# Patient Record
Sex: FEMALE | Race: WHITE
[De-identification: names, ages, dates, MRNs, and addresses within clinical notes are randomized per-mention and may not be internally consistent; named-entity substitution may affect disease eponyms.]

---

## 2019-06-18 ENCOUNTER — HOSPITAL ENCOUNTER (OUTPATIENT)
Dept: HOSPITAL 53 - M INFU | Age: 74
Discharge: HOME | End: 2019-06-18
Attending: PHYSICIAN ASSISTANT
Payer: MEDICARE

## 2019-06-18 VITALS — DIASTOLIC BLOOD PRESSURE: 59 MMHG | SYSTOLIC BLOOD PRESSURE: 123 MMHG

## 2019-06-18 VITALS — HEIGHT: 58 IN | WEIGHT: 105.82 LBS | BODY MASS INDEX: 22.21 KG/M2

## 2019-06-18 VITALS — SYSTOLIC BLOOD PRESSURE: 124 MMHG | DIASTOLIC BLOOD PRESSURE: 59 MMHG

## 2019-06-18 DIAGNOSIS — G35: Primary | ICD-10-CM

## 2019-06-18 PROCEDURE — 96372 THER/PROPH/DIAG INJ SC/IM: CPT

## 2019-06-25 ENCOUNTER — HOSPITAL ENCOUNTER (OUTPATIENT)
Dept: HOSPITAL 53 - M INFU | Age: 74
Discharge: HOME | End: 2019-06-25
Attending: PHYSICIAN ASSISTANT
Payer: MEDICARE

## 2019-06-25 VITALS — HEIGHT: 58 IN | WEIGHT: 105.82 LBS | BODY MASS INDEX: 22.21 KG/M2

## 2019-06-25 VITALS — SYSTOLIC BLOOD PRESSURE: 123 MMHG | DIASTOLIC BLOOD PRESSURE: 58 MMHG

## 2019-06-25 VITALS — DIASTOLIC BLOOD PRESSURE: 74 MMHG | SYSTOLIC BLOOD PRESSURE: 138 MMHG

## 2019-06-25 DIAGNOSIS — G35: Primary | ICD-10-CM

## 2019-06-25 PROCEDURE — 96372 THER/PROPH/DIAG INJ SC/IM: CPT

## 2019-07-02 ENCOUNTER — HOSPITAL ENCOUNTER (OUTPATIENT)
Dept: HOSPITAL 53 - M INFU | Age: 74
Discharge: HOME | End: 2019-07-02
Attending: PHYSICIAN ASSISTANT
Payer: MEDICARE

## 2019-07-02 VITALS — HEIGHT: 58 IN | BODY MASS INDEX: 22.21 KG/M2 | WEIGHT: 105.82 LBS

## 2019-07-02 VITALS — SYSTOLIC BLOOD PRESSURE: 133 MMHG | DIASTOLIC BLOOD PRESSURE: 60 MMHG

## 2019-07-02 VITALS — DIASTOLIC BLOOD PRESSURE: 59 MMHG | SYSTOLIC BLOOD PRESSURE: 132 MMHG

## 2019-07-02 DIAGNOSIS — G35: Primary | ICD-10-CM

## 2019-07-02 PROCEDURE — 96372 THER/PROPH/DIAG INJ SC/IM: CPT

## 2019-07-09 ENCOUNTER — HOSPITAL ENCOUNTER (OUTPATIENT)
Dept: HOSPITAL 53 - M INFU | Age: 74
Discharge: HOME | End: 2019-07-09
Attending: PHYSICIAN ASSISTANT
Payer: MEDICARE

## 2019-07-09 VITALS — BODY MASS INDEX: 16.04 KG/M2 | HEIGHT: 68 IN | WEIGHT: 105.82 LBS

## 2019-07-09 VITALS — DIASTOLIC BLOOD PRESSURE: 66 MMHG | SYSTOLIC BLOOD PRESSURE: 143 MMHG

## 2019-07-09 VITALS — DIASTOLIC BLOOD PRESSURE: 67 MMHG | SYSTOLIC BLOOD PRESSURE: 144 MMHG

## 2019-07-09 DIAGNOSIS — G35: Primary | ICD-10-CM

## 2019-07-09 PROCEDURE — 96372 THER/PROPH/DIAG INJ SC/IM: CPT

## 2019-07-16 ENCOUNTER — HOSPITAL ENCOUNTER (OUTPATIENT)
Dept: HOSPITAL 53 - M INFU | Age: 74
Discharge: HOME | End: 2019-07-16
Attending: PHYSICIAN ASSISTANT
Payer: MEDICARE

## 2019-07-16 VITALS — WEIGHT: 105.82 LBS | BODY MASS INDEX: 16.04 KG/M2 | HEIGHT: 68 IN

## 2019-07-16 VITALS — SYSTOLIC BLOOD PRESSURE: 113 MMHG | DIASTOLIC BLOOD PRESSURE: 55 MMHG

## 2019-07-16 VITALS — DIASTOLIC BLOOD PRESSURE: 59 MMHG | SYSTOLIC BLOOD PRESSURE: 125 MMHG

## 2019-07-16 DIAGNOSIS — G35: Primary | ICD-10-CM

## 2019-07-16 PROCEDURE — 96372 THER/PROPH/DIAG INJ SC/IM: CPT

## 2019-07-23 ENCOUNTER — HOSPITAL ENCOUNTER (OUTPATIENT)
Dept: HOSPITAL 53 - M INFU | Age: 74
Discharge: HOME | End: 2019-07-23
Attending: PHYSICIAN ASSISTANT
Payer: MEDICARE

## 2019-07-23 VITALS — SYSTOLIC BLOOD PRESSURE: 120 MMHG | DIASTOLIC BLOOD PRESSURE: 56 MMHG

## 2019-07-23 VITALS — DIASTOLIC BLOOD PRESSURE: 55 MMHG | SYSTOLIC BLOOD PRESSURE: 115 MMHG

## 2019-07-23 VITALS — HEIGHT: 58 IN | BODY MASS INDEX: 22.21 KG/M2 | WEIGHT: 105.82 LBS

## 2019-07-23 DIAGNOSIS — G35: Primary | ICD-10-CM

## 2019-07-23 PROCEDURE — 96372 THER/PROPH/DIAG INJ SC/IM: CPT

## 2019-07-30 ENCOUNTER — HOSPITAL ENCOUNTER (OUTPATIENT)
Dept: HOSPITAL 53 - M INFU | Age: 74
Discharge: HOME | End: 2019-07-30
Attending: PHYSICIAN ASSISTANT
Payer: MEDICARE

## 2019-07-30 VITALS — SYSTOLIC BLOOD PRESSURE: 129 MMHG | DIASTOLIC BLOOD PRESSURE: 56 MMHG

## 2019-07-30 VITALS — HEIGHT: 58 IN | BODY MASS INDEX: 22.21 KG/M2 | WEIGHT: 105.82 LBS

## 2019-07-30 VITALS — DIASTOLIC BLOOD PRESSURE: 67 MMHG | SYSTOLIC BLOOD PRESSURE: 150 MMHG

## 2019-07-30 DIAGNOSIS — G35: Primary | ICD-10-CM

## 2019-07-30 PROCEDURE — 96401 CHEMO ANTI-NEOPL SQ/IM: CPT

## 2019-08-06 ENCOUNTER — HOSPITAL ENCOUNTER (OUTPATIENT)
Dept: HOSPITAL 53 - M INFU | Age: 74
End: 2019-08-06
Attending: PHYSICIAN ASSISTANT
Payer: MEDICARE

## 2019-08-06 VITALS — SYSTOLIC BLOOD PRESSURE: 147 MMHG | DIASTOLIC BLOOD PRESSURE: 66 MMHG

## 2019-08-06 VITALS — BODY MASS INDEX: 22.21 KG/M2 | WEIGHT: 105.82 LBS | HEIGHT: 58 IN

## 2019-08-06 VITALS — SYSTOLIC BLOOD PRESSURE: 139 MMHG | DIASTOLIC BLOOD PRESSURE: 64 MMHG

## 2019-08-06 DIAGNOSIS — G35: Primary | ICD-10-CM

## 2019-08-06 PROCEDURE — 96372 THER/PROPH/DIAG INJ SC/IM: CPT

## 2019-08-13 ENCOUNTER — HOSPITAL ENCOUNTER (OUTPATIENT)
Dept: HOSPITAL 53 - M INFU | Age: 74
Discharge: HOME | End: 2019-08-13
Attending: PHYSICIAN ASSISTANT
Payer: MEDICARE

## 2019-08-13 VITALS — DIASTOLIC BLOOD PRESSURE: 60 MMHG | SYSTOLIC BLOOD PRESSURE: 128 MMHG

## 2019-08-13 VITALS — SYSTOLIC BLOOD PRESSURE: 136 MMHG | DIASTOLIC BLOOD PRESSURE: 60 MMHG

## 2019-08-13 VITALS — HEIGHT: 58 IN | WEIGHT: 105.82 LBS | BODY MASS INDEX: 22.21 KG/M2

## 2019-08-13 DIAGNOSIS — G35: Primary | ICD-10-CM

## 2019-08-13 PROCEDURE — 96372 THER/PROPH/DIAG INJ SC/IM: CPT

## 2019-08-20 ENCOUNTER — HOSPITAL ENCOUNTER (OUTPATIENT)
Dept: HOSPITAL 53 - M INFU | Age: 74
Discharge: HOME | End: 2019-08-20
Attending: PHYSICIAN ASSISTANT
Payer: MEDICARE

## 2019-08-20 VITALS — SYSTOLIC BLOOD PRESSURE: 113 MMHG | DIASTOLIC BLOOD PRESSURE: 59 MMHG

## 2019-08-20 VITALS — SYSTOLIC BLOOD PRESSURE: 117 MMHG | DIASTOLIC BLOOD PRESSURE: 57 MMHG

## 2019-08-20 VITALS — HEIGHT: 58 IN | BODY MASS INDEX: 22.21 KG/M2 | WEIGHT: 105.82 LBS

## 2019-08-20 DIAGNOSIS — G35: Primary | ICD-10-CM

## 2019-08-20 PROCEDURE — 96372 THER/PROPH/DIAG INJ SC/IM: CPT

## 2019-08-20 RX ADMIN — INTERFERON BETA-1A ONE MCG: 30 INJECTION, POWDER, LYOPHILIZED, FOR SOLUTION INTRAMUSCULAR at 14:31

## 2019-08-27 ENCOUNTER — HOSPITAL ENCOUNTER (OUTPATIENT)
Dept: HOSPITAL 53 - M INFU | Age: 74
Discharge: HOME | End: 2019-08-27
Attending: PHYSICIAN ASSISTANT
Payer: MEDICARE

## 2019-08-27 VITALS — SYSTOLIC BLOOD PRESSURE: 94 MMHG | DIASTOLIC BLOOD PRESSURE: 49 MMHG

## 2019-08-27 VITALS — WEIGHT: 105.82 LBS | BODY MASS INDEX: 22.21 KG/M2 | HEIGHT: 58 IN

## 2019-08-27 VITALS — SYSTOLIC BLOOD PRESSURE: 112 MMHG | DIASTOLIC BLOOD PRESSURE: 56 MMHG

## 2019-08-27 DIAGNOSIS — G35: Primary | ICD-10-CM

## 2019-08-27 PROCEDURE — 96372 THER/PROPH/DIAG INJ SC/IM: CPT

## 2019-09-03 ENCOUNTER — HOSPITAL ENCOUNTER (OUTPATIENT)
Dept: HOSPITAL 53 - M INFU | Age: 74
Discharge: HOME | End: 2019-09-03
Attending: PHYSICIAN ASSISTANT
Payer: MEDICARE

## 2019-09-03 VITALS — SYSTOLIC BLOOD PRESSURE: 137 MMHG | DIASTOLIC BLOOD PRESSURE: 63 MMHG

## 2019-09-03 VITALS — WEIGHT: 105.82 LBS | HEIGHT: 58 IN | BODY MASS INDEX: 22.21 KG/M2

## 2019-09-03 VITALS — SYSTOLIC BLOOD PRESSURE: 150 MMHG | DIASTOLIC BLOOD PRESSURE: 68 MMHG

## 2019-09-03 DIAGNOSIS — G35: Primary | ICD-10-CM

## 2019-09-03 PROCEDURE — 96372 THER/PROPH/DIAG INJ SC/IM: CPT

## 2019-09-10 ENCOUNTER — HOSPITAL ENCOUNTER (OUTPATIENT)
Dept: HOSPITAL 53 - M INFU | Age: 74
Discharge: HOME | End: 2019-09-10
Attending: PHYSICIAN ASSISTANT
Payer: MEDICARE

## 2019-09-10 VITALS — DIASTOLIC BLOOD PRESSURE: 56 MMHG | SYSTOLIC BLOOD PRESSURE: 112 MMHG

## 2019-09-10 VITALS — WEIGHT: 105.82 LBS | BODY MASS INDEX: 23.8 KG/M2 | HEIGHT: 56 IN

## 2019-09-10 VITALS — SYSTOLIC BLOOD PRESSURE: 108 MMHG | DIASTOLIC BLOOD PRESSURE: 51 MMHG

## 2019-09-10 DIAGNOSIS — G35: Primary | ICD-10-CM

## 2019-09-10 PROCEDURE — 96372 THER/PROPH/DIAG INJ SC/IM: CPT

## 2019-09-17 ENCOUNTER — HOSPITAL ENCOUNTER (OUTPATIENT)
Dept: HOSPITAL 53 - M INFU | Age: 74
Discharge: HOME | End: 2019-09-17
Attending: PHYSICIAN ASSISTANT
Payer: MEDICARE

## 2019-09-17 VITALS — HEIGHT: 66 IN | BODY MASS INDEX: 17.01 KG/M2 | WEIGHT: 105.82 LBS

## 2019-09-17 VITALS — SYSTOLIC BLOOD PRESSURE: 117 MMHG | DIASTOLIC BLOOD PRESSURE: 68 MMHG

## 2019-09-17 VITALS — DIASTOLIC BLOOD PRESSURE: 78 MMHG | SYSTOLIC BLOOD PRESSURE: 112 MMHG

## 2019-09-17 DIAGNOSIS — G35: Primary | ICD-10-CM

## 2019-09-17 PROCEDURE — 96372 THER/PROPH/DIAG INJ SC/IM: CPT

## 2019-09-24 ENCOUNTER — HOSPITAL ENCOUNTER (OUTPATIENT)
Dept: HOSPITAL 53 - M INFU | Age: 74
Discharge: HOME | End: 2019-09-24
Attending: PHYSICIAN ASSISTANT
Payer: MEDICARE

## 2019-09-24 VITALS — BODY MASS INDEX: 23.8 KG/M2 | HEIGHT: 56 IN | WEIGHT: 105.82 LBS

## 2019-09-24 VITALS — SYSTOLIC BLOOD PRESSURE: 107 MMHG | DIASTOLIC BLOOD PRESSURE: 50 MMHG

## 2019-09-24 VITALS — SYSTOLIC BLOOD PRESSURE: 123 MMHG | DIASTOLIC BLOOD PRESSURE: 56 MMHG

## 2019-09-24 DIAGNOSIS — G35: Primary | ICD-10-CM

## 2019-09-24 PROCEDURE — 96401 CHEMO ANTI-NEOPL SQ/IM: CPT

## 2019-10-01 ENCOUNTER — HOSPITAL ENCOUNTER (OUTPATIENT)
Dept: HOSPITAL 53 - M INFU | Age: 74
Discharge: HOME | End: 2019-10-01
Attending: PHYSICIAN ASSISTANT
Payer: MEDICARE

## 2019-10-01 VITALS — HEIGHT: 59 IN | BODY MASS INDEX: 21.33 KG/M2 | WEIGHT: 105.82 LBS

## 2019-10-01 VITALS — DIASTOLIC BLOOD PRESSURE: 57 MMHG | SYSTOLIC BLOOD PRESSURE: 113 MMHG

## 2019-10-01 DIAGNOSIS — G35: Primary | ICD-10-CM

## 2019-10-01 PROCEDURE — 96372 THER/PROPH/DIAG INJ SC/IM: CPT

## 2019-10-02 ENCOUNTER — HOSPITAL ENCOUNTER (OUTPATIENT)
Dept: HOSPITAL 53 - M LAB REF | Age: 74
End: 2019-10-02
Attending: INTERNAL MEDICINE
Payer: MEDICARE

## 2019-10-02 DIAGNOSIS — N39.0: Primary | ICD-10-CM

## 2019-10-08 ENCOUNTER — HOSPITAL ENCOUNTER (OUTPATIENT)
Dept: HOSPITAL 53 - M INFU | Age: 74
Discharge: HOME | End: 2019-10-08
Attending: PHYSICIAN ASSISTANT
Payer: MEDICARE

## 2019-10-08 VITALS — DIASTOLIC BLOOD PRESSURE: 56 MMHG | SYSTOLIC BLOOD PRESSURE: 119 MMHG

## 2019-10-08 VITALS — HEIGHT: 59 IN | WEIGHT: 105.82 LBS | BODY MASS INDEX: 21.33 KG/M2

## 2019-10-08 DIAGNOSIS — G35: Primary | ICD-10-CM

## 2019-10-08 PROCEDURE — 96401 CHEMO ANTI-NEOPL SQ/IM: CPT

## 2019-10-15 ENCOUNTER — HOSPITAL ENCOUNTER (OUTPATIENT)
Dept: HOSPITAL 53 - M INFU | Age: 74
Discharge: HOME | End: 2019-10-15
Attending: PHYSICIAN ASSISTANT
Payer: MEDICARE

## 2019-10-15 VITALS — BODY MASS INDEX: 21.33 KG/M2 | WEIGHT: 105.82 LBS | HEIGHT: 59 IN

## 2019-10-15 VITALS — SYSTOLIC BLOOD PRESSURE: 119 MMHG | DIASTOLIC BLOOD PRESSURE: 56 MMHG

## 2019-10-15 DIAGNOSIS — G35: Primary | ICD-10-CM

## 2019-10-15 PROCEDURE — 96372 THER/PROPH/DIAG INJ SC/IM: CPT

## 2019-10-22 ENCOUNTER — HOSPITAL ENCOUNTER (OUTPATIENT)
Dept: HOSPITAL 53 - M INFU | Age: 74
Discharge: HOME | End: 2019-10-22
Attending: PHYSICIAN ASSISTANT
Payer: MEDICARE

## 2019-10-22 VITALS — SYSTOLIC BLOOD PRESSURE: 144 MMHG | DIASTOLIC BLOOD PRESSURE: 66 MMHG

## 2019-10-22 VITALS — SYSTOLIC BLOOD PRESSURE: 124 MMHG | DIASTOLIC BLOOD PRESSURE: 59 MMHG

## 2019-10-22 VITALS — HEIGHT: 59 IN | WEIGHT: 105.82 LBS | BODY MASS INDEX: 21.33 KG/M2

## 2019-10-22 DIAGNOSIS — G35: Primary | ICD-10-CM

## 2019-10-22 PROCEDURE — 96372 THER/PROPH/DIAG INJ SC/IM: CPT

## 2019-10-29 ENCOUNTER — HOSPITAL ENCOUNTER (OUTPATIENT)
Dept: HOSPITAL 53 - M INFU | Age: 74
Discharge: HOME | End: 2019-10-29
Attending: PHYSICIAN ASSISTANT
Payer: MEDICARE

## 2019-10-29 VITALS — WEIGHT: 105.82 LBS | HEIGHT: 59 IN | BODY MASS INDEX: 21.33 KG/M2

## 2019-10-29 VITALS — DIASTOLIC BLOOD PRESSURE: 59 MMHG | SYSTOLIC BLOOD PRESSURE: 125 MMHG

## 2019-10-29 DIAGNOSIS — G35: Primary | ICD-10-CM

## 2019-10-29 PROCEDURE — 96372 THER/PROPH/DIAG INJ SC/IM: CPT

## 2019-11-05 ENCOUNTER — HOSPITAL ENCOUNTER (OUTPATIENT)
Dept: HOSPITAL 53 - M INFU | Age: 74
Discharge: HOME | End: 2019-11-05
Attending: PHYSICIAN ASSISTANT
Payer: MEDICARE

## 2019-11-05 VITALS — SYSTOLIC BLOOD PRESSURE: 131 MMHG | DIASTOLIC BLOOD PRESSURE: 61 MMHG

## 2019-11-05 VITALS — BODY MASS INDEX: 19.98 KG/M2 | WEIGHT: 105.82 LBS | HEIGHT: 61 IN

## 2019-11-05 DIAGNOSIS — G35: Primary | ICD-10-CM

## 2019-11-05 PROCEDURE — 96401 CHEMO ANTI-NEOPL SQ/IM: CPT

## 2019-11-19 ENCOUNTER — HOSPITAL ENCOUNTER (OUTPATIENT)
Dept: HOSPITAL 53 - M INFU | Age: 74
Discharge: HOME | End: 2019-11-19
Attending: PHYSICIAN ASSISTANT
Payer: MEDICARE

## 2019-11-19 VITALS — HEIGHT: 61 IN | WEIGHT: 105.82 LBS | BODY MASS INDEX: 19.98 KG/M2

## 2019-11-19 VITALS — DIASTOLIC BLOOD PRESSURE: 65 MMHG | SYSTOLIC BLOOD PRESSURE: 135 MMHG

## 2019-11-19 DIAGNOSIS — G35: Primary | ICD-10-CM

## 2019-11-19 PROCEDURE — 96401 CHEMO ANTI-NEOPL SQ/IM: CPT

## 2019-11-26 ENCOUNTER — HOSPITAL ENCOUNTER (OUTPATIENT)
Dept: HOSPITAL 53 - M INFU | Age: 74
Discharge: HOME | End: 2019-11-26
Attending: PHYSICIAN ASSISTANT
Payer: MEDICARE

## 2019-11-26 VITALS — WEIGHT: 105.82 LBS | BODY MASS INDEX: 19.98 KG/M2 | HEIGHT: 61 IN

## 2019-11-26 VITALS — SYSTOLIC BLOOD PRESSURE: 136 MMHG | DIASTOLIC BLOOD PRESSURE: 63 MMHG

## 2019-11-26 DIAGNOSIS — G35: Primary | ICD-10-CM

## 2019-11-26 PROCEDURE — 96372 THER/PROPH/DIAG INJ SC/IM: CPT

## 2019-12-03 ENCOUNTER — HOSPITAL ENCOUNTER (OUTPATIENT)
Dept: HOSPITAL 53 - M INFU | Age: 74
Discharge: HOME | End: 2019-12-03
Attending: PHYSICIAN ASSISTANT
Payer: MEDICARE

## 2019-12-03 VITALS — BODY MASS INDEX: 21.33 KG/M2 | HEIGHT: 59 IN | WEIGHT: 105.82 LBS

## 2019-12-03 VITALS — SYSTOLIC BLOOD PRESSURE: 120 MMHG | DIASTOLIC BLOOD PRESSURE: 55 MMHG

## 2019-12-03 DIAGNOSIS — G35: Primary | ICD-10-CM

## 2019-12-03 PROCEDURE — 96372 THER/PROPH/DIAG INJ SC/IM: CPT

## 2019-12-10 ENCOUNTER — HOSPITAL ENCOUNTER (OUTPATIENT)
Dept: HOSPITAL 53 - M INFU | Age: 74
Discharge: HOME | End: 2019-12-10
Attending: PHYSICIAN ASSISTANT
Payer: MEDICARE

## 2019-12-10 VITALS — WEIGHT: 105.82 LBS | HEIGHT: 59 IN | BODY MASS INDEX: 21.33 KG/M2

## 2019-12-10 VITALS — SYSTOLIC BLOOD PRESSURE: 133 MMHG | DIASTOLIC BLOOD PRESSURE: 61 MMHG

## 2019-12-10 DIAGNOSIS — G35: Primary | ICD-10-CM

## 2019-12-10 PROCEDURE — 96372 THER/PROPH/DIAG INJ SC/IM: CPT

## 2019-12-17 ENCOUNTER — HOSPITAL ENCOUNTER (OUTPATIENT)
Dept: HOSPITAL 53 - M INFU | Age: 74
Discharge: HOME | End: 2019-12-17
Attending: PHYSICIAN ASSISTANT
Payer: MEDICARE

## 2019-12-17 VITALS — HEIGHT: 59 IN | BODY MASS INDEX: 21.33 KG/M2 | WEIGHT: 105.82 LBS

## 2019-12-17 VITALS — SYSTOLIC BLOOD PRESSURE: 143 MMHG | DIASTOLIC BLOOD PRESSURE: 66 MMHG

## 2019-12-17 DIAGNOSIS — G35: Primary | ICD-10-CM

## 2019-12-17 PROCEDURE — 96372 THER/PROPH/DIAG INJ SC/IM: CPT

## 2019-12-26 ENCOUNTER — HOSPITAL ENCOUNTER (OUTPATIENT)
Dept: HOSPITAL 53 - M INFU | Age: 74
End: 2019-12-26
Attending: PHYSICIAN ASSISTANT
Payer: MEDICARE

## 2019-12-26 VITALS — HEIGHT: 69 IN | WEIGHT: 105.82 LBS | BODY MASS INDEX: 15.67 KG/M2

## 2019-12-26 VITALS — DIASTOLIC BLOOD PRESSURE: 64 MMHG | SYSTOLIC BLOOD PRESSURE: 142 MMHG

## 2019-12-26 DIAGNOSIS — G35: Primary | ICD-10-CM

## 2019-12-26 PROCEDURE — 96372 THER/PROPH/DIAG INJ SC/IM: CPT

## 2019-12-31 ENCOUNTER — HOSPITAL ENCOUNTER (OUTPATIENT)
Dept: HOSPITAL 53 - M INFU | Age: 74
Discharge: HOME | End: 2019-12-31
Attending: PHYSICIAN ASSISTANT
Payer: MEDICARE

## 2019-12-31 VITALS — WEIGHT: 105.82 LBS | HEIGHT: 58 IN | BODY MASS INDEX: 22.21 KG/M2

## 2019-12-31 VITALS — SYSTOLIC BLOOD PRESSURE: 129 MMHG | DIASTOLIC BLOOD PRESSURE: 62 MMHG

## 2019-12-31 DIAGNOSIS — G35: Primary | ICD-10-CM

## 2019-12-31 PROCEDURE — 96372 THER/PROPH/DIAG INJ SC/IM: CPT

## 2020-01-07 ENCOUNTER — HOSPITAL ENCOUNTER (OUTPATIENT)
Dept: HOSPITAL 53 - M INFU | Age: 75
Discharge: HOME | End: 2020-01-07
Attending: PHYSICIAN ASSISTANT
Payer: MEDICARE

## 2020-01-07 VITALS — HEIGHT: 59 IN | WEIGHT: 105.82 LBS | BODY MASS INDEX: 21.33 KG/M2

## 2020-01-07 VITALS — SYSTOLIC BLOOD PRESSURE: 121 MMHG | DIASTOLIC BLOOD PRESSURE: 67 MMHG

## 2020-01-07 DIAGNOSIS — G35: Primary | ICD-10-CM

## 2020-01-07 PROCEDURE — 96372 THER/PROPH/DIAG INJ SC/IM: CPT

## 2020-01-21 ENCOUNTER — HOSPITAL ENCOUNTER (OUTPATIENT)
Dept: HOSPITAL 53 - M INFU | Age: 75
Discharge: HOME | End: 2020-01-21
Attending: PHYSICIAN ASSISTANT
Payer: MEDICARE

## 2020-01-21 VITALS — WEIGHT: 105.16 LBS | BODY MASS INDEX: 21.49 KG/M2 | HEIGHT: 58.5 IN

## 2020-01-21 VITALS — DIASTOLIC BLOOD PRESSURE: 62 MMHG | SYSTOLIC BLOOD PRESSURE: 137 MMHG

## 2020-01-21 DIAGNOSIS — G35: Primary | ICD-10-CM

## 2020-01-21 PROCEDURE — 96372 THER/PROPH/DIAG INJ SC/IM: CPT

## 2020-01-28 ENCOUNTER — HOSPITAL ENCOUNTER (OUTPATIENT)
Dept: HOSPITAL 53 - M INFU | Age: 75
Discharge: HOME | End: 2020-01-28
Attending: PHYSICIAN ASSISTANT
Payer: MEDICARE

## 2020-01-28 VITALS — WEIGHT: 105.82 LBS | BODY MASS INDEX: 22.21 KG/M2 | HEIGHT: 58 IN

## 2020-01-28 VITALS — DIASTOLIC BLOOD PRESSURE: 60 MMHG | SYSTOLIC BLOOD PRESSURE: 119 MMHG

## 2020-01-28 DIAGNOSIS — G35: Primary | ICD-10-CM

## 2020-01-28 PROCEDURE — 96372 THER/PROPH/DIAG INJ SC/IM: CPT

## 2020-02-04 ENCOUNTER — HOSPITAL ENCOUNTER (OUTPATIENT)
Dept: HOSPITAL 53 - M INFU | Age: 75
Discharge: HOME | End: 2020-02-04
Attending: PHYSICIAN ASSISTANT
Payer: MEDICARE

## 2020-02-04 VITALS — HEIGHT: 58 IN | BODY MASS INDEX: 22.21 KG/M2 | WEIGHT: 105.82 LBS

## 2020-02-04 VITALS — DIASTOLIC BLOOD PRESSURE: 66 MMHG | SYSTOLIC BLOOD PRESSURE: 147 MMHG

## 2020-02-04 DIAGNOSIS — G35: Primary | ICD-10-CM

## 2020-02-04 PROCEDURE — 96372 THER/PROPH/DIAG INJ SC/IM: CPT

## 2020-02-11 ENCOUNTER — HOSPITAL ENCOUNTER (OUTPATIENT)
Dept: HOSPITAL 53 - M LABNEURO | Age: 75
End: 2020-02-11
Attending: PHYSICIAN ASSISTANT
Payer: MEDICARE

## 2020-02-11 ENCOUNTER — HOSPITAL ENCOUNTER (OUTPATIENT)
Dept: HOSPITAL 53 - M INFU | Age: 75
Discharge: HOME | End: 2020-02-11
Attending: PHYSICIAN ASSISTANT
Payer: MEDICARE

## 2020-02-11 VITALS — WEIGHT: 105.82 LBS | BODY MASS INDEX: 21.33 KG/M2 | HEIGHT: 59 IN

## 2020-02-11 VITALS — DIASTOLIC BLOOD PRESSURE: 65 MMHG | SYSTOLIC BLOOD PRESSURE: 161 MMHG

## 2020-02-11 DIAGNOSIS — G35: Primary | ICD-10-CM

## 2020-02-11 LAB
ALBUMIN SERPL BCG-MCNC: 3.6 GM/DL (ref 3.2–5.2)
ALT SERPL W P-5'-P-CCNC: 43 U/L (ref 12–78)
BASOPHILS # BLD AUTO: 0 10^3/UL (ref 0–0.2)
BASOPHILS NFR BLD AUTO: 0.3 % (ref 0–1)
BILIRUB SERPL-MCNC: 0.5 MG/DL (ref 0.2–1)
BUN SERPL-MCNC: 33 MG/DL (ref 7–18)
CALCIUM SERPL-MCNC: 8.9 MG/DL (ref 8.8–10.2)
CHLORIDE SERPL-SCNC: 109 MEQ/L (ref 98–107)
CO2 SERPL-SCNC: 29 MEQ/L (ref 21–32)
CREAT SERPL-MCNC: 1.52 MG/DL (ref 0.55–1.3)
EOSINOPHIL # BLD AUTO: 0.4 10^3/UL (ref 0–0.5)
EOSINOPHIL NFR BLD AUTO: 5.6 % (ref 0–3)
GFR SERPL CREATININE-BSD FRML MDRD: 35.7 ML/MIN/{1.73_M2} (ref 39–?)
GLUCOSE SERPL-MCNC: 100 MG/DL (ref 70–100)
HCT VFR BLD AUTO: 41.7 % (ref 36–47)
HGB BLD-MCNC: 13.1 G/DL (ref 12–15.5)
LYMPHOCYTES # BLD AUTO: 1.9 10^3/UL (ref 1.5–5)
LYMPHOCYTES NFR BLD AUTO: 29 % (ref 24–44)
MCH RBC QN AUTO: 31.3 PG (ref 27–33)
MCHC RBC AUTO-ENTMCNC: 31.4 G/DL (ref 32–36.5)
MCV RBC AUTO: 99.5 FL (ref 80–96)
MONOCYTES # BLD AUTO: 0.6 10^3/UL (ref 0–0.8)
MONOCYTES NFR BLD AUTO: 8.9 % (ref 0–5)
NEUTROPHILS # BLD AUTO: 3.6 10^3/UL (ref 1.5–8.5)
NEUTROPHILS NFR BLD AUTO: 55.7 % (ref 36–66)
PLATELET # BLD AUTO: 221 10^3/UL (ref 150–450)
POTASSIUM SERPL-SCNC: 4.3 MEQ/L (ref 3.5–5.1)
PROT SERPL-MCNC: 6.9 GM/DL (ref 6.4–8.2)
RBC # BLD AUTO: 4.19 10^6/UL (ref 4–5.4)
SODIUM SERPL-SCNC: 143 MEQ/L (ref 136–145)
WBC # BLD AUTO: 6.4 10^3/UL (ref 4–10)

## 2020-02-11 PROCEDURE — 80053 COMPREHEN METABOLIC PANEL: CPT

## 2020-02-11 PROCEDURE — 85025 COMPLETE CBC W/AUTO DIFF WBC: CPT

## 2020-02-11 PROCEDURE — 36415 COLL VENOUS BLD VENIPUNCTURE: CPT

## 2020-02-11 PROCEDURE — 96372 THER/PROPH/DIAG INJ SC/IM: CPT

## 2020-02-18 ENCOUNTER — HOSPITAL ENCOUNTER (OUTPATIENT)
Dept: HOSPITAL 53 - M INFU | Age: 75
Discharge: HOME | End: 2020-02-18
Attending: PHYSICIAN ASSISTANT
Payer: MEDICARE

## 2020-02-18 VITALS — WEIGHT: 105.82 LBS | HEIGHT: 59 IN | BODY MASS INDEX: 21.33 KG/M2

## 2020-02-18 VITALS — DIASTOLIC BLOOD PRESSURE: 62 MMHG | SYSTOLIC BLOOD PRESSURE: 108 MMHG

## 2020-02-18 DIAGNOSIS — G35: Primary | ICD-10-CM

## 2020-02-18 PROCEDURE — 96372 THER/PROPH/DIAG INJ SC/IM: CPT

## 2020-02-25 ENCOUNTER — HOSPITAL ENCOUNTER (OUTPATIENT)
Dept: HOSPITAL 53 - M INFU | Age: 75
Discharge: HOME | End: 2020-02-25
Attending: PHYSICIAN ASSISTANT
Payer: MEDICARE

## 2020-02-25 VITALS — SYSTOLIC BLOOD PRESSURE: 149 MMHG | DIASTOLIC BLOOD PRESSURE: 67 MMHG

## 2020-02-25 VITALS — WEIGHT: 105.82 LBS | BODY MASS INDEX: 21.33 KG/M2 | HEIGHT: 59 IN

## 2020-02-25 DIAGNOSIS — G35: Primary | ICD-10-CM

## 2020-02-25 PROCEDURE — 96372 THER/PROPH/DIAG INJ SC/IM: CPT

## 2020-03-03 ENCOUNTER — HOSPITAL ENCOUNTER (OUTPATIENT)
Dept: HOSPITAL 53 - M INFU | Age: 75
Discharge: HOME | End: 2020-03-03
Attending: PHYSICIAN ASSISTANT
Payer: MEDICARE

## 2020-03-03 VITALS — DIASTOLIC BLOOD PRESSURE: 53 MMHG | SYSTOLIC BLOOD PRESSURE: 122 MMHG

## 2020-03-03 VITALS — BODY MASS INDEX: 21.33 KG/M2 | HEIGHT: 59 IN | WEIGHT: 105.82 LBS

## 2020-03-03 DIAGNOSIS — G35: Primary | ICD-10-CM

## 2020-03-03 PROCEDURE — 96372 THER/PROPH/DIAG INJ SC/IM: CPT

## 2020-03-10 ENCOUNTER — HOSPITAL ENCOUNTER (OUTPATIENT)
Dept: HOSPITAL 53 - M INFU | Age: 75
Discharge: HOME | End: 2020-03-10
Attending: PHYSICIAN ASSISTANT
Payer: MEDICARE

## 2020-03-10 VITALS — WEIGHT: 105.82 LBS | HEIGHT: 59 IN | BODY MASS INDEX: 21.33 KG/M2

## 2020-03-10 VITALS — DIASTOLIC BLOOD PRESSURE: 61 MMHG | SYSTOLIC BLOOD PRESSURE: 137 MMHG

## 2020-03-10 DIAGNOSIS — G35: Primary | ICD-10-CM

## 2020-03-10 PROCEDURE — 96372 THER/PROPH/DIAG INJ SC/IM: CPT

## 2020-03-17 ENCOUNTER — HOSPITAL ENCOUNTER (OUTPATIENT)
Dept: HOSPITAL 53 - M INFU | Age: 75
Discharge: HOME | End: 2020-03-17
Attending: PHYSICIAN ASSISTANT
Payer: MEDICARE

## 2020-03-17 VITALS — HEIGHT: 59 IN | WEIGHT: 105.82 LBS | BODY MASS INDEX: 21.33 KG/M2

## 2020-03-17 VITALS — DIASTOLIC BLOOD PRESSURE: 60 MMHG | SYSTOLIC BLOOD PRESSURE: 131 MMHG

## 2020-03-17 DIAGNOSIS — G35: Primary | ICD-10-CM

## 2020-03-17 PROCEDURE — 96372 THER/PROPH/DIAG INJ SC/IM: CPT

## 2020-03-24 ENCOUNTER — HOSPITAL ENCOUNTER (OUTPATIENT)
Dept: HOSPITAL 53 - M INFU | Age: 75
LOS: 1 days | Discharge: HOME | End: 2020-03-25
Attending: PHYSICIAN ASSISTANT
Payer: MEDICARE

## 2020-03-24 VITALS — DIASTOLIC BLOOD PRESSURE: 63 MMHG | SYSTOLIC BLOOD PRESSURE: 145 MMHG

## 2020-03-24 VITALS — BODY MASS INDEX: 21.33 KG/M2 | HEIGHT: 59 IN | WEIGHT: 105.82 LBS

## 2020-03-24 DIAGNOSIS — G35: Primary | ICD-10-CM

## 2020-03-24 PROCEDURE — 96372 THER/PROPH/DIAG INJ SC/IM: CPT

## 2020-03-31 ENCOUNTER — HOSPITAL ENCOUNTER (OUTPATIENT)
Dept: HOSPITAL 53 - M INFU | Age: 75
Discharge: HOME | End: 2020-03-31
Attending: PHYSICIAN ASSISTANT
Payer: MEDICARE

## 2020-03-31 VITALS — SYSTOLIC BLOOD PRESSURE: 116 MMHG | DIASTOLIC BLOOD PRESSURE: 90 MMHG

## 2020-03-31 VITALS — BODY MASS INDEX: 21.33 KG/M2 | WEIGHT: 105.82 LBS | HEIGHT: 59 IN

## 2020-03-31 DIAGNOSIS — G35: Primary | ICD-10-CM

## 2020-03-31 PROCEDURE — 96372 THER/PROPH/DIAG INJ SC/IM: CPT

## 2020-04-07 ENCOUNTER — HOSPITAL ENCOUNTER (OUTPATIENT)
Dept: HOSPITAL 53 - M INFU | Age: 75
Discharge: HOME | End: 2020-04-07
Attending: PHYSICIAN ASSISTANT
Payer: MEDICARE

## 2020-04-07 VITALS — WEIGHT: 105.82 LBS | BODY MASS INDEX: 21.33 KG/M2 | HEIGHT: 59 IN

## 2020-04-07 VITALS — DIASTOLIC BLOOD PRESSURE: 65 MMHG | SYSTOLIC BLOOD PRESSURE: 139 MMHG

## 2020-04-07 DIAGNOSIS — G35: Primary | ICD-10-CM

## 2020-04-07 PROCEDURE — 96401 CHEMO ANTI-NEOPL SQ/IM: CPT

## 2020-04-14 ENCOUNTER — HOSPITAL ENCOUNTER (OUTPATIENT)
Dept: HOSPITAL 53 - M INFU | Age: 75
Discharge: HOME | End: 2020-04-14
Attending: PHYSICIAN ASSISTANT
Payer: MEDICARE

## 2020-04-14 VITALS — SYSTOLIC BLOOD PRESSURE: 125 MMHG | DIASTOLIC BLOOD PRESSURE: 58 MMHG

## 2020-04-14 VITALS — HEIGHT: 59 IN | WEIGHT: 105.82 LBS | BODY MASS INDEX: 21.33 KG/M2

## 2020-04-14 DIAGNOSIS — G35: Primary | ICD-10-CM

## 2020-04-14 PROCEDURE — 96372 THER/PROPH/DIAG INJ SC/IM: CPT

## 2020-04-21 ENCOUNTER — HOSPITAL ENCOUNTER (OUTPATIENT)
Dept: HOSPITAL 53 - M INFU | Age: 75
End: 2020-04-21
Attending: PHYSICIAN ASSISTANT
Payer: MEDICARE

## 2020-04-21 VITALS — BODY MASS INDEX: 21.33 KG/M2 | WEIGHT: 105.82 LBS | HEIGHT: 59 IN

## 2020-04-21 VITALS — DIASTOLIC BLOOD PRESSURE: 65 MMHG | SYSTOLIC BLOOD PRESSURE: 138 MMHG

## 2020-04-21 DIAGNOSIS — G35: Primary | ICD-10-CM

## 2020-04-21 PROCEDURE — 96372 THER/PROPH/DIAG INJ SC/IM: CPT

## 2020-04-28 ENCOUNTER — HOSPITAL ENCOUNTER (OUTPATIENT)
Dept: HOSPITAL 53 - M INFU | Age: 75
Discharge: HOME | End: 2020-04-28
Attending: PHYSICIAN ASSISTANT
Payer: MEDICARE

## 2020-04-28 VITALS — SYSTOLIC BLOOD PRESSURE: 145 MMHG | DIASTOLIC BLOOD PRESSURE: 55 MMHG

## 2020-04-28 VITALS — HEIGHT: 59 IN | WEIGHT: 105.82 LBS | BODY MASS INDEX: 21.33 KG/M2

## 2020-04-28 DIAGNOSIS — G35: Primary | ICD-10-CM

## 2020-04-28 PROCEDURE — 96372 THER/PROPH/DIAG INJ SC/IM: CPT

## 2020-05-05 ENCOUNTER — HOSPITAL ENCOUNTER (OUTPATIENT)
Dept: HOSPITAL 53 - M INFU | Age: 75
Discharge: HOME | End: 2020-05-05
Attending: PHYSICIAN ASSISTANT
Payer: MEDICARE

## 2020-05-05 VITALS — WEIGHT: 105.82 LBS | HEIGHT: 59 IN | BODY MASS INDEX: 21.33 KG/M2

## 2020-05-05 VITALS — DIASTOLIC BLOOD PRESSURE: 61 MMHG | SYSTOLIC BLOOD PRESSURE: 125 MMHG

## 2020-05-05 DIAGNOSIS — G35: Primary | ICD-10-CM

## 2020-05-05 PROCEDURE — 96372 THER/PROPH/DIAG INJ SC/IM: CPT

## 2020-05-12 ENCOUNTER — HOSPITAL ENCOUNTER (OUTPATIENT)
Dept: HOSPITAL 53 - M INFU | Age: 75
Discharge: HOME | End: 2020-05-12
Attending: PHYSICIAN ASSISTANT
Payer: MEDICARE

## 2020-05-12 VITALS — SYSTOLIC BLOOD PRESSURE: 140 MMHG | DIASTOLIC BLOOD PRESSURE: 65 MMHG

## 2020-05-12 VITALS — BODY MASS INDEX: 21.33 KG/M2 | WEIGHT: 105.82 LBS | HEIGHT: 59 IN

## 2020-05-12 DIAGNOSIS — G35: Primary | ICD-10-CM

## 2020-05-12 PROCEDURE — 96372 THER/PROPH/DIAG INJ SC/IM: CPT

## 2020-05-19 ENCOUNTER — HOSPITAL ENCOUNTER (OUTPATIENT)
Dept: HOSPITAL 53 - M INFU | Age: 75
Discharge: HOME | End: 2020-05-19
Attending: PHYSICIAN ASSISTANT
Payer: MEDICARE

## 2020-05-19 VITALS — WEIGHT: 105.82 LBS | HEIGHT: 59 IN | BODY MASS INDEX: 21.33 KG/M2

## 2020-05-19 VITALS — DIASTOLIC BLOOD PRESSURE: 58 MMHG | SYSTOLIC BLOOD PRESSURE: 122 MMHG

## 2020-05-19 DIAGNOSIS — G35: Primary | ICD-10-CM

## 2020-05-19 PROCEDURE — 96372 THER/PROPH/DIAG INJ SC/IM: CPT

## 2020-05-26 ENCOUNTER — HOSPITAL ENCOUNTER (OUTPATIENT)
Dept: HOSPITAL 53 - M INFU | Age: 75
Discharge: HOME | End: 2020-05-26
Attending: PHYSICIAN ASSISTANT
Payer: MEDICARE

## 2020-05-26 VITALS — BODY MASS INDEX: 21.33 KG/M2 | WEIGHT: 105.82 LBS | HEIGHT: 59 IN

## 2020-05-26 VITALS — DIASTOLIC BLOOD PRESSURE: 60 MMHG | SYSTOLIC BLOOD PRESSURE: 133 MMHG

## 2020-05-26 DIAGNOSIS — G35: Primary | ICD-10-CM

## 2020-05-26 PROCEDURE — 96372 THER/PROPH/DIAG INJ SC/IM: CPT

## 2020-06-02 ENCOUNTER — HOSPITAL ENCOUNTER (OUTPATIENT)
Dept: HOSPITAL 53 - M INFU | Age: 75
Discharge: HOME | End: 2020-06-02
Attending: PHYSICIAN ASSISTANT
Payer: MEDICARE

## 2020-06-02 VITALS — BODY MASS INDEX: 21.33 KG/M2 | HEIGHT: 59 IN | WEIGHT: 105.82 LBS

## 2020-06-02 VITALS — DIASTOLIC BLOOD PRESSURE: 65 MMHG | SYSTOLIC BLOOD PRESSURE: 144 MMHG

## 2020-06-02 DIAGNOSIS — G35: Primary | ICD-10-CM

## 2020-06-02 PROCEDURE — 96372 THER/PROPH/DIAG INJ SC/IM: CPT

## 2020-06-09 ENCOUNTER — HOSPITAL ENCOUNTER (OUTPATIENT)
Dept: HOSPITAL 53 - M INFU | Age: 75
Discharge: HOME | End: 2020-06-09
Attending: PHYSICIAN ASSISTANT
Payer: MEDICARE

## 2020-06-09 VITALS — HEIGHT: 59 IN | WEIGHT: 105.82 LBS | BODY MASS INDEX: 21.33 KG/M2

## 2020-06-09 VITALS — DIASTOLIC BLOOD PRESSURE: 60 MMHG | SYSTOLIC BLOOD PRESSURE: 132 MMHG

## 2020-06-09 DIAGNOSIS — G35: Primary | ICD-10-CM

## 2020-06-09 PROCEDURE — 96372 THER/PROPH/DIAG INJ SC/IM: CPT

## 2020-06-16 ENCOUNTER — HOSPITAL ENCOUNTER (OUTPATIENT)
Dept: HOSPITAL 53 - M LAB | Age: 75
End: 2020-06-16
Attending: PODIATRIST
Payer: MEDICARE

## 2020-06-16 ENCOUNTER — HOSPITAL ENCOUNTER (OUTPATIENT)
Dept: HOSPITAL 53 - M INFU | Age: 75
Discharge: HOME | End: 2020-06-16
Attending: PHYSICIAN ASSISTANT
Payer: MEDICARE

## 2020-06-16 VITALS — HEIGHT: 59 IN | WEIGHT: 105.82 LBS | BODY MASS INDEX: 21.33 KG/M2

## 2020-06-16 VITALS — DIASTOLIC BLOOD PRESSURE: 73 MMHG | SYSTOLIC BLOOD PRESSURE: 172 MMHG

## 2020-06-16 DIAGNOSIS — Z01.818: Primary | ICD-10-CM

## 2020-06-16 DIAGNOSIS — G35: Primary | ICD-10-CM

## 2020-06-16 DIAGNOSIS — R00.1: ICD-10-CM

## 2020-06-16 DIAGNOSIS — D49.2: ICD-10-CM

## 2020-06-16 LAB
BASOPHILS # BLD AUTO: 0 10^3/UL (ref 0–0.2)
BASOPHILS NFR BLD AUTO: 0.3 % (ref 0–1)
BUN SERPL-MCNC: 29 MG/DL (ref 7–18)
CALCIUM SERPL-MCNC: 9.1 MG/DL (ref 8.8–10.2)
CHLORIDE SERPL-SCNC: 109 MEQ/L (ref 98–107)
CO2 SERPL-SCNC: 27 MEQ/L (ref 21–32)
CREAT SERPL-MCNC: 1.38 MG/DL (ref 0.55–1.3)
EOSINOPHIL # BLD AUTO: 0.3 10^3/UL (ref 0–0.5)
EOSINOPHIL NFR BLD AUTO: 4.3 % (ref 0–3)
GFR SERPL CREATININE-BSD FRML MDRD: 39.9 ML/MIN/{1.73_M2} (ref 39–?)
GLUCOSE SERPL-MCNC: 111 MG/DL (ref 70–100)
HCT VFR BLD AUTO: 38.6 % (ref 36–47)
HGB BLD-MCNC: 12.7 G/DL (ref 12–15.5)
LYMPHOCYTES # BLD AUTO: 2 10^3/UL (ref 1.5–5)
LYMPHOCYTES NFR BLD AUTO: 28.1 % (ref 24–44)
MCH RBC QN AUTO: 32.1 PG (ref 27–33)
MCHC RBC AUTO-ENTMCNC: 32.9 G/DL (ref 32–36.5)
MCV RBC AUTO: 97.5 FL (ref 80–96)
MONOCYTES # BLD AUTO: 0.5 10^3/UL (ref 0–0.8)
MONOCYTES NFR BLD AUTO: 7.4 % (ref 0–5)
NEUTROPHILS # BLD AUTO: 4.2 10^3/UL (ref 1.5–8.5)
NEUTROPHILS NFR BLD AUTO: 59.6 % (ref 36–66)
PLATELET # BLD AUTO: 217 10^3/UL (ref 150–450)
POTASSIUM SERPL-SCNC: 4 MEQ/L (ref 3.5–5.1)
RBC # BLD AUTO: 3.96 10^6/UL (ref 4–5.4)
SODIUM SERPL-SCNC: 143 MEQ/L (ref 136–145)
WBC # BLD AUTO: 7 10^3/UL (ref 4–10)

## 2020-06-16 PROCEDURE — 96372 THER/PROPH/DIAG INJ SC/IM: CPT

## 2020-06-16 NOTE — ECGEPIP
[facility]

                                       

                                       Test Date:    2020

Pat Name:     PRASANTH SYED            Department:   

Patient ID:   [id number]                 Room:         -

Gender:       Female                   Technician:   RF

:          1945               Requested By: Feliciano Cuadra 

Order Number: [id number]     Reading MD:   Pan Miller

                                 Measurements

Intervals                              Axis          

Rate:         57                       P:            22

ID:           [id number]                      QRS:          51

QRSD:         79                       T:            81

QT:           430                                    

QTc:          419                                    

                           Interpretive Statements

Sinus bradycardia

Low QRS complex voltages in the precordial leads

Prior anteroseptal myocardial infarction suggested

Comparison tracing not available

Electronically Signed on 2020 17:37:08 EDT by Pan Miller

## 2020-06-16 NOTE — REP
REASON FOR EXAMINATION:  Preop evaluation.

 

The latest prior for comparison is a portable exam obtained 12/26/2013.

 

FINDINGS:  The superior mediastinal structures are midline.  The cardiac

silhouette is unremarkable in size, shape, and position.  The diaphragmatic

surfaces of the lungs are regular, and the costophrenic angles are clear.  The

pulmonary fields are clear.  The imaged osseous structures are intact.

 

IMPRESSION:

 

There is no acute cardiopulmonary disease.

 

 

Electronically Signed by

Ochoa Zapata DO 06/16/2020 05:04 P

## 2020-06-23 ENCOUNTER — HOSPITAL ENCOUNTER (OUTPATIENT)
Dept: HOSPITAL 53 - M INFU | Age: 75
Discharge: HOME | End: 2020-06-23
Attending: PHYSICIAN ASSISTANT
Payer: MEDICARE

## 2020-06-23 ENCOUNTER — HOSPITAL ENCOUNTER (OUTPATIENT)
Dept: HOSPITAL 53 - M LABSMTC | Age: 75
End: 2020-06-23
Attending: ANESTHESIOLOGY
Payer: MEDICARE

## 2020-06-23 VITALS — SYSTOLIC BLOOD PRESSURE: 132 MMHG | DIASTOLIC BLOOD PRESSURE: 75 MMHG

## 2020-06-23 VITALS — HEIGHT: 59 IN | WEIGHT: 105.82 LBS | BODY MASS INDEX: 21.33 KG/M2

## 2020-06-23 DIAGNOSIS — Z91.048: ICD-10-CM

## 2020-06-23 DIAGNOSIS — G35: Primary | ICD-10-CM

## 2020-06-23 DIAGNOSIS — Z03.818: Primary | ICD-10-CM

## 2020-06-23 PROCEDURE — 96372 THER/PROPH/DIAG INJ SC/IM: CPT

## 2020-06-26 ENCOUNTER — HOSPITAL ENCOUNTER (OUTPATIENT)
Dept: HOSPITAL 53 - M SDC | Age: 75
Discharge: HOME | End: 2020-06-26
Attending: PODIATRIST
Payer: MEDICARE

## 2020-06-26 VITALS — DIASTOLIC BLOOD PRESSURE: 77 MMHG | SYSTOLIC BLOOD PRESSURE: 139 MMHG

## 2020-06-26 VITALS — WEIGHT: 107 LBS | HEIGHT: 58 IN | BODY MASS INDEX: 22.46 KG/M2

## 2020-06-26 DIAGNOSIS — D04.71: Primary | ICD-10-CM

## 2020-06-26 DIAGNOSIS — I25.10: ICD-10-CM

## 2020-06-26 DIAGNOSIS — I10: ICD-10-CM

## 2020-06-26 DIAGNOSIS — E78.49: ICD-10-CM

## 2020-06-26 DIAGNOSIS — Z79.899: ICD-10-CM

## 2020-06-26 PROCEDURE — 88305 TISSUE EXAM BY PATHOLOGIST: CPT

## 2020-06-26 PROCEDURE — 88332 PATH CONSLTJ SURG EA ADD BLK: CPT

## 2020-06-26 PROCEDURE — 88331 PATH CONSLTJ SURG 1 BLK 1SPC: CPT

## 2020-06-26 PROCEDURE — 14020 TIS TRNFR S/A/L 10 SQ CM/<: CPT

## 2020-06-30 ENCOUNTER — HOSPITAL ENCOUNTER (OUTPATIENT)
Dept: HOSPITAL 53 - M INFU | Age: 75
Discharge: HOME | End: 2020-06-30
Attending: PHYSICIAN ASSISTANT
Payer: MEDICARE

## 2020-06-30 VITALS — DIASTOLIC BLOOD PRESSURE: 61 MMHG | SYSTOLIC BLOOD PRESSURE: 134 MMHG

## 2020-06-30 VITALS — SYSTOLIC BLOOD PRESSURE: 127 MMHG | DIASTOLIC BLOOD PRESSURE: 61 MMHG

## 2020-06-30 VITALS — HEIGHT: 59 IN | BODY MASS INDEX: 21.33 KG/M2 | WEIGHT: 105.82 LBS

## 2020-06-30 DIAGNOSIS — G35: Primary | ICD-10-CM

## 2020-06-30 PROCEDURE — 96372 THER/PROPH/DIAG INJ SC/IM: CPT

## 2020-07-01 NOTE — RO
DATE OF PROCEDURE:  06/26/2020

 

PREPROCEDURE DIAGNOSIS:  Squamous cell carcinoma.

 

POSTPROCEDURE DIAGNOSIS:  Squamous cell carcinoma.

 

PROCEDURE:  Biopsy with frozen section and triple Rhomboid flap closure.

 

SURGEON:  Dr. Feliciano Cuadra.

 

ASSISTANT:

 

ANESTHESIA:  Local monitored anesthesia care (MAC).

 

IRRIGATION:  Dilute bacitracin, neomycin and polymyxin B solution.

 

HEMOSTASIS:  None.

 

DESCRIPTION OF PROCEDURE:  On 06/26/2020, this 74-year-old female was taken from

her hospital room to the operating room and placed on the operating table in

supine position following the induction of IV sedation and local and regional

anesthesia.   Attention was directed to the dorsal surface of the patient's right

foot where was noted to be an area of squamous cell carcinoma previously biopsied

and a planned excision with frozen section to secure free borders followed by a

triple Rhomboid flap closure was planned.

 

Attention was direct to the dorsal surface of the foot and the area to be excised

was 2.6 cm x 2.0 cm.  A triple Rhomboid flap was constructed from this and the

area was excised with a suture marker at the 6-o'clock position.  This was sent

for frozen section, reviewed with the pathologist and the diagnosis of squamous

carcinoma in situ with free borders.

 

Three Rhomboid arms were then constructed to cover the surgical defect. These

were full thickness and the flaps were rotated, closed with #4-0 nylon suture in

a simple interrupted fashion.  A TLS drain was placed under the wound bed to

prevent any hematoma formation and a compressive dressing was applied consisting

of Adaptic, 4 x 4s, 4 x 4 splints and Kerlix and Coban.

 

Patient having apparently tolerated the surgical procedure well was taken from

the operating room to the recovery room for further monitoring by the anesthesia

department.  All surgical specimens were removed during the operative procedure

were sent to pathology for gross and microscopic examination.

## 2020-07-07 ENCOUNTER — HOSPITAL ENCOUNTER (OUTPATIENT)
Dept: HOSPITAL 53 - M INFU | Age: 75
Discharge: HOME | End: 2020-07-07
Attending: PHYSICIAN ASSISTANT
Payer: MEDICARE

## 2020-07-07 VITALS — BODY MASS INDEX: 21.33 KG/M2 | WEIGHT: 105.82 LBS | HEIGHT: 59 IN

## 2020-07-07 VITALS — DIASTOLIC BLOOD PRESSURE: 59 MMHG | SYSTOLIC BLOOD PRESSURE: 130 MMHG

## 2020-07-07 DIAGNOSIS — G35: Primary | ICD-10-CM

## 2020-07-07 PROCEDURE — 96372 THER/PROPH/DIAG INJ SC/IM: CPT

## 2020-07-14 ENCOUNTER — HOSPITAL ENCOUNTER (OUTPATIENT)
Dept: HOSPITAL 53 - M INFU | Age: 75
Discharge: HOME | End: 2020-07-14
Attending: PHYSICIAN ASSISTANT
Payer: MEDICARE

## 2020-07-14 VITALS — HEIGHT: 59 IN | WEIGHT: 105.82 LBS | BODY MASS INDEX: 21.33 KG/M2

## 2020-07-14 VITALS — DIASTOLIC BLOOD PRESSURE: 58 MMHG | SYSTOLIC BLOOD PRESSURE: 129 MMHG

## 2020-07-14 DIAGNOSIS — G35: Primary | ICD-10-CM

## 2020-07-14 PROCEDURE — 96401 CHEMO ANTI-NEOPL SQ/IM: CPT

## 2020-07-21 ENCOUNTER — HOSPITAL ENCOUNTER (OUTPATIENT)
Dept: HOSPITAL 53 - M INFU | Age: 75
Discharge: HOME | End: 2020-07-21
Attending: PHYSICIAN ASSISTANT
Payer: MEDICARE

## 2020-07-21 VITALS — WEIGHT: 105.82 LBS | BODY MASS INDEX: 21.33 KG/M2 | HEIGHT: 59 IN

## 2020-07-21 VITALS — DIASTOLIC BLOOD PRESSURE: 63 MMHG | SYSTOLIC BLOOD PRESSURE: 143 MMHG

## 2020-07-21 DIAGNOSIS — G35: Primary | ICD-10-CM

## 2020-07-21 PROCEDURE — 96401 CHEMO ANTI-NEOPL SQ/IM: CPT

## 2020-07-28 ENCOUNTER — HOSPITAL ENCOUNTER (OUTPATIENT)
Dept: HOSPITAL 53 - M INFU | Age: 75
Discharge: HOME | End: 2020-07-28
Attending: PHYSICIAN ASSISTANT
Payer: MEDICARE

## 2020-07-28 DIAGNOSIS — G35: Primary | ICD-10-CM

## 2020-07-28 PROCEDURE — 96401 CHEMO ANTI-NEOPL SQ/IM: CPT

## 2020-08-04 ENCOUNTER — HOSPITAL ENCOUNTER (OUTPATIENT)
Dept: HOSPITAL 53 - M INFU | Age: 75
Discharge: HOME | End: 2020-08-04
Attending: PHYSICIAN ASSISTANT
Payer: MEDICARE

## 2020-08-04 DIAGNOSIS — G35: Primary | ICD-10-CM

## 2020-08-04 PROCEDURE — 96401 CHEMO ANTI-NEOPL SQ/IM: CPT

## 2020-08-11 ENCOUNTER — HOSPITAL ENCOUNTER (OUTPATIENT)
Dept: HOSPITAL 53 - M INFU | Age: 75
Discharge: HOME | End: 2020-08-11
Attending: PHYSICIAN ASSISTANT
Payer: MEDICARE

## 2020-08-11 DIAGNOSIS — G35: Primary | ICD-10-CM

## 2020-08-11 PROCEDURE — 96401 CHEMO ANTI-NEOPL SQ/IM: CPT

## 2020-08-18 ENCOUNTER — HOSPITAL ENCOUNTER (OUTPATIENT)
Dept: HOSPITAL 53 - M INFU | Age: 75
Discharge: HOME | End: 2020-08-18
Attending: PHYSICIAN ASSISTANT
Payer: MEDICARE

## 2020-08-18 ENCOUNTER — HOSPITAL ENCOUNTER (OUTPATIENT)
Dept: HOSPITAL 53 - M INFU | Age: 75
End: 2020-08-18
Attending: PHYSICIAN ASSISTANT
Payer: MEDICARE

## 2020-08-18 DIAGNOSIS — G35: Primary | ICD-10-CM

## 2020-08-18 DIAGNOSIS — Z79.899: ICD-10-CM

## 2020-08-18 PROCEDURE — 36415 COLL VENOUS BLD VENIPUNCTURE: CPT

## 2020-08-18 PROCEDURE — 96401 CHEMO ANTI-NEOPL SQ/IM: CPT

## 2020-08-18 PROCEDURE — 85025 COMPLETE CBC W/AUTO DIFF WBC: CPT

## 2020-08-18 PROCEDURE — 80053 COMPREHEN METABOLIC PANEL: CPT

## 2020-08-25 ENCOUNTER — HOSPITAL ENCOUNTER (OUTPATIENT)
Dept: HOSPITAL 53 - M INFU | Age: 75
Discharge: HOME | End: 2020-08-25
Attending: PHYSICIAN ASSISTANT
Payer: MEDICARE

## 2020-08-25 VITALS — SYSTOLIC BLOOD PRESSURE: 140 MMHG | DIASTOLIC BLOOD PRESSURE: 85 MMHG

## 2020-08-25 VITALS — HEIGHT: 59 IN | WEIGHT: 105.82 LBS | BODY MASS INDEX: 21.33 KG/M2

## 2020-08-25 DIAGNOSIS — G35: Primary | ICD-10-CM

## 2020-08-25 PROCEDURE — 96401 CHEMO ANTI-NEOPL SQ/IM: CPT

## 2020-09-01 ENCOUNTER — HOSPITAL ENCOUNTER (INPATIENT)
Dept: HOSPITAL 53 - M ED | Age: 75
LOS: 36 days | Discharge: NURSING, REHAB OR LONG-TERM CARE | DRG: 59 | End: 2020-10-07
Attending: INTERNAL MEDICINE | Admitting: INTERNAL MEDICINE
Payer: MEDICARE

## 2020-09-01 VITALS — HEIGHT: 58 IN | WEIGHT: 104.28 LBS | BODY MASS INDEX: 21.89 KG/M2
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
## 2020-09-01 DIAGNOSIS — Z91.048: ICD-10-CM

## 2020-09-01 DIAGNOSIS — Z87.891: ICD-10-CM

## 2020-09-01 DIAGNOSIS — Z79.899: ICD-10-CM

## 2020-09-01 DIAGNOSIS — I95.2: ICD-10-CM

## 2020-09-01 DIAGNOSIS — K59.00: ICD-10-CM

## 2020-09-01 DIAGNOSIS — Z79.82: ICD-10-CM

## 2020-09-01 DIAGNOSIS — N39.0: ICD-10-CM
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
## 2020-09-01 DIAGNOSIS — I13.0: ICD-10-CM

## 2020-09-01 DIAGNOSIS — G35: Primary | ICD-10-CM

## 2020-09-01 DIAGNOSIS — I50.9: ICD-10-CM

## 2020-09-01 DIAGNOSIS — N17.9: ICD-10-CM

## 2020-09-01 DIAGNOSIS — I25.2: ICD-10-CM

## 2020-09-01 DIAGNOSIS — I25.10: ICD-10-CM

## 2020-09-01 DIAGNOSIS — B96.20: ICD-10-CM

## 2020-09-01 DIAGNOSIS — I73.00: ICD-10-CM

## 2020-09-01 DIAGNOSIS — R33.9: ICD-10-CM

## 2020-09-01 DIAGNOSIS — N18.30: ICD-10-CM

## 2020-09-01 DIAGNOSIS — T44.5X5A: ICD-10-CM

## 2020-09-01 DIAGNOSIS — S32.10XA: ICD-10-CM

## 2020-09-01 LAB
ALBUMIN SERPL BCG-MCNC: 3.1 GM/DL (ref 3.2–5.2)
ALT SERPL W P-5'-P-CCNC: 40 U/L (ref 12–78)
APTT BLD: 35.1 SECONDS (ref 25–38.4)
BASOPHILS # BLD AUTO: 0 10^3/UL (ref 0–0.2)
BASOPHILS NFR BLD AUTO: 0.1 % (ref 0–1)
BILIRUB CONJ SERPL-MCNC: 0.3 MG/DL (ref 0–0.2)
BILIRUB SERPL-MCNC: 1 MG/DL (ref 0.2–1)
BUN SERPL-MCNC: 35 MG/DL (ref 7–18)
CALCIUM SERPL-MCNC: 8.8 MG/DL (ref 8.8–10.2)
CHLORIDE SERPL-SCNC: 105 MEQ/L (ref 98–107)
CK MB CFR.DF SERPL CALC: 1.23
CK MB SERPL-MCNC: < 1 NG/ML (ref ?–3.6)
CK SERPL-CCNC: 81 U/L (ref 26–192)
CO2 SERPL-SCNC: 22 MEQ/L (ref 21–32)
CREAT SERPL-MCNC: 1.85 MG/DL (ref 0.55–1.3)
EOSINOPHIL # BLD AUTO: 0 10^3/UL (ref 0–0.5)
EOSINOPHIL NFR BLD AUTO: 0 % (ref 0–3)
GFR SERPL CREATININE-BSD FRML MDRD: 28.4 ML/MIN/{1.73_M2} (ref 39–?)
GLUCOSE SERPL-MCNC: 108 MG/DL (ref 70–100)
HBV CORE IGM SER QL: NEGATIVE
HBV SURFACE AB SER QL: NEGATIVE
HBV SURFACE AB SER-ACNC: NEGATIVE M[IU]/ML
HCT VFR BLD AUTO: 36 % (ref 36–47)
HCV AB SER QL: 0.3 INDEX (ref ?–0.8)
HGB BLD-MCNC: 12 G/DL (ref 12–15.5)
INR PPP: 1.1
LYMPHOCYTES # BLD AUTO: 1.3 10^3/UL (ref 1.5–5)
LYMPHOCYTES NFR BLD AUTO: 7.2 % (ref 24–44)
MCH RBC QN AUTO: 32.4 PG (ref 27–33)
MCHC RBC AUTO-ENTMCNC: 33.3 G/DL (ref 32–36.5)
MCV RBC AUTO: 97.3 FL (ref 80–96)
MONOCYTES # BLD AUTO: 1.2 10^3/UL (ref 0–0.8)
MONOCYTES NFR BLD AUTO: 7 % (ref 0–5)
NEUTROPHILS # BLD AUTO: 15.2 10^3/UL (ref 1.5–8.5)
NEUTROPHILS NFR BLD AUTO: 84.9 % (ref 36–66)
PLATELET # BLD AUTO: 173 10^3/UL (ref 150–450)
POTASSIUM SERPL-SCNC: 4.1 MEQ/L (ref 3.5–5.1)
PROT SERPL-MCNC: 6.4 GM/DL (ref 6.4–8.2)
PROTHROMBIN TIME: 14.5 SECONDS (ref 11.8–14)
RBC # BLD AUTO: 3.7 10^6/UL (ref 4–5.4)
SODIUM SERPL-SCNC: 138 MEQ/L (ref 136–145)
TROPONIN I SERPL-MCNC: < 0.02 NG/ML (ref ?–0.1)
WBC # BLD AUTO: 17.8 10^3/UL (ref 4–10)

## 2020-09-01 RX ADMIN — SODIUM CHLORIDE SCH MLS/HR: 9 INJECTION, SOLUTION INTRAVENOUS at 19:45

## 2020-09-01 RX ADMIN — MULTIPLE VITAMINS W/ MINERALS TAB SCH TAB: TAB at 02:30

## 2020-09-01 NOTE — REPVR
PROCEDURE INFORMATION: 

Exam: XR Pelvis 

Exam date and time: 9/1/2020 4:55 PM 

Age: 75 years old 

Clinical indication: Injury or trauma; Fall; Initial encounter; Sprain or 

strain; Does not apply; Pelvic region; Additional info: Fall, head and coccyx 

injury 



TECHNIQUE: 

Imaging protocol: XR pelvis. 

Views: 1 or 2 view. 



COMPARISON: 

No relevant prior studies available. 



FINDINGS: 

Bones/joints: Bone mineralization is decreased, suggestive of osteopenia. There 

is no acute fracture or dislocation. Mild degenerative changes of the hips are 

noted. The sacrum is obscured by overlying bowel gas.

Soft tissues: Soft tissue calcification is noted overlying the iliac wings, 

more pronounced on the right. 



IMPRESSION: 

No acute abnormality. 



Electronically signed by: Tom Esposito On 09/01/2020  17:01:12 PM

## 2020-09-01 NOTE — REPVR
PROCEDURE INFORMATION: 

Exam: CT Cervical Spine Without Contrast 

Exam date and time: 9/1/2020 3:42 PM 

Age: 75 years old 

Clinical indication: Injury or trauma; Fall; Initial encounter; Blunt trauma; 

Additional info: Fall, head injury 



TECHNIQUE: 

Imaging protocol: Computed tomography images of the cervical spine without 

contrast. 

Radiation optimization: All CT scans at this facility use at least one of these 

dose optimization techniques: automated exposure control; mA and/or kV 

adjustment per patient size (includes targeted exams where dose is matched to 

clinical indication); or iterative reconstruction. 



COMPARISON: 

No relevant prior studies available. 



FINDINGS: 

Vertebrae: No acute fracture. Normal alignment. 

Discs/Spinal canal/Neural foramina: Moderate degenerative changes of the 

cervical spine are present. There is no significant spinal canal stenosis. 



Soft tissues: Unremarkable. 

Lungs: Lung apices are normal. 



IMPRESSION: 

No acute abnormality. 



Electronically signed by: Tom Esposito On 09/01/2020  16:11:07 PM

## 2020-09-01 NOTE — REPVR
PROCEDURE INFORMATION: 

Exam: XR Chest, 1 View 

Exam date and time: 9/1/2020 4:55 PM 

Age: 75 years old 

Clinical indication: Injury or trauma; Fall; Initial encounter; Sprain or 

strain; Additional info: Fall, head and coccyx injury 



TECHNIQUE: 

Imaging protocol: XR of the chest 

Views: 1 view. 



COMPARISON: 

CR Chest, 2 view PA, Lat 6/16/2020 2:39 PM 



FINDINGS: 

Lungs: Unremarkable. No consolidation. 

Pleural space: Unremarkable. No pleural effusion. No pneumothorax. 

Heart/Mediastinum: Unremarkable. No cardiomegaly. 

Vasculature: Atherosclerotic calcifications are noted within the aortic arch. 

Bones/joints: Unremarkable. 



IMPRESSION: 

No acute abnormality. 



Electronically signed by: Tom Esposito On 09/01/2020  16:59:02 PM

## 2020-09-01 NOTE — REPVR
PROCEDURE INFORMATION: 

Exam: XR Lumbosacral Spine, 4 or 5 Views 

Exam date and time: 9/1/2020 4:55 PM 

Age: 75 years old 

Clinical indication: Injury or trauma; Fall; Initial encounter; Sprain or 

strain, lumbar ligaments; Additional info: Fall, head and coccyx injury 



TECHNIQUE: 

Imaging protocol: XR of the lumbosacral spine, 4 or 5 views. 



COMPARISON: 

No relevant prior studies available. 



FINDINGS: 

Vertebrae: Bone mineralization is decreased, suggestive of osteoporosis. No 

acute compression fracture is identified within the lumbar spine. There are 

possible age-indeterminate insufficiency fractures involving the sacrum, best 

visualized on the lateral radiograph. 

Soft tissues: Unremarkable. 

Vasculature: Atherosclerotic calcifications are noted within the aorta and its 

branches. 



IMPRESSION: 

1. No acute lumbar spine fracture 

2. Possible age-indeterminate sacral insufficiency fractures. A CT or MRI could 

be obtained for additional information. 



Electronically signed by: Tom Esposito On 09/01/2020  17:03:16 PM

## 2020-09-01 NOTE — HPEPDOC
[facility] Medical History & Physical


Date of Admission


Sep 1, 2020


Date of Service:  Sep 1, 2020


Primary Care Physician:  Tramaine Soliz MD


Attending Physician:  CYNDI EM MD





History and Physical


TIME OF SERVICE: 835pm





CHIEF COMPLAINT: fall





HISTORY OF PRESENT ILLNESS: 


This 75 yr old F w a hx of chronic right sided weakness 2/2 MS presented to the 

hospital for evaluation after suddenly having a fall backwards onto her back and

developed back pain; she denies hitting her head or losing consciousness. She 

denies frequently falling, feeling dizzy, having chest pain, having palpit

ations, having blurry vision, having shortness of breath, or having abdominal 

pain prior to the fall. She was scheduled to have her Avonex infusion today but 

missed the appointment bc she came to the ER.


CT of the head showed periventricular white matter disease likely due to MS; the

case was discussed with  who recommended 1G of solumedrol daily for 3 

days. 





REVIEW OF SYSTEMS: 12 point review of systems negative except as listed in HPI





PAST MEDICAL/ SURGICAL HISTORY:


MS w right sided weakness uses walker


Raynauds 


CKD 3


Chronic HTN


CHF unspecified type


Chronic CAD / hx of MI


Tonsillectomy


Tubal Libation


Right sided CEA





SOCIAL HISTORY:


Former smoker/ doesnt drink alcohol / lives w spouse /retired





FAMILY HISTORY:


CAD/MI  mother





ALLERGIES: Please see below.





HOME MEDICATIONS: Please see below. 





PHYSICAL EXAM


Vital Signs








  Date Time  Temp Pulse Resp B/P (MAP) Pulse Ox O2 Delivery O2 Flow Rate FiO2


 


9/1/20 14:46 98.5 67 18 115/58 (77) 96 Room Air  





GENERAL APPEARANCE: well-nourished/ well developed /NAD


HEENT: no scleral icterus / EOMI


CARDIOVASCULAR: RRR/NMRG / radial pulses intact


LUNGS: CTAB on RA


ABDOMEN: soft & not tender w palpitation


MUSCULOSKELETAL: TONY in upper extremities but limited by pain in lower 

extremities


INTEGUMENT: no generalized pallor


NEUROLOGICAL: CN -12 intact / speech not dysarthric / strength 5/5 in LUE, 4/5 

RUE and LLE, -3/5 RLE


PSYCHIATRIC: A&Ox 3 /able to understand and follow all commands





LABORATORY DATA: 


9/1/20 15:25





9/1/20 15:25: 


Immature Granulocyte % (Auto) 0.8, Neutrophils (%) (Auto) 84.9H, Lymphocytes (%)

(Auto) 7.2L, Monocytes (%) (Auto) 7.0H, Eosinophils (%) (Auto) 0.0, Basophils 

(%) (Auto) 0.1, Neutrophils # (Auto) 15.2H, Lymphocytes # (Auto) 1.3L, Monocytes

# (Auto) 1.2H, Eosinophils # (Auto) 0.0, Basophils # (Auto) 0.0, Nucleated Red 

Blood Cells % (auto) 0.0, Prothrombin Time 14.5H, Prothromb Time International 

Ratio 1.10, Activated Partial Thromboplast Time 35.1, Anion Gap 11, Glomerular 

Filtration Rate 28.4L, Calcium Level 8.8, Total Bilirubin 1.0, Direct Bilirubin 

0.3H, Aspartate Amino Transf (AST/SGOT) 44H, Alanine Aminotransferase (ALT/SGPT)

40, Alkaline Phosphatase 99, Total Creatine Kinase 81, Creatine Kinase MB < 1.0,

Creatine Kinase MB Relative Index 1.23, Troponin I < 0.02, Total Protein 6.4, 

Albumin 3.1L, Albumin/Globulin Ratio 0.9L


9/1/20 17:02: 


Urine Color DK YELLOW, Urine Appearance CLOUDYH, Urine pH 5.0, Urine Specific 

Gravity 1.017, Urine Protein 1+H, Urine Glucose (UA) NEGATIVE, Urine Ketones 

1+H, Urine Blood NEGATIVE, Urine Nitrite NEGATIVE, Urine Bilirubin NEGATIVE, 

Urine Urobilinogen 0.2, Urine Leukocyte Esterase 1+H, Urine WBC (Auto) 46H, 

Urine RBC (Auto) 2, Urine Hyaline Casts (Auto) 1, Urine Bacteria (Auto) 1+H, 

Urine Squamous Epithelial Cells 0, Urine Mucus (Auto) SMALL, Urine Sperm (Auto) 





IMAGING: 


Xray chest No acute abnormality.


Xray pelvis: No acute abnormality.


Xray lumbar spine: 1. No acute lumbar spine fracture 2. Possible age-

indeterminate sacral insufficiency fractures. A CT or MRI could be obtained for 

additional


information.


CT head 1. No acute intracranial abnormality. 2. Chronic findings as discussed 

above. 


CT cervical spine No acute abnormality.





MICROBIOLOGY:


9/1/20 Urine Culture, Received


         Pending


9/1/20 Blood Culture, Received


         Pending


9/1/20 Blood Culture, Received


         Pending





ASSESSMENT: 


 is a 75 yr old w a hx of MS w right sided weakness that requires her to 

ambulate w a walker who presented to the ER after suddenly having a fall; this 

may be due to acute MS vs Pseudoexacerbation of MS due to UTI. 





PLAN:


1 Acute MS vs Pseudoexacerbation of MS due to UTI


Plan: admit to medical floor / c/w solumedrol 1 g for 3 days / PT consult / day 

time team can call  or aMme to determine if they would like an MRI of the

brain this admission as well





2 Possible UTI ?


The UA is positive, but didnt have abd pain or urinary symptoms prior to the 

fall.


The only SIRS criteria she has is leukocytosis


Plan: c/w levofloxacin pending UCx and blood cx 





3 ANTONIETA on CKD 3


Cr increased from 1.38 to 1.85


Plan: IVF/ f/u renal US & Ulytes for FENa / f/u w  as scheduled





4 Back Pain 2/2 Sacral Fractures / Osteoporosis


By definition she has osteoporosis bc of the vertebral fx;


Plan: Flector patch + K pad & Norco PRN pain /  f/u CT of lumbar spine in AM, 

pending results the day time team consult  Ortho consult /   PT consult / check 

TSH, and Vitamin D/ f/u w PCP for DEXA scan based on her GFR she will likely be 

a candidate for Denosumab which can be started by her PCP / in the meanwhile nurys

l start Ca w Vitamin D supplements





5 Transaminitis


Since she was born in [birth year] she is a candidate for Hep C screening


Plan: f/u Hep panel and Liver US / trend LFTs





6 Chronic HTN / CHF unspecified type


Plan: hydralazine, losartan, nebivolol





7. Chronic CAD / hx of MI


Plan: ASA, nebivolol





DVT Px Lovenox (Padau score 5 = pharmacological px indicated)





Dispo: home after more than 2 midnights stay





Home Medications


Scheduled


Aspirin (Aspirin EC) 81 Mg Tablet., 81 MG PO QHS


Baclofen (Baclofen) 20 Mg Tablet, 20 MG PO QHS


Cranberry (Cranberry) 400 Mg Capsule, 400 MG PO QHS


Hydralazine HCl (Hydralazine HCl) 50 Mg Tab, 50 MG PO BID


Interferon Beta-1A (Avonex) 30 Mcg/0.5 Ml Syringekit, 30 MCG IM QWEEK


   TUESDAYS 


Lactobacillus Combo No.10 (Probiotic) 1 Each Capsule, 1 TAB PO DAILY


Losartan Potassium (Losartan Potassium) 100 Mg Tablet, 100 MG PO DAILY


Multivit-Min/FA/Lycopen/Lutein (Centrum Silver Tablet) 1 Each Tablet, 1 TAB PO 

QHS


Nebivolol HCl (Bystolic) 5 Mg Tab, 5 MG PO DAILY


Ubidecarenone/Vit E Acet (Co Q-10 100 mg Softgel) 1 Each Capsule, 100 MG PO QHS





Allergies


Coded Allergies:  


     METALS (Verified  Allergy, Intermediate, rash, 9/1/20)





A-FIB/CHADSVASC


A-FIB History


Current/History of A-Fib/PAF?:  No


Current PO Anticoag Therapy:  No











CYNDI EM MD                 Sep 1, 2020 22:27

## 2020-09-01 NOTE — REPVR
PROCEDURE INFORMATION: 

Exam: CT Head Without Contrast 

Exam date and time: 9/1/2020 3:42 PM 

Age: 75 years old 

Clinical indication: Injury or trauma; Fall; Initial encounter; Blunt trauma 

(contusions or hematomas); Additional info: Fall, head injury 



TECHNIQUE: 

Imaging protocol: Computed tomography of the head without contrast. 

Radiation optimization: All CT scans at this facility use at least one of these 

dose optimization techniques: automated exposure control; mA and/or kV 

adjustment per patient size (includes targeted exams where dose is matched to 

clinical indication); or iterative reconstruction. 



COMPARISON: 

No relevant prior studies available. 



FINDINGS: 

Brain: There is no acute intracranial hemorrhage, cerebral edema, or midline 

shift. Patchy decreased attenuation is noted in the periventricular white 

matter. Chronic ischemic changes or chronic demyelinating disease is possible. 

Age-related cerebral and cerebellar volume loss is present. 

Ventricles: Mild ex vacuo dilation of the lateral and third ventricles is 

noted. 

Bones/joints: No acute fracture. 

Sinuses: Left sphenoid sinusitis is present. 

Mastoid air cells: The mastoid air cells are clear. 

Orbits: The included orbital structures are unremarkable. 

Vasculature: Atherosclerotic calcifications are seen involving the cavernous 

carotid arteries. 

Soft tissues: Unremarkable. 



IMPRESSION: 

1. No acute intracranial abnormality. 

2. Chronic findings as discussed above. 



Electronically signed by: Tom Esposito On 09/01/2020  16:04:16 PM

## 2020-09-02 VITALS — SYSTOLIC BLOOD PRESSURE: 123 MMHG | DIASTOLIC BLOOD PRESSURE: 54 MMHG

## 2020-09-02 VITALS — DIASTOLIC BLOOD PRESSURE: 65 MMHG | SYSTOLIC BLOOD PRESSURE: 145 MMHG

## 2020-09-02 LAB
25(OH)D3 SERPL-MCNC: 41.1 NG/ML (ref 30–100)
ALBUMIN SERPL BCG-MCNC: 2.7 GM/DL (ref 3.2–5.2)
ALT SERPL W P-5'-P-CCNC: 33 U/L (ref 12–78)
BILIRUB SERPL-MCNC: 0.6 MG/DL (ref 0.2–1)
BUN SERPL-MCNC: 41 MG/DL (ref 7–18)
CALCIUM SERPL-MCNC: 8.1 MG/DL (ref 8.8–10.2)
CHLORIDE SERPL-SCNC: 107 MEQ/L (ref 98–107)
CO2 SERPL-SCNC: 23 MEQ/L (ref 21–32)
CREAT SERPL-MCNC: 1.64 MG/DL (ref 0.55–1.3)
CREAT UR-MCNC: 100 MG/DL
GFR SERPL CREATININE-BSD FRML MDRD: 32.6 ML/MIN/{1.73_M2} (ref 39–?)
GLUCOSE SERPL-MCNC: 173 MG/DL (ref 70–100)
HCT VFR BLD AUTO: 34.3 % (ref 36–47)
HGB BLD-MCNC: 11.6 G/DL (ref 12–15.5)
MAGNESIUM SERPL-MCNC: 2.2 MG/DL (ref 1.8–2.4)
MCH RBC QN AUTO: 32.6 PG (ref 27–33)
MCHC RBC AUTO-ENTMCNC: 33.8 G/DL (ref 32–36.5)
MCV RBC AUTO: 96.3 FL (ref 80–96)
PLATELET # BLD AUTO: 172 10^3/UL (ref 150–450)
POTASSIUM SERPL-SCNC: 4.3 MEQ/L (ref 3.5–5.1)
PROT SERPL-MCNC: 6.2 GM/DL (ref 6.4–8.2)
PROT UR-MCNC: 72.7 MG/DL (ref 0–12)
RBC # BLD AUTO: 3.56 10^6/UL (ref 4–5.4)
SODIUM SERPL-SCNC: 139 MEQ/L (ref 136–145)
SODIUM UR-SCNC: 23 MEQ/L
TSH SERPL DL<=0.005 MIU/L-ACNC: 0.41 UIU/ML (ref 0.36–3.74)
UUN UR-MCNC: 716 MG/DL
WBC # BLD AUTO: 13.7 10^3/UL (ref 4–10)

## 2020-09-02 RX ADMIN — ENOXAPARIN SODIUM SCH MG: 40 INJECTION SUBCUTANEOUS at 14:02

## 2020-09-02 RX ADMIN — Medication SCH MG: at 23:09

## 2020-09-02 RX ADMIN — HYDRALAZINE HYDROCHLORIDE SCH MG: 50 TABLET, FILM COATED ORAL at 02:30

## 2020-09-02 RX ADMIN — LOSARTAN POTASSIUM SCH MG: 50 TABLET, FILM COATED ORAL at 14:02

## 2020-09-02 RX ADMIN — HYDROCODONE BITARTRATE AND ACETAMINOPHEN PRN TAB: 5; 325 TABLET ORAL at 14:03

## 2020-09-02 RX ADMIN — NEBIVOLOL HYDROCHLORIDE SCH MG: 5 TABLET ORAL at 14:04

## 2020-09-02 RX ADMIN — BACLOFEN SCH MG: 10 TABLET ORAL at 23:10

## 2020-09-02 RX ADMIN — LEVOFLOXACIN SCH MLS/HR: 5 INJECTION, SOLUTION INTRAVENOUS at 02:30

## 2020-09-02 RX ADMIN — PROBIOTIC PRODUCT - TAB SCH EA: TAB at 14:03

## 2020-09-02 RX ADMIN — SODIUM CHLORIDE SCH MLS/HR: 9 INJECTION, SOLUTION INTRAVENOUS at 16:44

## 2020-09-02 RX ADMIN — HYDRALAZINE HYDROCHLORIDE SCH MG: 50 TABLET, FILM COATED ORAL at 23:12

## 2020-09-02 RX ADMIN — Medication SCH MG: at 14:03

## 2020-09-02 RX ADMIN — MULTIPLE VITAMINS W/ MINERALS TAB SCH TAB: TAB at 23:10

## 2020-09-02 RX ADMIN — ASPIRIN SCH MG: 81 TABLET ORAL at 02:30

## 2020-09-02 RX ADMIN — DICLOFENAC EPOLAMINE SCH PATCH: 0.01 SYSTEM TOPICAL at 21:00

## 2020-09-02 RX ADMIN — ASPIRIN SCH MG: 81 TABLET ORAL at 23:11

## 2020-09-02 RX ADMIN — SODIUM CHLORIDE SCH MLS/HR: 9 INJECTION, SOLUTION INTRAVENOUS at 15:10

## 2020-09-02 RX ADMIN — SODIUM CHLORIDE SCH MLS/HR: 9 INJECTION, SOLUTION INTRAVENOUS at 12:00

## 2020-09-02 RX ADMIN — DICLOFENAC EPOLAMINE SCH PATCH: 0.01 SYSTEM TOPICAL at 14:03

## 2020-09-02 RX ADMIN — BACLOFEN SCH MG: 10 TABLET ORAL at 02:30

## 2020-09-02 RX ADMIN — HYDRALAZINE HYDROCHLORIDE SCH MG: 50 TABLET, FILM COATED ORAL at 14:04

## 2020-09-02 NOTE — REPVR
PROCEDURE INFORMATION: 

Exam: MR Head Without Contrast 

Exam date and time: 9/2/2020 10:15 PM 

Age: 75 years old 

Clinical indication: Condition or disease; Multiple sclerosis; Patient HX: Ms 

flair up 



TECHNIQUE: 

Imaging protocol: MR of the head without contrast. 

3D rendering (Not supervised by radiologist): MIP and/or 3D reconstructed 

images were created by the technologist. 



COMPARISON: 

CT Head without contrast 9/1/2020 3:35 PM 



FINDINGS: 



Brain: Stable size and appearance of prominent T2/FLAIR hyperintense white 

matter lesions along the periventricular white matter and corpus callosum. No 

new white matter lesions. No no evidence of acute intracranial hemorrhage or 

extra-axial fluid collection. No evidence of mass effect or midline shift. No 

restricted diffusion to suggest acute infarct. 

Ventricles: Prominence of the ventricles and sulci, likely attributed to 

parenchymal volume loss. 

Bones/joints: Unremarkable. 

Sinuses: Unremarkable. 

Mastoid air cells: No mastoid effusion. 

Orbits: Unremarkable. 

Soft tissues: Unremarkable. 



IMPRESSION: 

Stable size and appearance of prominent T2/FLAIR hyperintense white matter 

lesions along the periventricular white matter and corpus callosum, compatible 

with provided history of multiple sclerosis. No acute findings. 



Electronically signed by: Ernie Levine On 09/02/2020  22:48:48 PM

## 2020-09-02 NOTE — REPVR
PROCEDURE INFORMATION: 

Exam: CT Lumbar Spine Without Contrast 

Exam date and time: 9/2/2020 9:00 AM 

Age: 75 years old 

Clinical indication: Injury or trauma; Fall; Initial encounter; Blunt trauma 

(contusions or hematomas); Additional info: Fall w sacral fractures of unclear 

age on xray 



TECHNIQUE: 

Imaging protocol: Computed tomography images of the lumbar spine without 

contrast. 

Radiation optimization: All CT scans at this facility use at least one of these 

dose optimization techniques: automated exposure control; mA and/or kV 

adjustment per patient size (includes targeted exams where dose is matched to 

clinical indication); or iterative reconstruction. 



COMPARISON: 

CR Spine. Lumbosacral, complete 9/1/2020 4:33 PM 



FINDINGS: 

Vertebrae: Vertebral body height and AP alignment is preserved. Mild to 

moderate prevertebral osteophytosis. No acute lumbar spine fracture. 

Discs/Spinal canal/Neural foramina: No definite significant central canal 

stenosis within limitations of technique. 

Sacrum/coccyx: Nondisplaced fracture involving the right sacrum best 

demonstrated on reconstructed images. 



Vasculature: Vascular calcification. 

Soft tissues: See "Vertebrae" finding. 



IMPRESSION: 

1. Nondisplaced acute fracture involving the right sacrum best demonstrated on 

reconstructed images. 

2. No acute lumbar spine fracture. 



Electronically signed by: Blair Wright On 09/02/2020  02:18:21 AM

## 2020-09-02 NOTE — IPNPDOC
Subjective


Date Seen


The patient was seen on 9/2/20.





Subjective


Chief Complaint/HPI


pt is comfortable, no new complaints


General:  Denies: ROS Unobtainable, Chills, Night Sweats, Fatigue, Malaise, 

Normal Appetite, Other Symptoms


Constitutional:  Denies: Chills, Fever, Malaise, Night Sweats, Weakness, 

Fatigue, Weight Loss, Lethargy, Other


Pulmonary:  Denies: Dyspnea, Cough, Pleuritic Chest Pain, Other Symptoms


Cardiovascular:  Denies: Chest Pain, Palpitations, Orthopnea, Paroxysmal Noc. 

Dyspnea, Edema, Lt Headedness, Other Symptoms


Gastrointestinal:  Denies: Nausea, Vomiting, Abdominal Pain, Diarrhea, 

Constipation, Melena, Hematochezia, Other Symptoms


Musculoskeletal:  Denies: Neck Pain, Back Pain, Shoulder Pain, Arm Pain, Hand 

Pain, Leg Pain, Foot Pain, Joint Pain, Muscle Pain, Spasms, Other Symptoms


Neurological:  Denies: Weakness, Numbness, Incoordination, Change in speech, 

Confusion, Seizures, Other Symptoms





Objective


Physical Examination


General Exam:  Positive: Alert, Cooperative


Eye Exam:  Positive: PERRLA, Conjunctiva & lids normal


ENT Exam:  Positive: Atraumatic


Neck Exam:  Positive: Supple


Chest Exam:  Positive: Clear to auscultation, Normal air movement


Heart Exam:  Positive: Normal S1, Normal S2


Abdomen Exam:  Positive: Normal bowel sounds


Extremity Exam:  Positive: Normal pulses


Skin Exam:  Positive: Nl turgor and temperature


Psych Exam:  Positive: Mood NL, Oriented x 3





Assessment /Plan


Problems





(1) Multiple sclerosis exacerbation


Status:  Acute


Problem Text:  Pt admitted to med floor last night


Solumedrol 1 gm daily for 3 days as per Dr Guerra's recommendations


MRI Brain ordered


PT eval


Continue home meds 





2





(2) ANTONIETA (acute kidney injury)


Status:  Acute


Problem Text:  ANTONIETA on CKD-III


Cr increased from 1.38 to 1.85


IVF/ f/u renal US & Ulytes for FENa / f/u w  as scheduled








(3) UTI (urinary tract infection)


Status:  Acute


Problem Text:  The UA is positive, but didnt have abd pain or urinary symptoms 

prior to the fall.


The only SIRS criteria she has is leukocytosis


Continue levofloxacin pending UCx and blood cx 





(4) Fall


Status:  Acute


Problem Text:  Hx of back pain 2/2 Sacral Fractures / Osteoporosis


By definition she has osteoporosis bc of the vertebral fx;


Flector patch + K pad & Norco PRN pain /  f/u CT of lumbar spine in AM, pending 

results the day time team consult  Ortho consult /   PT consult / check TSH, and

Vitamin D/ f/u w PCP for DEXA scan based on her GFR she will likely be a 

candidate for Denosumab which can be started by her PCP / in the meanwhile will 

start Ca w Vitamin D supplements


 








Plan/VTE


VTE Prophylaxis Ordered?:  Yes





VS, I&O, 24H, Fishbone


Vital Signs/I&O





Vital Signs








  Date Time  Temp Pulse Resp B/P (MAP) Pulse Ox O2 Delivery O2 Flow Rate FiO2


 


9/2/20 09:49  68 20 144/65 (91) 99   


 


9/2/20 07:55 97.8       


 


9/2/20 05:55      Room Air  














I&O- Last 24 Hours up to 6 AM 


 


 9/2/20





 06:00


 


Output Total 50 ml


 


Balance -50 ml











Laboratory Data


24H LABS


Laboratory Tests 2


9/1/20 15:25: 


Immature Granulocyte % (Auto) 0.8, Neutrophils (%) (Auto) 84.9H, Lymphocytes (%)

(Auto) 7.2L, Monocytes (%) (Auto) 7.0H, Eosinophils (%) (Auto) 0.0, Basophils 

(%) (Auto) 0.1, Neutrophils # (Auto) 15.2H, Lymphocytes # (Auto) 1.3L, Monocytes

# (Auto) 1.2H, Eosinophils # (Auto) 0.0, Basophils # (Auto) 0.0, Nucleated Red 

Blood Cells % (auto) 0.0, Prothrombin Time 14.5H, Prothromb Time International 

Ratio 1.10, Activated Partial Thromboplast Time 35.1, Anion Gap 11, Glomerular 

Filtration Rate 28.4L, Calcium Level 8.8, Total Bilirubin 1.0, Direct Bilirubin 

0.3H, Aspartate Amino Transf (AST/SGOT) 44H, Alanine Aminotransferase (ALT/SGPT)

40, Alkaline Phosphatase 99, Total Creatine Kinase 81, Creatine Kinase MB < 1.0,

Creatine Kinase MB Relative Index 1.23, Troponin I < 0.02, Total Protein 6.4, A

lbumin 3.1L, Albumin/Globulin Ratio 0.9L


9/1/20 17:02: 


Urine Color DK YELLOW, Urine Appearance CLOUDYH, Urine pH 5.0, Urine Specific 

Gravity 1.017, Urine Protein 1+H, Urine Glucose (UA) NEGATIVE, Urine Ketones 

1+H, Urine Blood NEGATIVE, Urine Nitrite NEGATIVE, Urine Bilirubin NEGATIVE, 

Urine Urobilinogen 0.2, Urine Leukocyte Esterase 1+H, Urine WBC (Auto) 46H, 

Urine RBC (Auto) 2, Urine Hyaline Casts (Auto) 1, Urine Bacteria (Auto) 1+H, 

Urine Squamous Epithelial Cells 0, Urine Mucus (Auto) SMALL, Urine Sperm (Auto) 


9/1/20 20:30: 


Lactic Acid Level 1.5, Hepatitis B Surface Antigen NEGATIVE, Hepatitis B Surface

Antibody NEGATIVE, Hepatitis B Core IgM Antibody NEGATIVE, Hepatitis C Antibody 

Index 0.3


9/2/20 06:21: 


Nucleated Red Blood Cells % (auto) 0.0, Anion Gap 9, Glomerular Filtration Rate 

32.6L, Calcium Level 8.1L, Total Bilirubin 0.6, Aspartate Amino Transf 

(AST/SGOT) 36, Alanine Aminotransferase (ALT/SGPT) 33, Alkaline Phosphatase 89, 

Total Protein 6.2L, Albumin 2.7L, Albumin/Globulin Ratio 0.8L, Magnesium Level 

2.2, 25-Hydroxy Vitamin D Total 41.1, Thyroid Stimulating Hormone (TSH) 0.409


9/2/20 10:35: 


Urine Random Creatinine 100.0, Urine Random Total Protein 72.7H, Urine Random 

Sodium 23, Urine Random Urea Nitrogen 716


CBC/BMP


Laboratory Tests


9/1/20 15:25








9/2/20 06:21








Microbiology





Microbiology


9/1/20 Urine Culture, Received


         Pending


9/1/20 Blood Culture, Received


         Pending


9/1/20 Blood Culture - Preliminary, Resulted











QUETA HORN MD               Sep 2, 2020 11:40

## 2020-09-02 NOTE — REPVR
PROCEDURE INFORMATION: 

Exam: US Abdomen, Limited; Right Upper Quadrant 

Exam date and time: 9/2/2020 8:21 AM 

Age: 75 years old 

Clinical indication: Abnormal findings; Abnormal lab test; Other: Corbin and 

transminitis 



TECHNIQUE: 

Imaging protocol: US abdomen. Real time ultrasound with image documentation. 

Limited exam focused on the right upper quadrant. 



COMPARISON: 

RENAL US 10/24/2014 1:38 PM 



FINDINGS: 

Liver: Normal. No masses. 

Gallbladder: Normal. No gallstones. There is no gallbladder wall thickening. 

Common bile duct: Normal. No stones. No dilation. 

Pancreas: Visualized pancreas is unremarkable. 

Right kidney: Normal. No mass. No hydronephrosis. 



IMPRESSION: 

No acute findings. 



Electronically signed by: Justin De Leon On 09/02/2020  08:56:01 AM

## 2020-09-03 VITALS — SYSTOLIC BLOOD PRESSURE: 120 MMHG | DIASTOLIC BLOOD PRESSURE: 60 MMHG

## 2020-09-03 VITALS — DIASTOLIC BLOOD PRESSURE: 66 MMHG | SYSTOLIC BLOOD PRESSURE: 128 MMHG

## 2020-09-03 VITALS — DIASTOLIC BLOOD PRESSURE: 72 MMHG | SYSTOLIC BLOOD PRESSURE: 113 MMHG

## 2020-09-03 LAB
ALBUMIN SERPL BCG-MCNC: 2.7 GM/DL (ref 3.2–5.2)
ALT SERPL W P-5'-P-CCNC: 30 U/L (ref 12–78)
BASOPHILS # BLD AUTO: 0 10^3/UL (ref 0–0.2)
BASOPHILS NFR BLD AUTO: 0.1 % (ref 0–1)
BILIRUB SERPL-MCNC: 0.5 MG/DL (ref 0.2–1)
BUN SERPL-MCNC: 37 MG/DL (ref 7–18)
CALCIUM SERPL-MCNC: 8.9 MG/DL (ref 8.8–10.2)
CHLORIDE SERPL-SCNC: 110 MEQ/L (ref 98–107)
CO2 SERPL-SCNC: 23 MEQ/L (ref 21–32)
CREAT SERPL-MCNC: 1.63 MG/DL (ref 0.55–1.3)
EOSINOPHIL # BLD AUTO: 0 10^3/UL (ref 0–0.5)
EOSINOPHIL NFR BLD AUTO: 0 % (ref 0–3)
GFR SERPL CREATININE-BSD FRML MDRD: 32.8 ML/MIN/{1.73_M2} (ref 39–?)
GLUCOSE SERPL-MCNC: 132 MG/DL (ref 70–100)
HCT VFR BLD AUTO: 34.6 % (ref 36–47)
HGB BLD-MCNC: 11.4 G/DL (ref 12–15.5)
LYMPHOCYTES # BLD AUTO: 1.1 10^3/UL (ref 1.5–5)
LYMPHOCYTES NFR BLD AUTO: 7.3 % (ref 24–44)
MAGNESIUM SERPL-MCNC: 2.3 MG/DL (ref 1.8–2.4)
MCH RBC QN AUTO: 32.3 PG (ref 27–33)
MCHC RBC AUTO-ENTMCNC: 32.9 G/DL (ref 32–36.5)
MCV RBC AUTO: 98 FL (ref 80–96)
MONOCYTES # BLD AUTO: 0.3 10^3/UL (ref 0–0.8)
MONOCYTES NFR BLD AUTO: 1.7 % (ref 0–5)
NEUTROPHILS # BLD AUTO: 13.1 10^3/UL (ref 1.5–8.5)
NEUTROPHILS NFR BLD AUTO: 90.2 % (ref 36–66)
PLATELET # BLD AUTO: 188 10^3/UL (ref 150–450)
POTASSIUM SERPL-SCNC: 3.9 MEQ/L (ref 3.5–5.1)
PROT SERPL-MCNC: 6.1 GM/DL (ref 6.4–8.2)
RBC # BLD AUTO: 3.53 10^6/UL (ref 4–5.4)
SODIUM SERPL-SCNC: 142 MEQ/L (ref 136–145)
WBC # BLD AUTO: 14.5 10^3/UL (ref 4–10)

## 2020-09-03 RX ADMIN — DICLOFENAC EPOLAMINE SCH PATCH: 0.01 SYSTEM TOPICAL at 10:12

## 2020-09-03 RX ADMIN — SODIUM CHLORIDE SCH MLS/HR: 9 INJECTION, SOLUTION INTRAVENOUS at 12:16

## 2020-09-03 RX ADMIN — ENOXAPARIN SODIUM SCH MG: 40 INJECTION SUBCUTANEOUS at 10:12

## 2020-09-03 RX ADMIN — ACETAMINOPHEN PRN MG: 325 TABLET ORAL at 21:07

## 2020-09-03 RX ADMIN — MULTIPLE VITAMINS W/ MINERALS TAB SCH TAB: TAB at 21:06

## 2020-09-03 RX ADMIN — ASPIRIN SCH MG: 81 TABLET ORAL at 21:06

## 2020-09-03 RX ADMIN — SODIUM CHLORIDE SCH MLS/HR: 9 INJECTION, SOLUTION INTRAVENOUS at 01:46

## 2020-09-03 RX ADMIN — SODIUM CHLORIDE SCH MLS/HR: 9 INJECTION, SOLUTION INTRAVENOUS at 10:08

## 2020-09-03 RX ADMIN — HYDRALAZINE HYDROCHLORIDE SCH MG: 50 TABLET, FILM COATED ORAL at 21:06

## 2020-09-03 RX ADMIN — Medication SCH MG: at 21:06

## 2020-09-03 RX ADMIN — LOSARTAN POTASSIUM SCH MG: 50 TABLET, FILM COATED ORAL at 10:11

## 2020-09-03 RX ADMIN — PROBIOTIC PRODUCT - TAB SCH EA: TAB at 10:10

## 2020-09-03 RX ADMIN — HYDRALAZINE HYDROCHLORIDE SCH MG: 50 TABLET, FILM COATED ORAL at 10:55

## 2020-09-03 RX ADMIN — DICLOFENAC EPOLAMINE SCH PATCH: 0.01 SYSTEM TOPICAL at 21:07

## 2020-09-03 RX ADMIN — SODIUM CHLORIDE SCH MLS/HR: 9 INJECTION, SOLUTION INTRAVENOUS at 11:45

## 2020-09-03 RX ADMIN — BACLOFEN SCH MG: 10 TABLET ORAL at 21:06

## 2020-09-03 RX ADMIN — SODIUM CHLORIDE SCH MLS/HR: 9 INJECTION, SOLUTION INTRAVENOUS at 21:05

## 2020-09-03 RX ADMIN — LEVOFLOXACIN SCH MLS/HR: 5 INJECTION, SOLUTION INTRAVENOUS at 21:06

## 2020-09-03 RX ADMIN — ACETAMINOPHEN PRN MG: 325 TABLET ORAL at 10:39

## 2020-09-03 RX ADMIN — Medication SCH MG: at 10:08

## 2020-09-03 RX ADMIN — NEBIVOLOL HYDROCHLORIDE SCH MG: 5 TABLET ORAL at 10:10

## 2020-09-03 NOTE — REPVR
PROCEDURE INFORMATION: 

Exam: CT Pelvis Without Contrast; Skeletal 

Exam date and time: 9/3/2020 3:55 PM 

Age: 75 years old 

Clinical indication: Abnormal findings; Abnormal imaging test; Additional info: 

FX 



TECHNIQUE: 

Imaging protocol: Computed tomography images of the pelvis without contrast. 

Exam focused on the skeletal structures. 

Radiation optimization: All CT scans at this facility use at least one of these 

dose optimization techniques: automated exposure control; mA and/or kV 

adjustment per patient size (includes targeted exams where dose is matched to 

clinical indication); or iterative reconstruction. 



COMPARISON: 

CR Pelvis Ap ONLY 9/1/2020 4:33 PM 



FINDINGS: 



Vasculature: Atherosclerotic calcifications of the aorta and major branches. 

Bones/joints: Acute nondisplaced fracture of the right sacral ala. Acute 

nondisplaced fracture through the S2 vertebral body. Very slight cortical 

deformity through the mid left iliac wing, questionable nondisplaced fracture. 

Soft tissues: Calcified injection granulomata in the bilateral gluteal 

subcutaneous soft tissues. Small degree of presacral soft tissue density, 

suspected presacral hemorrhage in the setting of acute fracture. 



IMPRESSION: 

1. Acute nondisplaced fracture of the right sacral ala. 

2. Acute nondisplaced fracture through the S2 vertebral body 

3. Very slight cortical deformity through the mid left iliac wing, questionable 

nondisplaced fracture. 

4. Small degree of traumatic presacral hemorrhage.

5. Other chronic findings, as above. 



Electronically signed by: Ernie Levine On 09/03/2020  16:13:12 PM

## 2020-09-03 NOTE — IPNPDOC
Subjective


Date Seen


The patient was seen on 9/3/20.





Subjective


Chief Complaint/HPI


pt complaining that she is "on the wrong side of the bed", offers no other 

complaints


General:  Denies: ROS Unobtainable, Chills, Night Sweats, Fatigue, Malaise, 

Normal Appetite, Other Symptoms


Constitutional:  Denies: Chills, Fever, Malaise, Night Sweats, Weakness, 

Fatigue, Weight Loss, Lethargy, Other


Skin:  Denies: Rash, Lesions, Jaundice, Bruising, Itching, Dry, Breakdown, Nail 

Changes, Other


Pulmonary:  Denies: Dyspnea, Cough, Pleuritic Chest Pain, Other Symptoms


Cardiovascular:  Denies: Chest Pain, Palpitations, Orthopnea, Paroxysmal Noc. 

Dyspnea, Edema, Lt Headedness, Other Symptoms


Gastrointestinal:  Denies: Nausea, Vomiting, Abdominal Pain, Diarrhea, 

Constipation, Melena, Hematochezia, Other Symptoms


Musculoskeletal:  Denies: Neck Pain, Back Pain, Shoulder Pain, Arm Pain, Hand Pa

in, Leg Pain, Foot Pain, Joint Pain, Muscle Pain, Spasms, Other Symptoms


Neurological:  Denies: Weakness, Numbness, Incoordination, Change in speech, 

Confusion, Seizures, Other Symptoms


Psych:  Denies: Mood Normal, Anxiety, Depression, Memory Issues, Thoughts of 

Self Harm, Anger, Thoughts of Harming Other, Other Psych





Objective


Physical Examination


Chest Exam:  Positive: Clear to auscultation, Normal air movement


Heart Exam:  Positive: Normal S1, Normal S2


Abdomen Exam:  Positive: Normal bowel sounds


Extremity Exam:  Positive: Normal pulses


Skin Exam:  Positive: Nl turgor and temperature





Assessment /Plan


Problems





(1) Multiple sclerosis exacerbation


Status:  Acute


Problem Text:  Pt admitted to med floor last night


Solumedrol 1 gm daily for 3 days as per Dr Guerra's recommendations (day#2 today)


MRI Brain:


Stable size and appearance of prominent T2/FLAIR hyperintense white matter 


lesions along the periventricular white matter and corpus callosum, compatible 


with provided history of multiple sclerosis. No acute findings. 


PT eval requested today


DC plan pending PT recs:


 





(2) ANTONIETA (acute kidney injury)


Status:  Acute


Problem Text:  ANTONIETA on CKD-III


Cr today is 1.67, which lower then admission


Continue IVF, repeat labs in am


 








(3) UTI (urinary tract infection)


Status:  Acute


Problem Text:  The UA is positive, but didnt have abd pain or urinary symptoms 

prior to the fall.


The only SIRS criteria she has is leukocytosis


Continue levofloxacin pending UCx and blood cx 





(4) Fall


Status:  Acute


Problem Text:  Hx of back pain 2/2 Sacral Fractures / Osteoporosis


By definition she has osteoporosis bc of the vertebral fx;


Flector patch + K pad & Norco PRN pain /  f/u CT of lumbar spine in AM, pending 

results the day time team consult  Ortho consult /   PT consult / check TSH, and

Vitamin D/ f/u w PCP for DEXA scan based on her GFR she will likely be a 

candidate for Denosumab which can be started by her PCP / in the meanwhile will 

start Ca w Vitamin D supplements


 





(5) Confusion


Status:  Acute


Problem Text:  mild confusion


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


pt neurologically , she is stable, she alert ,awake and orientedX3


monitor clinically








Plan/VTE


VTE Prophylaxis Ordered?:  Yes





VS, I&O, 24H, Fishbone


Vital Signs/I&O





Vital Signs








  Date Time  Temp Pulse Resp B/P (MAP) Pulse Ox O2 Delivery O2 Flow Rate FiO2


 


9/3/20 10:10  66  117/74    


 


9/3/20 06:00 98.1  16  97 Room Air  














I&O- Last 24 Hours up to 6 AM 


 


 9/3/20





 06:00


 


Intake Total 3655 ml


 


Output Total 800 ml


 


Balance 2855 ml











Laboratory Data


24H LABS


Laboratory Tests 2


9/3/20 05:51: 


Immature Granulocyte % (Auto) 0.7, Neutrophils (%) (Auto) 90.2H, Lymphocytes (%)

(Auto) 7.3L, Monocytes (%) (Auto) 1.7, Eosinophils (%) (Auto) 0.0, Basophils (%)

(Auto) 0.1, Neutrophils # (Auto) 13.1H, Lymphocytes # (Auto) 1.1L, Monocytes # 

(Auto) 0.3, Eosinophils # (Auto) 0.0, Basophils # (Auto) 0.0, Nucleated Red 

Blood Cells % (auto) 0.0, Anion Gap 9, Glomerular Filtration Rate 32.8L, Calcium

Level 8.9, Magnesium Level 2.3, Total Bilirubin 0.5, Aspartate Amino Transf 

(AST/SGOT) 39H, Alanine Aminotransferase (ALT/SGPT) 30, Alkaline Phosphatase 88,

Total Protein 6.1L, Albumin 2.7L, Albumin/Globulin Ratio 0.8L


CBC/BMP


Laboratory Tests


9/3/20 05:51








Microbiology





Microbiology


9/1/20 Urine Culture - Final, Complete


         Escherichia Coli


9/1/20 Blood Culture - Preliminary, Resulted


         No growth after 24 hours . All specim...


9/1/20 Blood Culture - Preliminary, Resulted











QUETA HORN MD               Sep 3, 2020 11:24

## 2020-09-04 VITALS — DIASTOLIC BLOOD PRESSURE: 94 MMHG | SYSTOLIC BLOOD PRESSURE: 127 MMHG

## 2020-09-04 VITALS — DIASTOLIC BLOOD PRESSURE: 90 MMHG | SYSTOLIC BLOOD PRESSURE: 150 MMHG

## 2020-09-04 VITALS — DIASTOLIC BLOOD PRESSURE: 85 MMHG | SYSTOLIC BLOOD PRESSURE: 140 MMHG

## 2020-09-04 VITALS — SYSTOLIC BLOOD PRESSURE: 123 MMHG | DIASTOLIC BLOOD PRESSURE: 58 MMHG

## 2020-09-04 LAB
ALBUMIN SERPL BCG-MCNC: 2.3 GM/DL (ref 3.2–5.2)
ALT SERPL W P-5'-P-CCNC: 31 U/L (ref 12–78)
BASOPHILS # BLD AUTO: 0 10^3/UL (ref 0–0.2)
BASOPHILS NFR BLD AUTO: 0.1 % (ref 0–1)
BILIRUB SERPL-MCNC: 0.4 MG/DL (ref 0.2–1)
BUN SERPL-MCNC: 39 MG/DL (ref 7–18)
CALCIUM SERPL-MCNC: 9 MG/DL (ref 8.8–10.2)
CHLORIDE SERPL-SCNC: 114 MEQ/L (ref 98–107)
CK MB CFR.DF SERPL CALC: 7.61
CK MB CFR.DF SERPL CALC: 8.21
CK MB SERPL-MCNC: 5.1 NG/ML (ref ?–3.6)
CK MB SERPL-MCNC: 5.5 NG/ML (ref ?–3.6)
CK SERPL-CCNC: 67 U/L (ref 26–192)
CK SERPL-CCNC: 67 U/L (ref 26–192)
CO2 SERPL-SCNC: 22 MEQ/L (ref 21–32)
CREAT SERPL-MCNC: 1.59 MG/DL (ref 0.55–1.3)
EOSINOPHIL # BLD AUTO: 0 10^3/UL (ref 0–0.5)
EOSINOPHIL NFR BLD AUTO: 0 % (ref 0–3)
GFR SERPL CREATININE-BSD FRML MDRD: 33.8 ML/MIN/{1.73_M2} (ref 39–?)
GLUCOSE SERPL-MCNC: 121 MG/DL (ref 70–100)
HCT VFR BLD AUTO: 31.2 % (ref 36–47)
HGB BLD-MCNC: 10.4 G/DL (ref 12–15.5)
LYMPHOCYTES # BLD AUTO: 1 10^3/UL (ref 1.5–5)
LYMPHOCYTES NFR BLD AUTO: 9 % (ref 24–44)
MAGNESIUM SERPL-MCNC: 2.2 MG/DL (ref 1.8–2.4)
MCH RBC QN AUTO: 32.2 PG (ref 27–33)
MCHC RBC AUTO-ENTMCNC: 33.3 G/DL (ref 32–36.5)
MCV RBC AUTO: 96.6 FL (ref 80–96)
MONOCYTES # BLD AUTO: 0.3 10^3/UL (ref 0–0.8)
MONOCYTES NFR BLD AUTO: 2.5 % (ref 0–5)
NEUTROPHILS # BLD AUTO: 10.1 10^3/UL (ref 1.5–8.5)
NEUTROPHILS NFR BLD AUTO: 87.5 % (ref 36–66)
PLATELET # BLD AUTO: 182 10^3/UL (ref 150–450)
POTASSIUM SERPL-SCNC: 4.4 MEQ/L (ref 3.5–5.1)
PROT SERPL-MCNC: 6 GM/DL (ref 6.4–8.2)
RBC # BLD AUTO: 3.23 10^6/UL (ref 4–5.4)
SODIUM SERPL-SCNC: 144 MEQ/L (ref 136–145)
TROPONIN I SERPL-MCNC: 0.02 NG/ML (ref ?–0.1)
TROPONIN I SERPL-MCNC: < 0.02 NG/ML (ref ?–0.1)
WBC # BLD AUTO: 11.6 10^3/UL (ref 4–10)

## 2020-09-04 RX ADMIN — DICLOFENAC EPOLAMINE SCH PATCH: 0.01 SYSTEM TOPICAL at 08:49

## 2020-09-04 RX ADMIN — HYDRALAZINE HYDROCHLORIDE SCH MG: 50 TABLET, FILM COATED ORAL at 20:16

## 2020-09-04 RX ADMIN — NEBIVOLOL HYDROCHLORIDE SCH MG: 5 TABLET ORAL at 08:48

## 2020-09-04 RX ADMIN — HYDROCODONE BITARTRATE AND ACETAMINOPHEN PRN TAB: 5; 325 TABLET ORAL at 00:56

## 2020-09-04 RX ADMIN — ENOXAPARIN SODIUM SCH MG: 40 INJECTION SUBCUTANEOUS at 08:48

## 2020-09-04 RX ADMIN — SODIUM CHLORIDE SCH MLS/HR: 9 INJECTION, SOLUTION INTRAVENOUS at 12:16

## 2020-09-04 RX ADMIN — SODIUM CHLORIDE SCH MLS/HR: 9 INJECTION, SOLUTION INTRAVENOUS at 12:22

## 2020-09-04 RX ADMIN — HYDRALAZINE HYDROCHLORIDE SCH MG: 50 TABLET, FILM COATED ORAL at 08:48

## 2020-09-04 RX ADMIN — LOSARTAN POTASSIUM SCH MG: 50 TABLET, FILM COATED ORAL at 08:49

## 2020-09-04 RX ADMIN — Medication SCH MG: at 20:15

## 2020-09-04 RX ADMIN — HYDROCODONE BITARTRATE AND ACETAMINOPHEN PRN TAB: 5; 325 TABLET ORAL at 09:32

## 2020-09-04 RX ADMIN — Medication SCH MG: at 08:48

## 2020-09-04 RX ADMIN — DICLOFENAC EPOLAMINE SCH PATCH: 0.01 SYSTEM TOPICAL at 20:14

## 2020-09-04 RX ADMIN — PROBIOTIC PRODUCT - TAB SCH EA: TAB at 08:48

## 2020-09-04 RX ADMIN — SODIUM CHLORIDE SCH MLS/HR: 9 INJECTION, SOLUTION INTRAVENOUS at 06:39

## 2020-09-04 RX ADMIN — BACLOFEN SCH MG: 10 TABLET ORAL at 20:15

## 2020-09-04 RX ADMIN — ASPIRIN SCH MG: 81 TABLET ORAL at 20:15

## 2020-09-04 RX ADMIN — MULTIPLE VITAMINS W/ MINERALS TAB SCH TAB: TAB at 20:15

## 2020-09-04 RX ADMIN — SODIUM CHLORIDE SCH MLS/HR: 9 INJECTION, SOLUTION INTRAVENOUS at 20:16

## 2020-09-04 NOTE — CR
DATE OF CONSULTATION:  09/03/2020



CHIEF COMPLAINT:  Lower back pain.



HISTORY OF PRESENT ILLNESS:  This is a pleasant 75-year-old female who has 
chronic right-sided weakness secondary to multiple sclerosis (MS), who fell 
backward as she was walking up a step. She is able to walk with a walker at 
baseline. She had a fair amount of lower back and tailbone pain and presented to
the emergency room (ER).



Patient's review of systems is reviewed and are negative except for history of 
present illness (HPI).



MEDICAL HISTORY:  

1. Multiple sclerosis with right-sided weakness.

2. Raynaud's disease.

3. Chronic kidney disease, stage III.

4. Hypertension. 

5. Congestive heart failure (CHF).

6. Coronary artery disease with a history of myocardial infarction (MI).



SURGICAL HISTORY:  

1. Tonsillectomy.

2. Tubal ligation.

3. Right-sided carotid endarterectomy (CEA). 



SOCIAL HISTORY: Patient lives with her spouse. She is retired. She does not 
smoke or drink.



HOME MEDICATIONS:  Aspirin, baclofen, cranberry, hydralazine, Avonex, 
Lactobacillus, losartan, multivitamin, Bystolic, vitamin CoQ10.



ALLERGIES:  Metals.



PHYSICAL EXAMINATION: 

GENERAL: Well appearing, alert and oriented in no acute distress.

CARDIOVASCULAR: Regular rate and rhythm. 

LUNGS: Clear to auscultation.

ABDOMEN: Soft and nontender.

MUSCULOSKELETAL: Patient has tenderness along the lumbar spine going into the 
sacrum. She has 5/5 strength in the left upper extremity and iliopsoas, 
quadriceps, tibialis anterior, and extensor hallucis longus (EHL). On the right 
side she is slightly weaker but is able to fire all muscles. This is baseline 
for her. I would rate her strength as 4/5 in the right lower extremity. She 
states she has some chronic numbness, but overall sensation is intact to light 
touch in the L3-S1 distribution.



IMAGING:

CT scan is reviewed. This is of the pelvis. There is an acute, nondisplaced 
fracture of the right sacral ala and an acute nondisplaced fracture through the 
S2 vertebral body. There is possible deformity through the left iliac wing.



IMPRESSION: 

Right sacral fracture.



PLAN: Fractures do appear to be nondisplaced, and she is at her baseline 
neurologic status. I would suggest pain control and protected weightbearing with
continued use of her walker. If she has any worsening of her neurologic status 
on the right side or increasing pain, repeat imaging should be performed right 
away with an MRI scan. Otherwise, if her symptoms are stable and improving, she 
could be seen in the orthopedic clinic in 1 week for pelvic x-rays. Overall, 
limit activity but ok for protected weightbearing with a walker for short 
distances.  Please notify orthopedics if worsening symptoms.

LEN

## 2020-09-04 NOTE — IPNPDOC
Text Note


Date of Service


The patient was seen on 9/4/20.





NOTE


Subjective: Pt c/o back pain, radiated to the left leg, pain is not increased in

intensity compare to yesterday. No urinary or fecal incontinence. No fever, no 

chills





Physical Examination


Chest Exam:  S1S2, clear to auscultation, Normal air movement


Heart Exam:  Positive: Normal S1, Normal S2


Abdomen Exam:  Normal bowel sounds, Soft and nontender


Extremity Exam:  Normal pulses


Skin Exam: Nl turgor and temperature


MS: tenderness along the lumbar spine going into the sacrum. She has 5/5 

strength in the left upper extremity, strength as 4/5 in the right lower 

extremity. 


Extr: No swelling, no cyanosis


Neuro: no nuchal rigidity, follows commands








A/P


75-year-old female who has chronic right-sided weakness secondary to multiple 

sclerosis (MS), who fell backward as she was walking up a step. CT scan showed  

acute, nondisplaced fracture of the right sacral ala and an acute nondisplaced 

fracture through the S2 vertebral body. There is possible deformity through the 

left iliac wing.





Multiple sclerosis exacerbation


Solumedrol 1 gm daily for 3 days as per Dr Guerra's recommendations (day#3 today)


MRI Brain: Stable size and appearance of prominent T2/FLAIR hyperintense white 

matter lesions along the periventricular white matter and corpus callosum, 

compatible  with provided history of multiple sclerosis. No acute findings.  


PT/OT


f/u with neuro in the outpt settings








ANTONIETA (acute kidney injury)


ANTONIETA on CKD-III


Improved


Continue IVF


 





UTI (urinary tract infection)


BCx positive for E.coli


Continue levofloxacin 


Await urine Cx





Fall


Hx of back pain 2/2 Sacral Fractures / Osteoporosis


By definition she has osteoporosis bc of the vertebral fx


Ortho team rec pain control and weightbearing as tolerated with continued use of

her walker. 


she will likely be a candidate for Denosumab which can be started by her PCP / 

in the meanwhile will start Ca w Vitamin D supplements


PT/OT 





Confusion


Resolved


she alert ,awake and orientedX3


monitor clinically





VS,Fishbone, I+O


VS, Fishbone, I+O


Laboratory Tests


9/4/20 05:26











Vital Signs








  Date Time  Temp Pulse Resp B/P (MAP) Pulse Ox O2 Delivery O2 Flow Rate FiO2


 


9/4/20 14:00 97.8 66 17 123/58 (79) 96 Room Air  














I&O- Last 24 Hours up to 6 AM 


 


 9/4/20





 06:00


 


Intake Total 3658 ml


 


Output Total 100 ml


 


Balance 3558 ml

















ISATU HAMLIN DO             Sep 4, 2020 15:16

## 2020-09-05 VITALS — SYSTOLIC BLOOD PRESSURE: 141 MMHG | DIASTOLIC BLOOD PRESSURE: 75 MMHG

## 2020-09-05 VITALS — DIASTOLIC BLOOD PRESSURE: 80 MMHG | SYSTOLIC BLOOD PRESSURE: 162 MMHG

## 2020-09-05 VITALS — DIASTOLIC BLOOD PRESSURE: 85 MMHG | SYSTOLIC BLOOD PRESSURE: 136 MMHG

## 2020-09-05 LAB
BUN SERPL-MCNC: 43 MG/DL (ref 7–18)
CALCIUM SERPL-MCNC: 8.7 MG/DL (ref 8.8–10.2)
CHLORIDE SERPL-SCNC: 116 MEQ/L (ref 98–107)
CO2 SERPL-SCNC: 22 MEQ/L (ref 21–32)
CREAT SERPL-MCNC: 1.43 MG/DL (ref 0.55–1.3)
GFR SERPL CREATININE-BSD FRML MDRD: 38.2 ML/MIN/{1.73_M2} (ref 39–?)
GLUCOSE SERPL-MCNC: 97 MG/DL (ref 70–100)
HCT VFR BLD AUTO: 31.7 % (ref 36–47)
HGB BLD-MCNC: 10.4 G/DL (ref 12–15.5)
MAGNESIUM SERPL-MCNC: 2.3 MG/DL (ref 1.8–2.4)
MCH RBC QN AUTO: 32.3 PG (ref 27–33)
MCHC RBC AUTO-ENTMCNC: 32.8 G/DL (ref 32–36.5)
MCV RBC AUTO: 98.4 FL (ref 80–96)
PLATELET # BLD AUTO: 180 10^3/UL (ref 150–450)
POTASSIUM SERPL-SCNC: 4.3 MEQ/L (ref 3.5–5.1)
RBC # BLD AUTO: 3.22 10^6/UL (ref 4–5.4)
SODIUM SERPL-SCNC: 143 MEQ/L (ref 136–145)
WBC # BLD AUTO: 8.9 10^3/UL (ref 4–10)

## 2020-09-05 RX ADMIN — HYDROCODONE BITARTRATE AND ACETAMINOPHEN PRN TAB: 5; 325 TABLET ORAL at 11:18

## 2020-09-05 RX ADMIN — DICLOFENAC EPOLAMINE SCH PATCH: 0.01 SYSTEM TOPICAL at 20:35

## 2020-09-05 RX ADMIN — NEBIVOLOL HYDROCHLORIDE SCH MG: 5 TABLET ORAL at 09:39

## 2020-09-05 RX ADMIN — SODIUM CHLORIDE SCH MLS/HR: 9 INJECTION, SOLUTION INTRAVENOUS at 11:22

## 2020-09-05 RX ADMIN — ENOXAPARIN SODIUM SCH MG: 40 INJECTION SUBCUTANEOUS at 09:39

## 2020-09-05 RX ADMIN — LEVOFLOXACIN SCH MLS/HR: 5 INJECTION, SOLUTION INTRAVENOUS at 20:22

## 2020-09-05 RX ADMIN — PROBIOTIC PRODUCT - TAB SCH EA: TAB at 09:40

## 2020-09-05 RX ADMIN — MULTIPLE VITAMINS W/ MINERALS TAB SCH TAB: TAB at 20:22

## 2020-09-05 RX ADMIN — HYDRALAZINE HYDROCHLORIDE SCH MG: 50 TABLET, FILM COATED ORAL at 20:25

## 2020-09-05 RX ADMIN — HYDRALAZINE HYDROCHLORIDE SCH MG: 50 TABLET, FILM COATED ORAL at 09:39

## 2020-09-05 RX ADMIN — Medication SCH MG: at 09:40

## 2020-09-05 RX ADMIN — ASPIRIN SCH MG: 81 TABLET ORAL at 20:22

## 2020-09-05 RX ADMIN — DICLOFENAC EPOLAMINE SCH PATCH: 0.01 SYSTEM TOPICAL at 09:40

## 2020-09-05 RX ADMIN — BACLOFEN SCH MG: 10 TABLET ORAL at 20:22

## 2020-09-05 RX ADMIN — Medication SCH MG: at 20:22

## 2020-09-05 RX ADMIN — SODIUM CHLORIDE SCH MLS/HR: 9 INJECTION, SOLUTION INTRAVENOUS at 14:14

## 2020-09-05 RX ADMIN — LOSARTAN POTASSIUM SCH MG: 50 TABLET, FILM COATED ORAL at 09:40

## 2020-09-05 RX ADMIN — SODIUM CHLORIDE SCH MLS/HR: 9 INJECTION, SOLUTION INTRAVENOUS at 05:53

## 2020-09-05 RX ADMIN — SODIUM CHLORIDE SCH MLS/HR: 9 INJECTION, SOLUTION INTRAVENOUS at 23:34

## 2020-09-05 NOTE — IPNPDOC
Text Note


Date of Service


The patient was seen on 9/5/20.





NOTE


Subjective: No any acute events overnight. No urinary or fecal incontinence. No 

fever, no chills





Physical Examination


Chest Exam:  S1S2, clear to auscultation, Normal air movement


Heart Exam:  Positive: Normal S1, Normal S2


Abdomen Exam:  Normal bowel sounds, Soft and nontender


Extremity Exam:  Normal pulses


Skin Exam: Nl turgor and temperature


MS: tenderness along the lumbar spine going into the sacrum. She has 5/5 

strength in the left upper extremity, strength as 4/5 in the right lower 

extremity. 


Extr: No swelling, no cyanosis


Neuro: no nuchal rigidity, follows commands








A/P


75-year-old female who has chronic right-sided weakness secondary to multiple 

sclerosis (MS), who fell backward as she was walking up a step. CT scan showed  

acute, nondisplaced fracture of the right sacral ala and an acute nondisplaced 

fracture through the S2 vertebral body. There is possible deformity through the 

left iliac wing.





Multiple sclerosis exacerbation


completed course of Solu Medrol


MRI Brain: Stable size and appearance of prominent T2/FLAIR hyperintense white 

matter lesions along the periventricular white matter and corpus callosum, 

compatible  with provided history of multiple sclerosis. No acute findings.  


PT/OT


f/u with neuro in the outpt settings








ANTONIETA (acute kidney injury)


ANTONIETA on CKD-III


Improved


Continue IVF


 





UTI (urinary tract infection)


BCx positive for E.coli


Continue levofloxacin 








Fall


Hx of back pain 2/2 Sacral Fractures / Osteoporosis


By definition she has osteoporosis bc of the vertebral fx


Ortho team rec pain control and weightbearing as tolerated with continued use of

her walker. 


she will likely be a candidate for Denosumab which can be started by her PCP / 

in the meanwhile will start Ca w Vitamin D supplements


PT/OT 





Confusion


Resolved


she alert ,awake and orientedX3


monitor clinically








Constipation


Miralax





VS,Fishbone, I+O


VS, Fishbone, I+O


Laboratory Tests


9/5/20 06:25











Vital Signs








  Date Time  Temp Pulse Resp B/P (MAP) Pulse Ox O2 Delivery O2 Flow Rate FiO2


 


9/5/20 11:48   19     


 


9/5/20 09:40    159/85    


 


9/5/20 09:39  67      


 


9/5/20 04:00 98.6    92 Room Air  














I&O- Last 24 Hours up to 6 AM 


 


 9/5/20





 05:59


 


Intake Total 2536 ml


 


Output Total 0 ml


 


Balance 2536 ml

















ISATU HAMLIN DO             Sep 5, 2020 13:43

## 2020-09-06 VITALS — SYSTOLIC BLOOD PRESSURE: 175 MMHG | DIASTOLIC BLOOD PRESSURE: 88 MMHG

## 2020-09-06 VITALS — SYSTOLIC BLOOD PRESSURE: 174 MMHG | DIASTOLIC BLOOD PRESSURE: 82 MMHG

## 2020-09-06 VITALS — DIASTOLIC BLOOD PRESSURE: 75 MMHG | SYSTOLIC BLOOD PRESSURE: 135 MMHG

## 2020-09-06 VITALS — SYSTOLIC BLOOD PRESSURE: 138 MMHG | DIASTOLIC BLOOD PRESSURE: 65 MMHG

## 2020-09-06 LAB
BUN SERPL-MCNC: 44 MG/DL (ref 7–18)
CALCIUM SERPL-MCNC: 8.8 MG/DL (ref 8.8–10.2)
CHLORIDE SERPL-SCNC: 118 MEQ/L (ref 98–107)
CO2 SERPL-SCNC: 23 MEQ/L (ref 21–32)
CREAT SERPL-MCNC: 1.31 MG/DL (ref 0.55–1.3)
GFR SERPL CREATININE-BSD FRML MDRD: 42.3 ML/MIN/{1.73_M2} (ref 39–?)
GLUCOSE SERPL-MCNC: 106 MG/DL (ref 70–100)
HCT VFR BLD AUTO: 30.2 % (ref 36–47)
HGB BLD-MCNC: 10.1 G/DL (ref 12–15.5)
MAGNESIUM SERPL-MCNC: 2.5 MG/DL (ref 1.8–2.4)
MCH RBC QN AUTO: 32.4 PG (ref 27–33)
MCHC RBC AUTO-ENTMCNC: 33.4 G/DL (ref 32–36.5)
MCV RBC AUTO: 96.8 FL (ref 80–96)
PLATELET # BLD AUTO: 173 10^3/UL (ref 150–450)
POTASSIUM SERPL-SCNC: 4.2 MEQ/L (ref 3.5–5.1)
RBC # BLD AUTO: 3.12 10^6/UL (ref 4–5.4)
SODIUM SERPL-SCNC: 145 MEQ/L (ref 136–145)
WBC # BLD AUTO: 7.8 10^3/UL (ref 4–10)

## 2020-09-06 RX ADMIN — PROBIOTIC PRODUCT - TAB SCH EA: TAB at 09:40

## 2020-09-06 RX ADMIN — DICLOFENAC EPOLAMINE SCH PATCH: 0.01 SYSTEM TOPICAL at 09:40

## 2020-09-06 RX ADMIN — LOSARTAN POTASSIUM SCH MG: 50 TABLET, FILM COATED ORAL at 09:41

## 2020-09-06 RX ADMIN — NEBIVOLOL HYDROCHLORIDE SCH MG: 5 TABLET ORAL at 09:41

## 2020-09-06 RX ADMIN — HYDRALAZINE HYDROCHLORIDE SCH MG: 50 TABLET, FILM COATED ORAL at 15:25

## 2020-09-06 RX ADMIN — SODIUM CHLORIDE SCH MLS/HR: 9 INJECTION, SOLUTION INTRAVENOUS at 05:56

## 2020-09-06 RX ADMIN — HYDRALAZINE HYDROCHLORIDE SCH MG: 50 TABLET, FILM COATED ORAL at 09:41

## 2020-09-06 RX ADMIN — Medication SCH MG: at 09:40

## 2020-09-06 RX ADMIN — DICLOFENAC EPOLAMINE SCH PATCH: 0.01 SYSTEM TOPICAL at 20:05

## 2020-09-06 RX ADMIN — ENOXAPARIN SODIUM SCH MG: 40 INJECTION SUBCUTANEOUS at 09:40

## 2020-09-06 RX ADMIN — ASPIRIN SCH MG: 81 TABLET ORAL at 20:04

## 2020-09-06 RX ADMIN — MAGNESIUM HYDROXIDE PRN ML: 400 SUSPENSION ORAL at 09:48

## 2020-09-06 RX ADMIN — Medication SCH MG: at 20:04

## 2020-09-06 RX ADMIN — MULTIPLE VITAMINS W/ MINERALS TAB SCH TAB: TAB at 20:04

## 2020-09-06 RX ADMIN — HYDRALAZINE HYDROCHLORIDE SCH MG: 50 TABLET, FILM COATED ORAL at 20:04

## 2020-09-06 RX ADMIN — BACLOFEN SCH MG: 10 TABLET ORAL at 20:04

## 2020-09-06 NOTE — IPNPDOC
Text Note


Date of Service


The patient was seen on 9/6/20.





NOTE


Subjective: No any acute events overnight. No fever, no chills





Physical Examination


Chest Exam:  S1S2, clear to auscultation, Normal air movement


Heart Exam:  Positive: Normal S1, Normal S2


Abdomen Exam:  Normal bowel sounds, Soft and nontender


Extremity Exam:  Normal pulses


Skin Exam: Nl turgor and temperature


MS: tenderness along the lumbar spine going into the sacrum. She has 5/5 

strength in the left upper extremity, strength as 4/5 in the right lower 

extremity. 


Extr: No swelling, no cyanosis


Neuro: no nuchal rigidity, follows commands








A/P


75-year-old female who has chronic right-sided weakness secondary to multiple 

sclerosis (MS), who fell backward as she was walking up a step. CT scan showed  

acute, nondisplaced fracture of the right sacral ala and an acute nondisplaced 

fracture through the S2 vertebral body. There is possible deformity through the 

left iliac wing.





Multiple sclerosis exacerbation


completed course of Solu Medrol


MRI Brain: Stable size and appearance of prominent T2/FLAIR hyperintense white 

matter lesions along the periventricular white matter and corpus callosum, 

compatible  with provided history of multiple sclerosis. No acute findings.  


PT/OT


f/u with neuro in the outpt settings








ANTONIETA (acute kidney injury)


ANTONIETA on CKD-III


Improved


encourage oral intake


 





UTI (urinary tract infection)


UA positive for E.coli


completed course of levofloxacin.








Fall


Hx of back pain 2/2 Sacral Fractures / Osteoporosis


By definition she has osteoporosis bc of the vertebral fx


Ortho team rec pain control and weightbearing as tolerated with continued use of

her walker. 


she will likely be a candidate for Denosumab which can be started by her PCP / 

in the meanwhile will start Ca w Vitamin D supplements


PT/OT 





Confusion


Resolved


she alert,awake and orientedX3


monitor clinically








Constipation


Miralax





HTN


added Norvasc


increased hydralazine to TID





VS,Fishbone, I+O


VS, Fishbone, I+O


Laboratory Tests


9/6/20 07:09











Vital Signs








  Date Time  Temp Pulse Resp B/P (MAP) Pulse Ox O2 Delivery O2 Flow Rate FiO2


 


9/6/20 15:06    174/82 (112)    


 


9/6/20 14:00 97.8 68 18  93 Room Air  














I&O- Last 24 Hours up to 6 AM 


 


 9/6/20





 06:00


 


Intake Total 1885 ml


 


Output Total 0 ml


 


Balance 1885 ml

















ISATU HAMLIN DO             Sep 6, 2020 15:14

## 2020-09-07 VITALS — DIASTOLIC BLOOD PRESSURE: 70 MMHG | SYSTOLIC BLOOD PRESSURE: 157 MMHG

## 2020-09-07 VITALS — SYSTOLIC BLOOD PRESSURE: 156 MMHG | DIASTOLIC BLOOD PRESSURE: 73 MMHG

## 2020-09-07 VITALS — SYSTOLIC BLOOD PRESSURE: 158 MMHG | DIASTOLIC BLOOD PRESSURE: 71 MMHG

## 2020-09-07 VITALS — SYSTOLIC BLOOD PRESSURE: 148 MMHG | DIASTOLIC BLOOD PRESSURE: 70 MMHG

## 2020-09-07 LAB
BUN SERPL-MCNC: 41 MG/DL (ref 7–18)
CALCIUM SERPL-MCNC: 8.6 MG/DL (ref 8.8–10.2)
CHLORIDE SERPL-SCNC: 115 MEQ/L (ref 98–107)
CO2 SERPL-SCNC: 24 MEQ/L (ref 21–32)
CREAT SERPL-MCNC: 1.44 MG/DL (ref 0.55–1.3)
GFR SERPL CREATININE-BSD FRML MDRD: 37.9 ML/MIN/{1.73_M2} (ref 39–?)
GLUCOSE SERPL-MCNC: 94 MG/DL (ref 70–100)
HCT VFR BLD AUTO: 35.9 % (ref 36–47)
HGB BLD-MCNC: 12.2 G/DL (ref 12–15.5)
MAGNESIUM SERPL-MCNC: 3 MG/DL (ref 1.8–2.4)
MCH RBC QN AUTO: 32.3 PG (ref 27–33)
MCHC RBC AUTO-ENTMCNC: 34 G/DL (ref 32–36.5)
MCV RBC AUTO: 95 FL (ref 80–96)
PLATELET # BLD AUTO: 241 10^3/UL (ref 150–450)
POTASSIUM SERPL-SCNC: 3.8 MEQ/L (ref 3.5–5.1)
RBC # BLD AUTO: 3.78 10^6/UL (ref 4–5.4)
SODIUM SERPL-SCNC: 145 MEQ/L (ref 136–145)
WBC # BLD AUTO: 18.4 10^3/UL (ref 4–10)

## 2020-09-07 RX ADMIN — ENOXAPARIN SODIUM SCH MG: 40 INJECTION SUBCUTANEOUS at 09:52

## 2020-09-07 RX ADMIN — BACLOFEN SCH MG: 10 TABLET ORAL at 21:21

## 2020-09-07 RX ADMIN — HYDRALAZINE HYDROCHLORIDE SCH MG: 50 TABLET, FILM COATED ORAL at 16:52

## 2020-09-07 RX ADMIN — PROBIOTIC PRODUCT - TAB SCH EA: TAB at 09:49

## 2020-09-07 RX ADMIN — ACETAMINOPHEN PRN MG: 325 TABLET ORAL at 09:46

## 2020-09-07 RX ADMIN — DICLOFENAC EPOLAMINE SCH PATCH: 0.01 SYSTEM TOPICAL at 21:22

## 2020-09-07 RX ADMIN — HYDRALAZINE HYDROCHLORIDE SCH MG: 50 TABLET, FILM COATED ORAL at 21:21

## 2020-09-07 RX ADMIN — DICLOFENAC EPOLAMINE SCH PATCH: 0.01 SYSTEM TOPICAL at 09:51

## 2020-09-07 RX ADMIN — Medication SCH MG: at 09:48

## 2020-09-07 RX ADMIN — MULTIPLE VITAMINS W/ MINERALS TAB SCH TAB: TAB at 21:20

## 2020-09-07 RX ADMIN — LOSARTAN POTASSIUM SCH MG: 50 TABLET, FILM COATED ORAL at 09:48

## 2020-09-07 RX ADMIN — ASPIRIN SCH MG: 81 TABLET ORAL at 21:22

## 2020-09-07 RX ADMIN — HYDRALAZINE HYDROCHLORIDE SCH MG: 50 TABLET, FILM COATED ORAL at 09:49

## 2020-09-07 RX ADMIN — NEBIVOLOL HYDROCHLORIDE SCH MG: 5 TABLET ORAL at 09:49

## 2020-09-07 RX ADMIN — Medication SCH MG: at 21:22

## 2020-09-07 NOTE — IPNPDOC
Text Note


Date of Service


The patient was seen on 9/7/20.





NOTE


Subjective: No any acute events overnight. No fever, no chills. Constipation r

esolved





Physical Examination


Chest Exam:  S1S2, clear to auscultation, Normal air movement


Heart Exam:  Positive: Normal S1, Normal S2


Abdomen Exam:  Normal bowel sounds, Soft and nontender


Extremity Exam:  Normal pulses


Skin Exam: Nl turgor and temperature


MS: tenderness along the lumbar spine going into the sacrum. She has 5/5 

strength in the left upper extremity, strength as 4/5 in the right lower 

extremity. 


Extr: No swelling, no cyanosis


Neuro: no nuchal rigidity, follows commands








A/P


75-year-old female who has chronic right-sided weakness secondary to multiple 

sclerosis (MS), who fell backward as she was walking up a step. CT scan showed  

acute, nondisplaced fracture of the right sacral ala and an acute nondisplaced 

fracture through the S2 vertebral body. There is possible deformity through the 

left iliac wing.





Multiple sclerosis exacerbation


completed course of Solu Medrol. 


MRI Brain: Stable size and appearance of prominent T2/FLAIR hyperintense white 

matter lesions along the periventricular white matter and corpus callosum, 

compatible  with provided history of multiple sclerosis. No acute findings.  


PT/OT


f/u with neuro in the outpt settings








ANTONIETA (acute kidney injury)


ANTONIETA on CKD-III


Improved


encourage oral intake


 





UTI (urinary tract infection)


UA positive for E.coli


completed course of levofloxacin.








Fall


Hx of back pain 2/2 Sacral Fractures / Osteoporosis


By definition she has osteoporosis bc of the vertebral fx


Ortho team rec pain control and weightbearing as tolerated with continued use of

her walker. 


she will likely be a candidate for Denosumab which can be started by her PCP / 

in the meanwhile will start Ca w Vitamin D supplements


PT/OT 





Confusion


Resolved


she alert,awake and orientedX3


monitor clinically








Constipation


Resolved








HTN


BP under control


added Norvasc


increased hydralazine to TID





Leucocytosis


Most likely due to IV steroids for MS flare


Pt afebrile, denies chills. BP stable





VS,Fishbone, I+O


VS, Fishbone, I+O


Laboratory Tests


9/7/20 06:32











Vital Signs








  Date Time  Temp Pulse Resp B/P (MAP) Pulse Ox O2 Delivery O2 Flow Rate FiO2


 


9/7/20 09:48    151/69    


 


9/7/20 08:15 96.8 67 16  96 Room Air  














I&O- Last 24 Hours up to 6 AM 


 


 9/7/20





 05:59


 


Intake Total 1310 ml


 


Output Total 0 ml


 


Balance 1310 ml

















ISATU HAMLIN DO             Sep 7, 2020 11:22

## 2020-09-08 VITALS — DIASTOLIC BLOOD PRESSURE: 72 MMHG | SYSTOLIC BLOOD PRESSURE: 147 MMHG

## 2020-09-08 VITALS — SYSTOLIC BLOOD PRESSURE: 157 MMHG | DIASTOLIC BLOOD PRESSURE: 72 MMHG

## 2020-09-08 LAB
BUN SERPL-MCNC: 34 MG/DL (ref 7–18)
CALCIUM SERPL-MCNC: 7.9 MG/DL (ref 8.8–10.2)
CHLORIDE SERPL-SCNC: 112 MEQ/L (ref 98–107)
CO2 SERPL-SCNC: 28 MEQ/L (ref 21–32)
CREAT SERPL-MCNC: 1.28 MG/DL (ref 0.55–1.3)
GFR SERPL CREATININE-BSD FRML MDRD: 43.4 ML/MIN/{1.73_M2} (ref 39–?)
GLUCOSE SERPL-MCNC: 89 MG/DL (ref 70–100)
HCT VFR BLD AUTO: 34.6 % (ref 36–47)
HGB BLD-MCNC: 11.7 G/DL (ref 12–15.5)
MAGNESIUM SERPL-MCNC: 2.6 MG/DL (ref 1.8–2.4)
MCH RBC QN AUTO: 32.1 PG (ref 27–33)
MCHC RBC AUTO-ENTMCNC: 33.8 G/DL (ref 32–36.5)
MCV RBC AUTO: 95.1 FL (ref 80–96)
PLATELET # BLD AUTO: 245 10^3/UL (ref 150–450)
POTASSIUM SERPL-SCNC: 3.6 MEQ/L (ref 3.5–5.1)
RBC # BLD AUTO: 3.64 10^6/UL (ref 4–5.4)
SODIUM SERPL-SCNC: 142 MEQ/L (ref 136–145)
WBC # BLD AUTO: 10.7 10^3/UL (ref 4–10)

## 2020-09-08 RX ADMIN — Medication SCH MG: at 10:17

## 2020-09-08 RX ADMIN — MULTIPLE VITAMINS W/ MINERALS TAB SCH TAB: TAB at 20:15

## 2020-09-08 RX ADMIN — Medication SCH MG: at 20:15

## 2020-09-08 RX ADMIN — ASPIRIN SCH MG: 81 TABLET ORAL at 20:15

## 2020-09-08 RX ADMIN — HYDRALAZINE HYDROCHLORIDE SCH MG: 50 TABLET, FILM COATED ORAL at 20:16

## 2020-09-08 RX ADMIN — PROBIOTIC PRODUCT - TAB SCH EA: TAB at 10:18

## 2020-09-08 RX ADMIN — NEBIVOLOL HYDROCHLORIDE SCH MG: 5 TABLET ORAL at 10:20

## 2020-09-08 RX ADMIN — LOSARTAN POTASSIUM SCH MG: 50 TABLET, FILM COATED ORAL at 10:18

## 2020-09-08 RX ADMIN — HYDRALAZINE HYDROCHLORIDE SCH MG: 50 TABLET, FILM COATED ORAL at 17:02

## 2020-09-08 RX ADMIN — DICLOFENAC EPOLAMINE SCH PATCH: 0.01 SYSTEM TOPICAL at 10:17

## 2020-09-08 RX ADMIN — BACLOFEN SCH MG: 10 TABLET ORAL at 20:15

## 2020-09-08 RX ADMIN — ENOXAPARIN SODIUM SCH MG: 40 INJECTION SUBCUTANEOUS at 10:16

## 2020-09-08 RX ADMIN — DICLOFENAC EPOLAMINE SCH PATCH: 0.01 SYSTEM TOPICAL at 20:16

## 2020-09-08 RX ADMIN — HYDRALAZINE HYDROCHLORIDE SCH MG: 50 TABLET, FILM COATED ORAL at 10:19

## 2020-09-08 NOTE — IPNPDOC
Text Note


Date of Service


The patient was seen on 9/8/20.





NOTE


Subjective: No any acute events overnight. Continues to have hip and low back 

pain. Work with PT is limited due to pain. Has chronic right sided weakness from

MS.





Physical Examination


Vitals: As below


General: Laying in bed in no acute distress. 


Chest Exam: S1S2, clear to auscultation, Normal air movement


Heart Exam: Positive: Normal S1, Normal S2, no Rub or murmur or gallop


Abdomen Exam: Normal bowel sounds, Soft and nontender


Extremity Exam: Normal pulses, No edema. 


Skin Exam: Nl turgor and temperature


MS: tenderness along the lumbar spine going into the sacrum. She has 5/5 

strength in the left upper extremity, strength as 4/5 in the right lower 

extremity, 4/5 in right upper extremity.


Extr: No swelling, no cyanosis


Neuro: right sided weakness





Labs and radiology: reviewed





A/P


75-year-old female who has chronic right-sided weakness secondary to multiple 

sclerosis (MS), who fell backward as she was walking up a step. CT scan showed  

acute, nondisplaced fracture of the right sacral ala and an acute nondisplaced 

fracture through the S2 vertebral body. There is possible deformity through the 

left iliac wing.





Multiple sclerosis exacerbation


with Chronic right sided weakness.


completed course of Solu Medrol. 


MRI Brain: Stable size and appearance of prominent T2/FLAIR hyperintense white 

matter lesions along the periventricular white matter and corpus callosum, 

compatible  with provided history of multiple sclerosis. No acute findings.  


PT/OT


f/u with neuro in the outpt settings





Fall


Hx of back pain 2/2 Sacral Fractures / Osteoporosis


By definition she has osteoporosis bc of the vertebral fx


Ortho team rec pain control and weightbearing as tolerated with continued use of

her walker. 


she will likely be a candidate for Denosumab which can be started by her PCP / 

in the meanwhile will start Ca w Vitamin D supplements


PT/OT 





ANTONIETA on CKD-III


Improved


encourage oral intake


 


UTI (urinary tract infection)


UA positive for E.coli


completed course of levofloxacin.





HTN


BP under control


added Norvasc


increased hydralazine to TID





Leucocytosis


Most likely due to IV steroids for MS flare


Pt afebrile, denies chills. BP stable





Other medical Problems.


Raynauds 


CHF unspecified type


Chronic CAD / hx of MI


Right sided CEA





VS,Fishbone, I+O


VS, Fishbone, I+O


Laboratory Tests


9/8/20 05:32











Vital Signs








  Date Time  Temp Pulse Resp B/P (MAP) Pulse Ox O2 Delivery O2 Flow Rate FiO2


 


9/8/20 10:20    152/72    


 


9/8/20 06:00 98.1 64 18  96 Room Air  














I&O- Last 24 Hours up to 6 AM 


 


 9/8/20





 06:00


 


Intake Total 180 ml


 


Balance 180 ml

















TESS ROBLES MD                    Sep 8, 2020 13:00

## 2020-09-09 VITALS — DIASTOLIC BLOOD PRESSURE: 66 MMHG | SYSTOLIC BLOOD PRESSURE: 150 MMHG

## 2020-09-09 VITALS — SYSTOLIC BLOOD PRESSURE: 138 MMHG | DIASTOLIC BLOOD PRESSURE: 74 MMHG

## 2020-09-09 VITALS — SYSTOLIC BLOOD PRESSURE: 147 MMHG | DIASTOLIC BLOOD PRESSURE: 73 MMHG

## 2020-09-09 LAB
BUN SERPL-MCNC: 31 MG/DL (ref 7–18)
CALCIUM SERPL-MCNC: 8.4 MG/DL (ref 8.8–10.2)
CHLORIDE SERPL-SCNC: 110 MEQ/L (ref 98–107)
CO2 SERPL-SCNC: 29 MEQ/L (ref 21–32)
CREAT SERPL-MCNC: 1.17 MG/DL (ref 0.55–1.3)
GFR SERPL CREATININE-BSD FRML MDRD: 48.1 ML/MIN/{1.73_M2} (ref 39–?)
GLUCOSE SERPL-MCNC: 110 MG/DL (ref 70–100)
HCT VFR BLD AUTO: 36.9 % (ref 36–47)
HGB BLD-MCNC: 12.4 G/DL (ref 12–15.5)
MAGNESIUM SERPL-MCNC: 2.1 MG/DL (ref 1.8–2.4)
MCH RBC QN AUTO: 32 PG (ref 27–33)
MCHC RBC AUTO-ENTMCNC: 33.6 G/DL (ref 32–36.5)
MCV RBC AUTO: 95.3 FL (ref 80–96)
PLATELET # BLD AUTO: 279 10^3/UL (ref 150–450)
POTASSIUM SERPL-SCNC: 3.5 MEQ/L (ref 3.5–5.1)
RBC # BLD AUTO: 3.87 10^6/UL (ref 4–5.4)
SODIUM SERPL-SCNC: 144 MEQ/L (ref 136–145)
WBC # BLD AUTO: 12.5 10^3/UL (ref 4–10)

## 2020-09-09 RX ADMIN — HYDROCODONE BITARTRATE AND ACETAMINOPHEN PRN TAB: 5; 325 TABLET ORAL at 08:18

## 2020-09-09 RX ADMIN — HYDRALAZINE HYDROCHLORIDE SCH MG: 50 TABLET, FILM COATED ORAL at 08:19

## 2020-09-09 RX ADMIN — DICLOFENAC EPOLAMINE SCH PATCH: 0.01 SYSTEM TOPICAL at 08:21

## 2020-09-09 RX ADMIN — ASPIRIN SCH MG: 81 TABLET ORAL at 20:00

## 2020-09-09 RX ADMIN — NEBIVOLOL HYDROCHLORIDE SCH MG: 5 TABLET ORAL at 08:18

## 2020-09-09 RX ADMIN — PROBIOTIC PRODUCT - TAB SCH EA: TAB at 08:19

## 2020-09-09 RX ADMIN — ENOXAPARIN SODIUM SCH MG: 40 INJECTION SUBCUTANEOUS at 08:20

## 2020-09-09 RX ADMIN — HYDRALAZINE HYDROCHLORIDE SCH MG: 50 TABLET, FILM COATED ORAL at 16:20

## 2020-09-09 RX ADMIN — BACLOFEN SCH MG: 10 TABLET ORAL at 20:00

## 2020-09-09 RX ADMIN — MULTIPLE VITAMINS W/ MINERALS TAB SCH TAB: TAB at 20:01

## 2020-09-09 RX ADMIN — LOSARTAN POTASSIUM SCH MG: 50 TABLET, FILM COATED ORAL at 08:20

## 2020-09-09 RX ADMIN — HYDRALAZINE HYDROCHLORIDE SCH MG: 50 TABLET, FILM COATED ORAL at 20:00

## 2020-09-09 RX ADMIN — Medication SCH MG: at 08:20

## 2020-09-09 RX ADMIN — Medication SCH MG: at 20:00

## 2020-09-09 RX ADMIN — DICLOFENAC EPOLAMINE SCH PATCH: 0.01 SYSTEM TOPICAL at 20:00

## 2020-09-09 RX ADMIN — HYDROCODONE BITARTRATE AND ACETAMINOPHEN PRN TAB: 5; 325 TABLET ORAL at 16:20

## 2020-09-10 VITALS — DIASTOLIC BLOOD PRESSURE: 68 MMHG | SYSTOLIC BLOOD PRESSURE: 149 MMHG

## 2020-09-10 VITALS — SYSTOLIC BLOOD PRESSURE: 150 MMHG | DIASTOLIC BLOOD PRESSURE: 65 MMHG

## 2020-09-10 VITALS — SYSTOLIC BLOOD PRESSURE: 157 MMHG | DIASTOLIC BLOOD PRESSURE: 67 MMHG

## 2020-09-10 RX ADMIN — HYDROCODONE BITARTRATE AND ACETAMINOPHEN PRN TAB: 5; 325 TABLET ORAL at 08:40

## 2020-09-10 RX ADMIN — Medication SCH MG: at 09:13

## 2020-09-10 RX ADMIN — PROBIOTIC PRODUCT - TAB SCH EA: TAB at 09:13

## 2020-09-10 RX ADMIN — HYDRALAZINE HYDROCHLORIDE SCH MG: 50 TABLET, FILM COATED ORAL at 20:52

## 2020-09-10 RX ADMIN — ASPIRIN SCH MG: 81 TABLET ORAL at 20:51

## 2020-09-10 RX ADMIN — ENOXAPARIN SODIUM SCH MG: 40 INJECTION SUBCUTANEOUS at 09:12

## 2020-09-10 RX ADMIN — HYDRALAZINE HYDROCHLORIDE SCH MG: 50 TABLET, FILM COATED ORAL at 09:12

## 2020-09-10 RX ADMIN — DICLOFENAC EPOLAMINE SCH PATCH: 0.01 SYSTEM TOPICAL at 20:52

## 2020-09-10 RX ADMIN — LOSARTAN POTASSIUM SCH MG: 50 TABLET, FILM COATED ORAL at 09:13

## 2020-09-10 RX ADMIN — MULTIPLE VITAMINS W/ MINERALS TAB SCH TAB: TAB at 20:51

## 2020-09-10 RX ADMIN — Medication SCH MG: at 20:51

## 2020-09-10 RX ADMIN — HYDRALAZINE HYDROCHLORIDE SCH MG: 50 TABLET, FILM COATED ORAL at 16:35

## 2020-09-10 RX ADMIN — HYDROCODONE BITARTRATE AND ACETAMINOPHEN PRN TAB: 5; 325 TABLET ORAL at 12:40

## 2020-09-10 RX ADMIN — NEBIVOLOL HYDROCHLORIDE SCH MG: 5 TABLET ORAL at 09:13

## 2020-09-10 RX ADMIN — DICLOFENAC EPOLAMINE SCH PATCH: 0.01 SYSTEM TOPICAL at 09:11

## 2020-09-10 RX ADMIN — BACLOFEN SCH MG: 10 TABLET ORAL at 20:53

## 2020-09-11 VITALS — DIASTOLIC BLOOD PRESSURE: 57 MMHG | SYSTOLIC BLOOD PRESSURE: 139 MMHG

## 2020-09-11 VITALS — DIASTOLIC BLOOD PRESSURE: 74 MMHG | SYSTOLIC BLOOD PRESSURE: 159 MMHG

## 2020-09-11 RX ADMIN — HYDRALAZINE HYDROCHLORIDE SCH MG: 50 TABLET, FILM COATED ORAL at 17:33

## 2020-09-11 RX ADMIN — PROBIOTIC PRODUCT - TAB SCH EA: TAB at 08:15

## 2020-09-11 RX ADMIN — ENOXAPARIN SODIUM SCH MG: 40 INJECTION SUBCUTANEOUS at 08:04

## 2020-09-11 RX ADMIN — ASPIRIN SCH MG: 81 TABLET ORAL at 20:12

## 2020-09-11 RX ADMIN — HYDRALAZINE HYDROCHLORIDE SCH MG: 50 TABLET, FILM COATED ORAL at 20:13

## 2020-09-11 RX ADMIN — LOSARTAN POTASSIUM SCH MG: 50 TABLET, FILM COATED ORAL at 08:15

## 2020-09-11 RX ADMIN — Medication SCH MG: at 20:12

## 2020-09-11 RX ADMIN — BACLOFEN SCH MG: 10 TABLET ORAL at 20:12

## 2020-09-11 RX ADMIN — HYDRALAZINE HYDROCHLORIDE SCH MG: 50 TABLET, FILM COATED ORAL at 08:15

## 2020-09-11 RX ADMIN — NEBIVOLOL HYDROCHLORIDE SCH MG: 5 TABLET ORAL at 08:05

## 2020-09-11 RX ADMIN — DICLOFENAC EPOLAMINE SCH PATCH: 0.01 SYSTEM TOPICAL at 20:14

## 2020-09-11 RX ADMIN — MULTIPLE VITAMINS W/ MINERALS TAB SCH TAB: TAB at 20:12

## 2020-09-11 RX ADMIN — Medication SCH MG: at 08:15

## 2020-09-11 RX ADMIN — DICLOFENAC EPOLAMINE SCH PATCH: 0.01 SYSTEM TOPICAL at 08:05

## 2020-09-11 RX ADMIN — HYDROCODONE BITARTRATE AND ACETAMINOPHEN PRN TAB: 5; 325 TABLET ORAL at 14:27

## 2020-09-11 RX ADMIN — HYDROCODONE BITARTRATE AND ACETAMINOPHEN PRN TAB: 5; 325 TABLET ORAL at 08:15

## 2020-09-12 VITALS — SYSTOLIC BLOOD PRESSURE: 121 MMHG | DIASTOLIC BLOOD PRESSURE: 58 MMHG

## 2020-09-12 RX ADMIN — ASPIRIN SCH MG: 81 TABLET ORAL at 20:53

## 2020-09-12 RX ADMIN — DICLOFENAC EPOLAMINE SCH PATCH: 0.01 SYSTEM TOPICAL at 20:54

## 2020-09-12 RX ADMIN — ENOXAPARIN SODIUM SCH MG: 40 INJECTION SUBCUTANEOUS at 08:15

## 2020-09-12 RX ADMIN — HYDRALAZINE HYDROCHLORIDE SCH MG: 50 TABLET, FILM COATED ORAL at 08:13

## 2020-09-12 RX ADMIN — LOSARTAN POTASSIUM SCH MG: 50 TABLET, FILM COATED ORAL at 08:14

## 2020-09-12 RX ADMIN — Medication SCH MG: at 08:14

## 2020-09-12 RX ADMIN — HYDRALAZINE HYDROCHLORIDE SCH MG: 50 TABLET, FILM COATED ORAL at 20:54

## 2020-09-12 RX ADMIN — MULTIPLE VITAMINS W/ MINERALS TAB SCH TAB: TAB at 20:53

## 2020-09-12 RX ADMIN — BACLOFEN SCH MG: 10 TABLET ORAL at 20:53

## 2020-09-12 RX ADMIN — POLYETHYLENE GLYCOL 3350 PRN PKT: 17 POWDER, FOR SOLUTION ORAL at 08:16

## 2020-09-12 RX ADMIN — NEBIVOLOL HYDROCHLORIDE SCH MG: 5 TABLET ORAL at 08:13

## 2020-09-12 RX ADMIN — Medication SCH MG: at 20:53

## 2020-09-12 RX ADMIN — DICLOFENAC EPOLAMINE SCH PATCH: 0.01 SYSTEM TOPICAL at 08:15

## 2020-09-12 RX ADMIN — HYDRALAZINE HYDROCHLORIDE SCH MG: 50 TABLET, FILM COATED ORAL at 15:06

## 2020-09-12 RX ADMIN — PROBIOTIC PRODUCT - TAB SCH EA: TAB at 08:13

## 2020-09-12 RX ADMIN — HYDROCODONE BITARTRATE AND ACETAMINOPHEN PRN TAB: 5; 325 TABLET ORAL at 16:20

## 2020-09-13 VITALS — SYSTOLIC BLOOD PRESSURE: 131 MMHG | DIASTOLIC BLOOD PRESSURE: 76 MMHG

## 2020-09-13 RX ADMIN — LOSARTAN POTASSIUM SCH MG: 50 TABLET, FILM COATED ORAL at 08:28

## 2020-09-13 RX ADMIN — NEBIVOLOL HYDROCHLORIDE SCH MG: 5 TABLET ORAL at 08:29

## 2020-09-13 RX ADMIN — Medication SCH MG: at 08:28

## 2020-09-13 RX ADMIN — BACLOFEN SCH MG: 10 TABLET ORAL at 22:02

## 2020-09-13 RX ADMIN — HYDRALAZINE HYDROCHLORIDE SCH MG: 50 TABLET, FILM COATED ORAL at 22:03

## 2020-09-13 RX ADMIN — Medication SCH MG: at 22:02

## 2020-09-13 RX ADMIN — MAGNESIUM HYDROXIDE PRN ML: 400 SUSPENSION ORAL at 08:24

## 2020-09-13 RX ADMIN — DICLOFENAC EPOLAMINE SCH PATCH: 0.01 SYSTEM TOPICAL at 08:25

## 2020-09-13 RX ADMIN — PROBIOTIC PRODUCT - TAB SCH EA: TAB at 08:28

## 2020-09-13 RX ADMIN — MULTIPLE VITAMINS W/ MINERALS TAB SCH TAB: TAB at 22:03

## 2020-09-13 RX ADMIN — ENOXAPARIN SODIUM SCH MG: 40 INJECTION SUBCUTANEOUS at 08:25

## 2020-09-13 RX ADMIN — POLYETHYLENE GLYCOL 3350 PRN PKT: 17 POWDER, FOR SOLUTION ORAL at 08:25

## 2020-09-13 RX ADMIN — HYDRALAZINE HYDROCHLORIDE SCH MG: 50 TABLET, FILM COATED ORAL at 16:10

## 2020-09-13 RX ADMIN — DICLOFENAC EPOLAMINE SCH PATCH: 0.01 SYSTEM TOPICAL at 22:02

## 2020-09-13 RX ADMIN — ASPIRIN SCH MG: 81 TABLET ORAL at 22:02

## 2020-09-13 RX ADMIN — HYDRALAZINE HYDROCHLORIDE SCH MG: 50 TABLET, FILM COATED ORAL at 08:28

## 2020-09-14 VITALS — DIASTOLIC BLOOD PRESSURE: 67 MMHG | SYSTOLIC BLOOD PRESSURE: 155 MMHG

## 2020-09-14 LAB
BUN SERPL-MCNC: 26 MG/DL (ref 7–18)
CALCIUM SERPL-MCNC: 9.4 MG/DL (ref 8.8–10.2)
CHLORIDE SERPL-SCNC: 108 MEQ/L (ref 98–107)
CO2 SERPL-SCNC: 29 MEQ/L (ref 21–32)
CREAT SERPL-MCNC: 1.45 MG/DL (ref 0.55–1.3)
GFR SERPL CREATININE-BSD FRML MDRD: 37.6 ML/MIN/{1.73_M2} (ref 39–?)
GLUCOSE SERPL-MCNC: 86 MG/DL (ref 70–100)
HCT VFR BLD AUTO: 35 % (ref 36–47)
HGB BLD-MCNC: 11.4 G/DL (ref 12–15.5)
MCH RBC QN AUTO: 32.9 PG (ref 27–33)
MCHC RBC AUTO-ENTMCNC: 32.6 G/DL (ref 32–36.5)
MCV RBC AUTO: 100.9 FL (ref 80–96)
PLATELET # BLD AUTO: 305 10^3/UL (ref 150–450)
POTASSIUM SERPL-SCNC: 4.7 MEQ/L (ref 3.5–5.1)
RBC # BLD AUTO: 3.47 10^6/UL (ref 4–5.4)
SODIUM SERPL-SCNC: 143 MEQ/L (ref 136–145)
WBC # BLD AUTO: 9.9 10^3/UL (ref 4–10)

## 2020-09-14 RX ADMIN — HYDRALAZINE HYDROCHLORIDE SCH MG: 50 TABLET, FILM COATED ORAL at 08:52

## 2020-09-14 RX ADMIN — DICLOFENAC EPOLAMINE SCH PATCH: 0.01 SYSTEM TOPICAL at 08:49

## 2020-09-14 RX ADMIN — ACETAMINOPHEN PRN MG: 325 TABLET ORAL at 09:01

## 2020-09-14 RX ADMIN — MAGNESIUM HYDROXIDE PRN ML: 400 SUSPENSION ORAL at 15:36

## 2020-09-14 RX ADMIN — Medication SCH MG: at 20:12

## 2020-09-14 RX ADMIN — BACLOFEN SCH MG: 10 TABLET ORAL at 20:12

## 2020-09-14 RX ADMIN — DICLOFENAC EPOLAMINE SCH PATCH: 0.01 SYSTEM TOPICAL at 20:12

## 2020-09-14 RX ADMIN — POLYETHYLENE GLYCOL 3350 PRN PKT: 17 POWDER, FOR SOLUTION ORAL at 15:36

## 2020-09-14 RX ADMIN — HYDRALAZINE HYDROCHLORIDE SCH MG: 50 TABLET, FILM COATED ORAL at 15:38

## 2020-09-14 RX ADMIN — MULTIPLE VITAMINS W/ MINERALS TAB SCH TAB: TAB at 20:12

## 2020-09-14 RX ADMIN — LOSARTAN POTASSIUM SCH MG: 50 TABLET, FILM COATED ORAL at 08:51

## 2020-09-14 RX ADMIN — HYDRALAZINE HYDROCHLORIDE SCH MG: 50 TABLET, FILM COATED ORAL at 20:12

## 2020-09-14 RX ADMIN — ASPIRIN SCH MG: 81 TABLET ORAL at 20:11

## 2020-09-14 RX ADMIN — NEBIVOLOL HYDROCHLORIDE SCH MG: 5 TABLET ORAL at 08:51

## 2020-09-14 RX ADMIN — ENOXAPARIN SODIUM SCH MG: 40 INJECTION SUBCUTANEOUS at 08:49

## 2020-09-14 RX ADMIN — PROBIOTIC PRODUCT - TAB SCH EA: TAB at 08:49

## 2020-09-14 RX ADMIN — Medication SCH MG: at 08:49

## 2020-09-14 NOTE — ECGEPIP
[facility] - ED

                                       

                                       Test Date:    2020

Pat Name:     PRASANTH SYED            Department:   

Patient ID:   [id number]                 Room:         -

Gender:       Female                   Technician:   JAIRO

:          1945               Requested By: Maja Lundborg-Gray 

Order Number: [id number]     Reading MD:   Sofi Vaca

                                 Measurements

Intervals                              Axis          

Rate:         66                       P:            10

CO:           139                      QRS:          30

QRSD:         83                       T:            71

QT:           430                                    

QTc:          453                                    

                           Interpretive Statements

SINUS RHYTHM

SEPTAL MYOCARDIAL INFARCTION, OF INDETERMINATE AGE

MODERATE T-WAVE ABNORMALITY, CONSIDER ANTERIOR ISCHEMIA

ABNORMAL ECG

SEE SCANNED DOWNTIME REPORT

## 2020-09-14 NOTE — IPNPDOC
Text Note


Date of Service


The patient was seen on 9/14/20.





NOTE


Subjective: No any acute events overnight. Continues to have hip and low back 

pain but better controlled now wit scheduled pain meds. No pain when she is 

laying down flat.  She was able to sit up in the chair this morning and have 

breakfast.  Has chronic right sided weakness from MS.





Physical Examination


Vitals: As below


General: Laying in bed in no acute distress. 


Chest Exam: S1S2, clear to auscultation, Normal air movement


Heart Exam: Positive: Normal S1, Normal S2, no Rub or murmur or gallop


Abdomen Exam: Normal bowel sounds, Soft and nontender


Extremity Exam: Normal pulses, No edema. 


Skin Exam: Nl turgor and temperature


MS: tenderness along the lumbar spine going into the sacrum. She has 5/5 

strength in the left upper extremity, strength as 4/5 in the right lower 

extremity, 4/5 in right upper extremity.


Extr: No swelling, no cyanosis


Neuro: right sided weakness





Labs and radiology: reviewed





A/P


75-year-old female who has chronic right-sided weakness secondary to multiple 

sclerosis (MS), who fell backward as she was walking up a step. CT scan showed  

acute, nondisplaced fracture of the right sacral ala and an acute nondisplaced 

fracture through the S2 vertebral body. There is possible deformity through the 

left iliac wing.





Multiple sclerosis exacerbation


with Chronic right sided weakness.


completed course of Solu Medrol 09/01 to 09/05


MRI Brain: Stable size and appearance of prominent T2/FLAIR hyperintense white 

matter lesions along the periventricular white matter and corpus callosum, 

compatible  with MS


Continue PT/OT


Patient is on Avonex as outpatient. It is OK to hold Avonex for 2 months . She 

being in her 70s her immune system is already suppressed so minimal chance of 

flare. If falre occurs while in rehab she can get solumedrol. Discussed with Dr Mari english/peggy with neuro in the outpt settings





Fall


Hx of back pain 2/2 Sacral Fractures / Osteoporosis


By definition she has osteoporosis bc of the vertebral fx


Ortho team rec pain control and weightbearing as tolerated with continued use of

her walker. 


she will likely be a candidate for Denosumab which can be started by her PCP / 

in the meanwhile will start Ca w Vitamin D supplements


PT/OT 





ANTONIETA on CKD-III


Improved


encourage oral intake


 


UTI (urinary tract infection)


UA positive for E.coli


completed course of levofloxacin.





HTN


BP under control


added Norvasc


increased hydralazine to TID





Leucocytosis


Most likely due to IV steroids for MS flare


Pt afebrile, denies chills. BP stable





Other medical Problems.


Raynauds 


CHF unspecified type


Chronic CAD / hx of MI


PAD with Right sided CEA





VS,Fishbone, I+O


VS, Fishbone, I+O


Laboratory Tests


9/14/20 06:39











Vital Signs








  Date Time  Temp Pulse Resp B/P (MAP) Pulse Ox O2 Delivery O2 Flow Rate FiO2


 


9/14/20 15:38    132/57    


 


9/14/20 13:00   17     


 


9/14/20 08:51  63      


 


9/14/20 06:00 98.0    97 Room Air  














I&O- Last 24 Hours up to 6 AM 


 


 9/14/20





 06:00


 


Intake Total 520 ml


 


Output Total 0 ml


 


Balance 520 ml

















TESS ROBLES MD                   Sep 14, 2020 17:06

## 2020-09-15 VITALS — SYSTOLIC BLOOD PRESSURE: 128 MMHG | DIASTOLIC BLOOD PRESSURE: 66 MMHG

## 2020-09-15 RX ADMIN — PROBIOTIC PRODUCT - TAB SCH EA: TAB at 08:46

## 2020-09-15 RX ADMIN — ENOXAPARIN SODIUM SCH MG: 40 INJECTION SUBCUTANEOUS at 08:46

## 2020-09-15 RX ADMIN — BACLOFEN SCH MG: 10 TABLET ORAL at 20:30

## 2020-09-15 RX ADMIN — DICLOFENAC EPOLAMINE SCH PATCH: 0.01 SYSTEM TOPICAL at 20:30

## 2020-09-15 RX ADMIN — NEBIVOLOL HYDROCHLORIDE SCH MG: 5 TABLET ORAL at 09:09

## 2020-09-15 RX ADMIN — MULTIPLE VITAMINS W/ MINERALS TAB SCH TAB: TAB at 20:30

## 2020-09-15 RX ADMIN — HYDRALAZINE HYDROCHLORIDE SCH MG: 50 TABLET, FILM COATED ORAL at 20:32

## 2020-09-15 RX ADMIN — Medication SCH MG: at 20:30

## 2020-09-15 RX ADMIN — DICLOFENAC EPOLAMINE SCH PATCH: 0.01 SYSTEM TOPICAL at 08:48

## 2020-09-15 RX ADMIN — ASPIRIN SCH MG: 81 TABLET ORAL at 20:30

## 2020-09-15 RX ADMIN — Medication SCH MG: at 08:48

## 2020-09-15 RX ADMIN — LOSARTAN POTASSIUM SCH MG: 50 TABLET, FILM COATED ORAL at 09:01

## 2020-09-15 RX ADMIN — HYDRALAZINE HYDROCHLORIDE SCH MG: 50 TABLET, FILM COATED ORAL at 09:01

## 2020-09-15 RX ADMIN — HYDRALAZINE HYDROCHLORIDE SCH MG: 50 TABLET, FILM COATED ORAL at 15:55

## 2020-09-15 RX ADMIN — ACETAMINOPHEN PRN MG: 325 TABLET ORAL at 08:47

## 2020-09-16 VITALS — SYSTOLIC BLOOD PRESSURE: 134 MMHG | DIASTOLIC BLOOD PRESSURE: 59 MMHG

## 2020-09-16 VITALS — SYSTOLIC BLOOD PRESSURE: 108 MMHG | DIASTOLIC BLOOD PRESSURE: 58 MMHG

## 2020-09-16 RX ADMIN — DICLOFENAC EPOLAMINE SCH PATCH: 0.01 SYSTEM TOPICAL at 21:28

## 2020-09-16 RX ADMIN — HYDRALAZINE HYDROCHLORIDE SCH MG: 50 TABLET, FILM COATED ORAL at 21:00

## 2020-09-16 RX ADMIN — LOSARTAN POTASSIUM SCH MG: 50 TABLET, FILM COATED ORAL at 09:32

## 2020-09-16 RX ADMIN — BACLOFEN SCH MG: 10 TABLET ORAL at 21:28

## 2020-09-16 RX ADMIN — NEBIVOLOL HYDROCHLORIDE SCH MG: 5 TABLET ORAL at 09:32

## 2020-09-16 RX ADMIN — HYDRALAZINE HYDROCHLORIDE SCH MG: 50 TABLET, FILM COATED ORAL at 09:31

## 2020-09-16 RX ADMIN — Medication SCH MG: at 09:32

## 2020-09-16 RX ADMIN — MULTIPLE VITAMINS W/ MINERALS TAB SCH TAB: TAB at 21:28

## 2020-09-16 RX ADMIN — HYDRALAZINE HYDROCHLORIDE SCH MG: 50 TABLET, FILM COATED ORAL at 17:44

## 2020-09-16 RX ADMIN — PROBIOTIC PRODUCT - TAB SCH EA: TAB at 09:29

## 2020-09-16 RX ADMIN — ENOXAPARIN SODIUM SCH MG: 40 INJECTION SUBCUTANEOUS at 09:34

## 2020-09-16 RX ADMIN — DICLOFENAC EPOLAMINE SCH PATCH: 0.01 SYSTEM TOPICAL at 09:34

## 2020-09-16 RX ADMIN — ASPIRIN SCH MG: 81 TABLET ORAL at 21:28

## 2020-09-16 RX ADMIN — Medication SCH MG: at 21:28

## 2020-09-16 NOTE — IPNPDOC
Date Seen


The patient was seen on 9/16/20.





Progress Note


Subjective: No any acute events overnight. Having increased pain on L pelvis, 

also concern per PT. Patient was unable to participate in PT due to pain when 

standing. Minimal pain at rest in bed or chair.





Physical Examination





Vitals: As below


General: Laying in bed in no acute distress. 


Chest Exam: CTAB


Heart Exam: normal S1, S2, RRR


Abdomen Exam: Normal bowel sounds, Soft and nontender


Extremity Exam: Normal pulses, No edema. 


Skin Exam: Nl turgor and temperature


MS: tenderness along the lumbar spine going into the sacrum. She has 5/5 

strength in the left upper extremity, strength as 4/5 in the right lower 

extremity, 4/5 in right upper extremity.


Extr: No swelling, no cyanosis


Neuro: right sided weakness





Labs and radiology: reviewed





A/P


75-year-old female who has chronic right-sided weakness secondary to multiple 

sclerosis (MS), who fell backward as she was walking up a step. CT scan showed  

acute, nondisplaced fracture of the right sacral ala and an acute nondisplaced 

fracture through the S2 vertebral body. There is possible deformity through the 

left iliac wing.





Multiple sclerosis exacerbation


with Chronic right sided weakness.


completed course of Solu Medrol 09/01 to 09/05


MRI Brain: Stable size and appearance of prominent T2/FLAIR hyperintense white 

matter lesions along the periventricular white matter and corpus callosum, 

compatible  with MS


Continue PT/OT


Patient is on Avonex as outpatient. It is OK to hold Avonex for 2 months . She 

being in her 70s her immune system is already suppressed so minimal chance of 

flare. If flare occurs while in rehab she can get solumedrol. Discussed with Dr Guerra 


f/u with neuro in the outpt settings





Fall


Hx of back pain 2/2 Sacral Fractures / Osteoporosis


By definition she has osteoporosis bc of the vertebral fx


Ortho team rec pain control and weightbearing as tolerated with continued use of

her walker. 


she will likely be a candidate for Denosumab which can be started by her PCP / 

in the meanwhile will start Ca w Vitamin D supplements


PT/OT 


will discussed with ortho increased pain, perhaps added imaging.





ANTONIETA on CKD-III


Improved


encourage oral intake


 


UTI (urinary tract infection)


UA positive for E.coli


completed course of levofloxacin.





HTN


BP under control


added Norvasc


increased hydralazine to TID





Leucocytosis


Most likely due to IV steroids for MS flare


Pt afebrile, denies chills. BP stable





Other medical Problems.


Raynauds 


CHF unspecified type


Chronic CAD / hx of MI


PAD with Right sided CEA





VS, I&O, 24H, Fishbone


Vital Signs/I&O





Vital Signs








  Date Time  Temp Pulse Resp B/P (MAP) Pulse Ox O2 Delivery O2 Flow Rate FiO2


 


9/16/20 18:30   16     


 


9/16/20 17:46      Room Air  


 


9/16/20 17:44    135/63    


 


9/16/20 09:32  68      


 


9/16/20 06:00 97.6    96   














I&O- Last 24 Hours up to 6 AM 


 


 9/16/20





 06:00


 


Intake Total 1230 ml


 


Output Total 0 ml


 


Balance 1230 ml











Laboratory Data


Microbiology





Microbiology


9/7/20 Blood Culture - Final, Complete


         NO GROWTH AFTER 5 DAYS











HERB FRANK MD       Sep 16, 2020 20:27

## 2020-09-17 VITALS — SYSTOLIC BLOOD PRESSURE: 102 MMHG | DIASTOLIC BLOOD PRESSURE: 50 MMHG

## 2020-09-17 VITALS — SYSTOLIC BLOOD PRESSURE: 137 MMHG | DIASTOLIC BLOOD PRESSURE: 73 MMHG

## 2020-09-17 VITALS — SYSTOLIC BLOOD PRESSURE: 146 MMHG | DIASTOLIC BLOOD PRESSURE: 65 MMHG

## 2020-09-17 LAB
BUN SERPL-MCNC: 34 MG/DL (ref 7–18)
CALCIUM SERPL-MCNC: 9.8 MG/DL (ref 8.8–10.2)
CHLORIDE SERPL-SCNC: 108 MEQ/L (ref 98–107)
CO2 SERPL-SCNC: 28 MEQ/L (ref 21–32)
CREAT SERPL-MCNC: 1.59 MG/DL (ref 0.55–1.3)
GFR SERPL CREATININE-BSD FRML MDRD: 33.8 ML/MIN/{1.73_M2} (ref 39–?)
GLUCOSE SERPL-MCNC: 93 MG/DL (ref 70–100)
HCT VFR BLD AUTO: 35.5 % (ref 36–47)
HGB BLD-MCNC: 11.4 G/DL (ref 12–15.5)
MCH RBC QN AUTO: 32.7 PG (ref 27–33)
MCHC RBC AUTO-ENTMCNC: 32.1 G/DL (ref 32–36.5)
MCV RBC AUTO: 101.7 FL (ref 80–96)
PLATELET # BLD AUTO: 310 10^3/UL (ref 150–450)
POTASSIUM SERPL-SCNC: 4.9 MEQ/L (ref 3.5–5.1)
RBC # BLD AUTO: 3.49 10^6/UL (ref 4–5.4)
SODIUM SERPL-SCNC: 141 MEQ/L (ref 136–145)
WBC # BLD AUTO: 7.6 10^3/UL (ref 4–10)

## 2020-09-17 RX ADMIN — ASPIRIN SCH MG: 81 TABLET ORAL at 20:02

## 2020-09-17 RX ADMIN — PROBIOTIC PRODUCT - TAB SCH EA: TAB at 08:45

## 2020-09-17 RX ADMIN — HYDRALAZINE HYDROCHLORIDE SCH MG: 50 TABLET, FILM COATED ORAL at 08:47

## 2020-09-17 RX ADMIN — BACLOFEN SCH MG: 10 TABLET ORAL at 20:02

## 2020-09-17 RX ADMIN — Medication SCH DOSE: at 20:03

## 2020-09-17 RX ADMIN — ENOXAPARIN SODIUM SCH MG: 40 INJECTION SUBCUTANEOUS at 08:45

## 2020-09-17 RX ADMIN — SODIUM CHLORIDE SCH MLS/HR: 9 INJECTION, SOLUTION INTRAVENOUS at 20:03

## 2020-09-17 RX ADMIN — Medication SCH DOSE: at 13:47

## 2020-09-17 RX ADMIN — Medication SCH MG: at 08:45

## 2020-09-17 RX ADMIN — HYDRALAZINE HYDROCHLORIDE SCH MG: 50 TABLET, FILM COATED ORAL at 16:00

## 2020-09-17 RX ADMIN — LOSARTAN POTASSIUM SCH MG: 50 TABLET, FILM COATED ORAL at 08:46

## 2020-09-17 RX ADMIN — SODIUM CHLORIDE SCH MLS/HR: 9 INJECTION, SOLUTION INTRAVENOUS at 17:05

## 2020-09-17 RX ADMIN — NEBIVOLOL HYDROCHLORIDE SCH MG: 5 TABLET ORAL at 08:56

## 2020-09-17 RX ADMIN — HYDRALAZINE HYDROCHLORIDE SCH MG: 50 TABLET, FILM COATED ORAL at 20:02

## 2020-09-17 RX ADMIN — SODIUM CHLORIDE SCH MLS/HR: 9 INJECTION, SOLUTION INTRAVENOUS at 09:12

## 2020-09-17 RX ADMIN — MULTIPLE VITAMINS W/ MINERALS TAB SCH TAB: TAB at 20:02

## 2020-09-17 RX ADMIN — Medication SCH MG: at 20:02

## 2020-09-17 NOTE — REPVR
PROCEDURE INFORMATION: 

Exam: CT Pelvis Without Contrast; Skeletal 

Exam date and time: 9/17/2020 10:24 AM 

Age: 75 years old 

Clinical indication: Pelvic pain; Additional info: Pelvic pain, prior R pelvic 

fracture. 



TECHNIQUE: 

Imaging protocol: Computed tomography images of the pelvis without contrast. 

Exam focused on the skeletal structures. 

Radiation optimization: All CT scans at this facility use at least one of these 

dose optimization techniques: automated exposure control; mA and/or kV 

adjustment per patient size (includes targeted exams where dose is matched to 

clinical indication); or iterative reconstruction. 



COMPARISON: 

CT Pelvis without contrast 9/3/2020 3:48 PM 



FINDINGS: 

Bladder: The bladder is distended. 

Reproductive: Hysterectomy. 

Vasculature: There is moderate calcific atherosclerosis of the abdominal aorta 

and iliac arteries. There is no aneurysm. 



Bones/joints: Stable nondisplaced oblique fracture at the anterior cortex of 

the right sacral ala at the S1 level (coronal image 203 and axial image 23). 

Stable oblique fracture at the anterior cortex of the S2 vertebral segment 

(axial image 24 and sagittal image 59). Minimal deformity of the mid left iliac 

wing is not a fracture (axial image 20 and sagittal image 90). No new fracture. 

No osteoarthritis at the hips. 

Soft tissues: Calcified injection granulomas in the subcutaneous fat of the 

buttocks bilaterally. 



IMPRESSION: 

1. Stable nondisplaced oblique fracture at the anterior right sacral ala at the 

S1 level. 

2. Stable oblique fracture at the anterior cortex of the S2 vertebral segment. 

3. No interval change when compared to most recent CT 14 days ago. 

4. If additional imaging is clinically indicated, MRI would be more sensitive. 



Electronically signed by: Joon Mak On 09/17/2020  11:46:24 AM

## 2020-09-17 NOTE — IPNPDOC
Date Seen


The patient was seen on 9/17/20.





Progress Note


Subjective: No any acute events overnight. Having increased pain on L pelvis, 

also concern per PT. Patient was unable to participate in PT due to pain when 

standing. Minimal pain at rest in bed or chair.





Physical Examination





Vitals: As below


General: Laying in bed in no acute distress. 


Chest Exam: CTAB


Heart Exam: normal S1, S2, RRR


Abdomen Exam: Normal bowel sounds, Soft and nontender


Extremity Exam: Normal pulses, No edema. 


Skin Exam: Nl turgor and temperature


MS: tenderness along the lumbar spine going into the sacrum. She has 5/5 

strength in the left upper extremity, strength as 4/5 in the right lower 

extremity, 4/5 in right upper extremity.


Extr: No swelling, no cyanosis


Neuro: right sided weakness





Labs and radiology: reviewed





A/P


75-year-old female who has chronic right-sided weakness secondary to multiple 

sclerosis (MS), who fell backward as she was walking up a step. CT scan showed  

acute, nondisplaced fracture of the right sacral ala and an acute nondisplaced 

fracture through the S2 vertebral body. There is possible deformity through the 

left iliac wing. Today, patient is unable to participate with PT due to pelvic 

pain on standing/sitting. Further, ANTONIETA noted likely 2/2 diclofenac patch use. 

Will change from [facility] to inpatient status. 





Multiple sclerosis exacerbation


-with Chronic right sided weakness.


-completed course of Solu Medrol 09/01 to 09/05


-MRI Brain: Stable size and appearance of prominent T2/FLAIR hyperintense white 

matter lesions along the -periventricular white matter and corpus callosum, 

compatible  with MS


-Patient is on Avonex as outpatient. It is OK to hold Avonex for 2 months . She 

being in her 70s her immune system is already suppressed so minimal chance of 

flare. If flare occurs while in rehab she can get solumedrol. Discussed with Dr Guerra 


-f/u with neuro in the outpt settings





Fall


- R sacral fx, S2 vertebral body fx


-  was seen by ortho service on 9/3/20, plan for conservative therapy, WBAT, PT 

and to f/u ortho clinic 1 week for repeat xrays


- patient unable to participate in PT due to pain


- spoke to ortho service - will repeat CT pelvis wo contrast


-she will likely be a candidate for Denosumab which can be started by her PCP / 

in the meanwhile will start Ca w -Vitamin D supplements


- PT/OT as tolerating





ANTONIETA on CKD-III


- Cr risiing 1.45


- DC diclofenac patch


- IVF


 


UTI (urinary tract infection)


-UA positive for E.coli


-completed course of levofloxacin.





HTN


-BP under control


-added Norvasc


-increased hydralazine to TID





Leucocytosis


- resolved


-Most likely due to IV steroids for MS flare


-Pt afebrile, denies chills. BP stable





Other medical Problems.


Raynauds 


CHF unspecified type


Chronic CAD / hx of MI


PAD with Right sided CEA





VS, I&O, 24H, Fishbone


Vital Signs/I&O





Vital Signs








  Date Time  Temp Pulse Resp B/P (MAP) Pulse Ox O2 Delivery O2 Flow Rate FiO2


 


9/17/20 08:56  64  143/61    


 


9/17/20 06:00 97.6  18  96 Room Air  














I&O- Last 24 Hours up to 6 AM 


 


 9/17/20





 06:00


 


Intake Total 780 ml


 


Output Total 0 ml


 


Balance 780 ml











Laboratory Data


24H LABS


Laboratory Tests 2


9/17/20 05:53: 


Nucleated Red Blood Cells % (auto) 0.0, Anion Gap 5L, Glomerular Filtration Rate

33.8L, Calcium Level 9.8


CBC/BMP


Laboratory Tests


9/17/20 05:53








Microbiology





Microbiology


9/7/20 Blood Culture - Final, Complete


         NO GROWTH AFTER 5 DAYS











HERB FRANK MD       Sep 17, 2020 10:31

## 2020-09-17 NOTE — REPVR
PROCEDURE INFORMATION: 

Exam: CT Pelvis Without Contrast; Skeletal 

Exam date and time: 9/17/2020 3:20 PM 

Age: 75 years old 

Clinical indication: Injury or trauma; Injury history: Poss fracture/drClari 

Request; Initial encounter; Blunt trauma (contusions or hematomas); Additional 

info: Dedicated CT of sacrum with thincuts 1 or 2 mm and 3d reconst 



TECHNIQUE: 

Imaging protocol: Computed tomography images of the pelvis without contrast. 

Exam focused on the skeletal structures.  3D reconstructions were processed.

Radiation optimization: All CT scans at this facility use at least one of these 

dose optimization techniques: automated exposure control; mA and/or kV 

adjustment per patient size (includes targeted exams where dose is matched to 

clinical indication); or iterative reconstruction. 



COMPARISON: 

CT Spine, lumbar w/o contrast 9/2/2020 1:59 AM 



FINDINGS: 

Bones/joints:  H-shaped sacral insufficiency fractures of both sacral ala with 

a transverse fracture at the S2 level.  The fractures are more conspicuous on 

the 3D reformatted images.  Osteopenia.

Soft tissues: Unremarkable. 



IMPRESSION: 

H-shaped sacral insufficiency fractures of both sacral ala with a transverse 

fracture at the S2 level.



Electronically signed by: Joon Mak On 09/17/2020  17:05:41 PM

## 2020-09-17 NOTE — REPVR
PROCEDURE INFORMATION: 

Exam: MR Pelvis Without Contrast, Sacrum 

Exam date and time: 9/17/2020 9:13 PM 

Age: 75 years old 

Clinical indication: Pain; Other: Back; Additional info: Mri of sacrum 



TECHNIQUE: 

Imaging protocol: Magnetic resonance images of the pelvis without intravenous 

contrast. 



COMPARISON: 

CT Pelvis without contrast 9/17/2020 11:09 AM 



FINDINGS: 

 There is a mildly buckled fracture of the S2 vertebra with extension to the 

sacral alae. There is moderate associated bone marrow and soft tissue edema. 

Alignment is anatomic. There are no erosive or destructive changes. No lytic or 

blastic lesion is seen. 



IMPRESSION: 

Sacral fracture, as described above 



Electronically signed by: Jitendra Donnelly On 09/17/2020  21:47:26 PM

## 2020-09-18 VITALS — SYSTOLIC BLOOD PRESSURE: 138 MMHG | DIASTOLIC BLOOD PRESSURE: 69 MMHG

## 2020-09-18 LAB
BUN SERPL-MCNC: 23 MG/DL (ref 7–18)
CALCIUM SERPL-MCNC: 8.7 MG/DL (ref 8.8–10.2)
CHLORIDE SERPL-SCNC: 117 MEQ/L (ref 98–107)
CO2 SERPL-SCNC: 27 MEQ/L (ref 21–32)
CREAT SERPL-MCNC: 1.1 MG/DL (ref 0.55–1.3)
GFR SERPL CREATININE-BSD FRML MDRD: 51.7 ML/MIN/{1.73_M2} (ref 39–?)
GLUCOSE SERPL-MCNC: 93 MG/DL (ref 70–100)
HCT VFR BLD AUTO: 32 % (ref 36–47)
HGB BLD-MCNC: 10.4 G/DL (ref 12–15.5)
MCH RBC QN AUTO: 33.1 PG (ref 27–33)
MCHC RBC AUTO-ENTMCNC: 32.5 G/DL (ref 32–36.5)
MCV RBC AUTO: 101.9 FL (ref 80–96)
PLATELET # BLD AUTO: 279 10^3/UL (ref 150–450)
POTASSIUM SERPL-SCNC: 4.4 MEQ/L (ref 3.5–5.1)
RBC # BLD AUTO: 3.14 10^6/UL (ref 4–5.4)
SODIUM SERPL-SCNC: 149 MEQ/L (ref 136–145)
WBC # BLD AUTO: 6.7 10^3/UL (ref 4–10)

## 2020-09-18 RX ADMIN — ENOXAPARIN SODIUM SCH MG: 40 INJECTION SUBCUTANEOUS at 10:04

## 2020-09-18 RX ADMIN — Medication SCH DOSE: at 10:05

## 2020-09-18 RX ADMIN — MAGNESIUM HYDROXIDE PRN ML: 400 SUSPENSION ORAL at 10:04

## 2020-09-18 RX ADMIN — ASPIRIN SCH MG: 81 TABLET ORAL at 20:09

## 2020-09-18 RX ADMIN — HYDRALAZINE HYDROCHLORIDE SCH MG: 50 TABLET, FILM COATED ORAL at 16:30

## 2020-09-18 RX ADMIN — SODIUM CHLORIDE SCH MLS/HR: 9 INJECTION, SOLUTION INTRAVENOUS at 10:57

## 2020-09-18 RX ADMIN — POLYETHYLENE GLYCOL 3350 PRN PKT: 17 POWDER, FOR SOLUTION ORAL at 10:04

## 2020-09-18 RX ADMIN — Medication SCH MG: at 20:08

## 2020-09-18 RX ADMIN — Medication SCH DOSE: at 20:09

## 2020-09-18 RX ADMIN — BACLOFEN SCH MG: 10 TABLET ORAL at 20:08

## 2020-09-18 RX ADMIN — HYDRALAZINE HYDROCHLORIDE SCH MG: 50 TABLET, FILM COATED ORAL at 20:09

## 2020-09-18 RX ADMIN — LOSARTAN POTASSIUM SCH MG: 50 TABLET, FILM COATED ORAL at 10:05

## 2020-09-18 RX ADMIN — SODIUM CHLORIDE SCH MLS/HR: 9 INJECTION, SOLUTION INTRAVENOUS at 16:32

## 2020-09-18 RX ADMIN — Medication SCH MG: at 10:04

## 2020-09-18 RX ADMIN — HYDRALAZINE HYDROCHLORIDE SCH MG: 50 TABLET, FILM COATED ORAL at 10:05

## 2020-09-18 RX ADMIN — NEBIVOLOL HYDROCHLORIDE SCH MG: 5 TABLET ORAL at 10:04

## 2020-09-18 RX ADMIN — MULTIPLE VITAMINS W/ MINERALS TAB SCH TAB: TAB at 20:09

## 2020-09-18 RX ADMIN — PROBIOTIC PRODUCT - TAB SCH EA: TAB at 10:05

## 2020-09-18 NOTE — IPNPDOC
Date Seen


The patient was seen on 9/18/20.





Progress Note


Subjective: No any acute events overnight. Having increased pain on L pelvis, 

also concern per PT. Patient was unable to participate in PT due to pain when 

standing.





Physical Examination





Vitals: As below


General: Laying in bed in no acute distress. 


Chest Exam: CTAB


Heart Exam: normal S1, S2, RRR


Abdomen Exam: Normal bowel sounds, Soft and nontender


Extremity Exam: Normal pulses, No edema. 


Skin Exam: Nl turgor and temperature


Rectal: minimal voluntary rectal tone on CHAU.


MS: tenderness along the lumbar spine going into the sacrum. She has 5/5 

strength in the left upper extremity, strength as 4/5 in the right lower 

extremity, 4/5 in right upper extremity.


Extremities: No swelling, no cyanosis


Neuro: right sided weakness





PVRs performed - 440 cc, 220 cc. Will continue scheduled PVR q6h





Labs and radiology: reviewed





A/P


75-year-old female who has chronic right-sided weakness secondary to multiple 

sclerosis (MS), who fell backward as she was walking up a step. CT scan showed  

acute, nondisplaced fracture of the right sacral ala and an acute nondisplaced 

fracture through the S2 vertebral body. There is possible deformity through the 

left iliac wing. Today, patient is unable to participate with PT due to pelvic 

pain on standing/sitting. Further, ANTONIETA noted likely 2/2 diclofenac patch use. 

Will change from ALC to inpatient status. 





Multiple sclerosis exacerbation


-with Chronic right sided weakness.


-completed course of Solu Medrol 09/01 to 09/05


-MRI Brain: Stable size and appearance of prominent T2/FLAIR hyperintense white 

matter lesions along the -periventricular white matter and corpus callosum, 

compatible  with MS


-Patient is on Avonex as outpatient. It is OK to hold Avonex for 2 months . She 

being in her 70s her immune system is already suppressed so minimal chance of 

flare. If flare occurs while in rehab she can get solumedrol. Discussed with Dr Guerra 


-f/u with neuro in the outptatient settings





Fall


- R sacral fx, S2 vertebral body fx


-  was seen by ortho service on 9/3/20, plan for conservative therapy, WBAT, PT 

and to f/u ortho clinic 1 week for repeat xrays


- patient unable to participate in PT due to pain


- spoke to ortho service - repeated lumbar spine CT, pelvis MRI. Images reviewed

by Dr. Bentley. H shaped sacral insufficiency fractures of both sacral ala with a 

transverse fx at S2 level. Given significant osteoporosis, patient not a 

surgical candidate at this time. Recommends bed to chair activity, no active 

PT/weight bearing at this time. 


- patient will be seen by ortho service


-she will likely be a candidate for Denosumab which can be started by her PCP / 

in the meanwhile will start Ca w -Vitamin D supplements





Urinary retention


- patient retaining urine, PVRs 440 cc, 220cc


- c/w PVRs q6h


- voluntary rectal tone minimal (in setting of MS)


- obtain lumbar spine MRI wo contrast to ro disc herniation





ANTONIETA on CKD-III


-resolved


- DC IVF


 


UTI (urinary tract infection)


-UA positive for E.coli


-completed course of levofloxacin.





HTN


-BP under control


-added Norvasc


-increased hydralazine to TID





Leucocytosis


- resolved


-Most likely due to IV steroids for MS flare


-Pt afebrile, denies chills. BP stable





Other medical Problems.


Raynauds 


CHF unspecified type


Chronic CAD / hx of MI


PAD with Right sided CEA





VS, I&O, 24H, Fishbone


Vital Signs/I&O





Vital Signs








  Date Time  Temp Pulse Resp B/P (MAP) Pulse Ox O2 Delivery O2 Flow Rate FiO2


 


9/18/20 10:04  65  149/69    


 


9/18/20 06:00 97.8  18  98 Room Air  














I&O- Last 24 Hours up to 6 AM 


 


 9/18/20





 06:00


 


Intake Total 3080 ml


 


Output Total 0 ml


 


Balance 3080 ml











Laboratory Data


24H LABS


Laboratory Tests 2


9/18/20 13:01: 


Nucleated Red Blood Cells % (auto) 0.0, Anion Gap 5L, Glomerular Filtration Rate

51.7, Calcium Level 8.7L


CBC/BMP


Laboratory Tests


9/18/20 13:01

















HERB FRANK MD       Sep 18, 2020 14:34

## 2020-09-19 VITALS — SYSTOLIC BLOOD PRESSURE: 180 MMHG | DIASTOLIC BLOOD PRESSURE: 82 MMHG

## 2020-09-19 VITALS — DIASTOLIC BLOOD PRESSURE: 57 MMHG | SYSTOLIC BLOOD PRESSURE: 130 MMHG

## 2020-09-19 VITALS — DIASTOLIC BLOOD PRESSURE: 88 MMHG | SYSTOLIC BLOOD PRESSURE: 180 MMHG

## 2020-09-19 VITALS — DIASTOLIC BLOOD PRESSURE: 71 MMHG | SYSTOLIC BLOOD PRESSURE: 141 MMHG

## 2020-09-19 VITALS — DIASTOLIC BLOOD PRESSURE: 87 MMHG | SYSTOLIC BLOOD PRESSURE: 157 MMHG

## 2020-09-19 VITALS — SYSTOLIC BLOOD PRESSURE: 156 MMHG | DIASTOLIC BLOOD PRESSURE: 72 MMHG

## 2020-09-19 RX ADMIN — SODIUM CHLORIDE SCH MLS/HR: 9 INJECTION, SOLUTION INTRAVENOUS at 08:36

## 2020-09-19 RX ADMIN — HYDRALAZINE HYDROCHLORIDE SCH MG: 50 TABLET, FILM COATED ORAL at 17:00

## 2020-09-19 RX ADMIN — HYDRALAZINE HYDROCHLORIDE SCH MG: 50 TABLET, FILM COATED ORAL at 19:01

## 2020-09-19 RX ADMIN — MULTIPLE VITAMINS W/ MINERALS TAB SCH TAB: TAB at 20:02

## 2020-09-19 RX ADMIN — PROBIOTIC PRODUCT - TAB SCH EA: TAB at 08:36

## 2020-09-19 RX ADMIN — BACLOFEN SCH MG: 10 TABLET ORAL at 20:02

## 2020-09-19 RX ADMIN — Medication SCH DOSE: at 20:03

## 2020-09-19 RX ADMIN — Medication SCH MG: at 08:37

## 2020-09-19 RX ADMIN — POLYETHYLENE GLYCOL 3350 PRN PKT: 17 POWDER, FOR SOLUTION ORAL at 08:36

## 2020-09-19 RX ADMIN — ASPIRIN SCH MG: 81 TABLET ORAL at 20:02

## 2020-09-19 RX ADMIN — SODIUM CHLORIDE SCH MLS/HR: 9 INJECTION, SOLUTION INTRAVENOUS at 00:43

## 2020-09-19 RX ADMIN — ENOXAPARIN SODIUM SCH MG: 40 INJECTION SUBCUTANEOUS at 08:36

## 2020-09-19 RX ADMIN — NEBIVOLOL HYDROCHLORIDE SCH MG: 5 TABLET ORAL at 08:40

## 2020-09-19 RX ADMIN — MAGNESIUM HYDROXIDE PRN ML: 400 SUSPENSION ORAL at 08:36

## 2020-09-19 RX ADMIN — SODIUM CHLORIDE SCH MLS/HR: 9 INJECTION, SOLUTION INTRAVENOUS at 15:30

## 2020-09-19 RX ADMIN — Medication SCH MG: at 20:02

## 2020-09-19 RX ADMIN — HYDRALAZINE HYDROCHLORIDE SCH MG: 50 TABLET, FILM COATED ORAL at 08:40

## 2020-09-19 RX ADMIN — Medication SCH DOSE: at 08:41

## 2020-09-19 RX ADMIN — LOSARTAN POTASSIUM SCH MG: 50 TABLET, FILM COATED ORAL at 08:40

## 2020-09-19 NOTE — REPVR
PROCEDURE INFORMATION: 

Exam: MR Lumbar Spine Without Contrast. 

Exam date and time: 9/19/2020 10:37 AM 

Age: 75 years old 

Clinical indication: Low back pain; Patient HX: Lbp known sacral FX; Additional 

info: R/O disc prolapse 



TECHNIQUE: 

Imaging protocol: Multiplanar magnetic resonance images of the lumbar spine 

without intravenous contrast. 



COMPARISON: 

1. CT Spine, lumbar w/o contrast 9/2/2020 1:59 AM 

2. MRI PELVIS WITHOUT CONTRAST 9/17/2020 8:33:43 PM 



FINDINGS: 

Vertebrae: Examination again demonstrates bilateral sacral insufficiency 

fractures as well as a fracture involving the S2 vertebral body. The lumbar 

vertebral bodies are normal in height,signal intensity and alignment.No acute 

fracture or dislocation is seen. 

Spinal epidural space: There is no evidence of epidural masses or hemorrhage. 

Spinal cord: The conus medullaris is normal. The cauda equina nerve roots 

demonstrate no crowding or displacement. 

L1-L2: There is no significant degenerative disc herniation.The spinal canal 

and neural foramina are patent and without significant stenosis. 

L2-L3: There is no significant degenerative disc herniation.The spinal canal 

and neural foramina are patent and without significant stenosis. 

L3-L4: There is no significant degenerative disc herniation.The spinal canal 

and neural foramina are patent and without significant stenosis. 

L4-L5: There is no significant degenerative disc herniation.The spinal canal 

and neural foramina are patent and without significant stenosis. 

L5-S1: Small diffuse posterior herniation. Mild facet arthropathy.There is no 

evidence of spinal canal narrowing.There is mild bilateral foraminal stenosis.  

Soft tissues: The prevertebral soft tissues appear normal. 



IMPRESSION: 

1. MRI of the lumbar spine reveals mild degenerative spondylitic changes and 

degenerative disc disease at L5-S1 level as described above. 

2. Examination again demonstrates bilateral sacral insufficiency fractures as 

well as a fracture involving the S2 vertebral body. 



Electronically signed by: Amaury Sagastume On 09/19/2020  13:40:14 PM

## 2020-09-19 NOTE — IPNPDOC
Date Seen


The patient was seen on 9/19/20.





Progress Note


Subjective: No any acute events overnight. Comfortable at rest. Patient was 

unable to participate in PT due to pain when standing.





Physical Examination





Vitals: As below


General: Laying in bed in no acute distress. 


Chest Exam: CTAB


Heart Exam: normal S1, S2, RRR


Abdomen Exam: Normal bowel sounds, Soft and nontender


Extremity Exam: Normal pulses, No edema. 


Skin Exam: Nl turgor and temperature


Rectal: minimal voluntary rectal tone on CHAU.


MS: tenderness along the lumbar spine going into the sacrum. She has 5/5 

strength in the left upper extremity, strength as 4/5 in the right lower extrem

ity, 4/5 in right upper extremity.


Extremities: No swelling, no cyanosis


Neuro: right sided weakness





PVRs performed - 440 cc, 220 cc, 320, 160. Will continue scheduled PVR q6h





Labs and radiology: reviewed





A/P


75-year-old female who has chronic right-sided weakness secondary to multiple 

sclerosis (MS), who fell backward as she was walking up a step. CT scan showed  

acute, nondisplaced fracture of the right sacral ala and an acute nondisplaced 

fracture through the S2 vertebral body. There is possible deformity through the 

left iliac wing. Today, patient is unable to participate with PT due to pelvic 

pain on standing/sitting. Further, ANTONIETA noted likely 2/2 diclofenac patch use. 

Will change from ALC to inpatient status. 





Multiple sclerosis exacerbation


-with Chronic right sided weakness.


-completed course of Solu Medrol 09/01 to 09/05


-MRI Brain: Stable size and appearance of prominent T2/FLAIR hyperintense white 

matter lesions along the -periventricular white matter and corpus callosum, 

compatible  with MS


-Patient is on Avonex as outpatient. It is OK to hold Avonex for 2 months . She 

being in her 70s her immune system is already suppressed so minimal chance of 

flare. If flare occurs while in rehab she can get solumedrol. Discussed with Dr Guerra 


-f/u with neuro in the outpatient setting





Fall


- R sacral fx, S2 vertebral body fx


-  was seen by ortho service on 9/3/20, plan for conservative therapy, WBAT, PT 

and to f/u ortho clinic 1 week for repeat xrays


- patient unable to participate in PT due to pain


- spoke to ortho service - repeated lumbar spine CT, pelvis MRI. Images reviewed

by Dr. Bentley. H shaped sacral insufficiency fractures of both sacral ala with a 

transverse fx at S2 level. Given significant osteoporosis, patient not a 

surgical candidate at this time. Recommends bed to chair activity, no active 

PT/weight bearing at this time. 


- patient will be seen by ortho service


-she will likely be a candidate for Denosumab which can be started by her PCP / 

in the meanwhile will start Ca w -Vitamin D supplements





Urinary retention


- patient retaining urine, PVRs 440 cc, 220cc, 330


- c/w PVRs q6h


- voluntary rectal tone minimal (in setting of MS)


- obtain lumbar spine MRI wo contrast to r/o disc herniation





ANTONIETA on CKD-III


-resolved


- DC IVF


 


UTI (urinary tract infection)


-UA positive for E.coli


-completed course of levofloxacin.





HTN


-BP under control


-added Norvasc


-increased hydralazine to TID





Leucocytosis


- resolved


-Most likely due to IV steroids for MS flare


-Pt afebrile, denies chills. BP stable





Other medical Problems.


Raynauds 


CHF unspecified type


Chronic CAD / hx of MI


PAD with Right sided CEA





VS, I&O, 24H, Fishbone


Vital Signs/I&O





Vital Signs








  Date Time  Temp Pulse Resp B/P (MAP) Pulse Ox O2 Delivery O2 Flow Rate FiO2


 


9/19/20 08:40  72      


 


9/19/20 08:40    172/88    


 


9/19/20 06:00 98.0  18  94 Room Air  














I&O- Last 24 Hours up to 6 AM 


 


 9/19/20





 06:00


 


Intake Total 2510 ml


 


Output Total 25 ml


 


Balance 2485 ml











Laboratory Data


24H LABS


Laboratory Tests 2


9/18/20 13:01: 


Nucleated Red Blood Cells % (auto) 0.0, Anion Gap 5L, Glomerular Filtration Rate

51.7, Calcium Level 8.7L


CBC/BMP


Laboratory Tests


9/18/20 13:01

















HERB FRANK MD       Sep 19, 2020 12:50

## 2020-09-20 VITALS — DIASTOLIC BLOOD PRESSURE: 64 MMHG | SYSTOLIC BLOOD PRESSURE: 132 MMHG

## 2020-09-20 VITALS — DIASTOLIC BLOOD PRESSURE: 71 MMHG | SYSTOLIC BLOOD PRESSURE: 141 MMHG

## 2020-09-20 VITALS — DIASTOLIC BLOOD PRESSURE: 80 MMHG | SYSTOLIC BLOOD PRESSURE: 159 MMHG

## 2020-09-20 LAB
BASOPHILS # BLD AUTO: 0 10^3/UL (ref 0–0.2)
BASOPHILS NFR BLD AUTO: 0.3 % (ref 0–1)
BUN SERPL-MCNC: 16 MG/DL (ref 7–18)
CALCIUM SERPL-MCNC: 8.6 MG/DL (ref 8.8–10.2)
CHLORIDE SERPL-SCNC: 113 MEQ/L (ref 98–107)
CO2 SERPL-SCNC: 23 MEQ/L (ref 21–32)
CREAT SERPL-MCNC: 1.13 MG/DL (ref 0.55–1.3)
EOSINOPHIL # BLD AUTO: 0.2 10^3/UL (ref 0–0.5)
EOSINOPHIL NFR BLD AUTO: 1.5 % (ref 0–3)
GFR SERPL CREATININE-BSD FRML MDRD: 50.1 ML/MIN/{1.73_M2} (ref 39–?)
GLUCOSE SERPL-MCNC: 179 MG/DL (ref 70–100)
HCT VFR BLD AUTO: 34.5 % (ref 36–47)
HGB BLD-MCNC: 10.9 G/DL (ref 12–15.5)
LYMPHOCYTES # BLD AUTO: 1.1 10^3/UL (ref 1.5–5)
LYMPHOCYTES NFR BLD AUTO: 10.2 % (ref 24–44)
MAGNESIUM SERPL-MCNC: 2.3 MG/DL (ref 1.8–2.4)
MCH RBC QN AUTO: 32.3 PG (ref 27–33)
MCHC RBC AUTO-ENTMCNC: 31.6 G/DL (ref 32–36.5)
MCV RBC AUTO: 102.4 FL (ref 80–96)
MONOCYTES # BLD AUTO: 0.7 10^3/UL (ref 0–0.8)
MONOCYTES NFR BLD AUTO: 6.4 % (ref 0–5)
NEUTROPHILS # BLD AUTO: 8.4 10^3/UL (ref 1.5–8.5)
NEUTROPHILS NFR BLD AUTO: 81.1 % (ref 36–66)
PLATELET # BLD AUTO: 265 10^3/UL (ref 150–450)
POTASSIUM SERPL-SCNC: 4.2 MEQ/L (ref 3.5–5.1)
RBC # BLD AUTO: 3.37 10^6/UL (ref 4–5.4)
SODIUM SERPL-SCNC: 143 MEQ/L (ref 136–145)
WBC # BLD AUTO: 10.3 10^3/UL (ref 4–10)

## 2020-09-20 RX ADMIN — Medication SCH DOSE: at 09:20

## 2020-09-20 RX ADMIN — Medication SCH MG: at 20:10

## 2020-09-20 RX ADMIN — HYDRALAZINE HYDROCHLORIDE SCH MG: 50 TABLET, FILM COATED ORAL at 20:11

## 2020-09-20 RX ADMIN — ENOXAPARIN SODIUM SCH MG: 40 INJECTION SUBCUTANEOUS at 09:19

## 2020-09-20 RX ADMIN — PROBIOTIC PRODUCT - TAB SCH EA: TAB at 09:19

## 2020-09-20 RX ADMIN — ASPIRIN SCH MG: 81 TABLET ORAL at 20:11

## 2020-09-20 RX ADMIN — MULTIPLE VITAMINS W/ MINERALS TAB SCH TAB: TAB at 20:10

## 2020-09-20 RX ADMIN — NEBIVOLOL HYDROCHLORIDE SCH MG: 5 TABLET ORAL at 09:23

## 2020-09-20 RX ADMIN — HYDRALAZINE HYDROCHLORIDE SCH MG: 50 TABLET, FILM COATED ORAL at 16:21

## 2020-09-20 RX ADMIN — HYDRALAZINE HYDROCHLORIDE SCH MG: 50 TABLET, FILM COATED ORAL at 09:22

## 2020-09-20 RX ADMIN — Medication SCH DOSE: at 20:11

## 2020-09-20 RX ADMIN — LOSARTAN POTASSIUM SCH MG: 50 TABLET, FILM COATED ORAL at 09:22

## 2020-09-20 RX ADMIN — Medication SCH MG: at 09:19

## 2020-09-20 RX ADMIN — BACLOFEN SCH MG: 10 TABLET ORAL at 20:10

## 2020-09-20 NOTE — IPNPDOC
Date Seen


The patient was seen on 9/20/20.





Progress Note


Subjective: No any acute events overnight. Comfortable in bed. Denies chest 

pain, n/v/d. 





Physical Examination





Vitals: As below


General: Laying in bed in no acute distress. 


Chest Exam: CTAB


Heart Exam: normal S1, S2, RRR


Abdomen Exam: Normal bowel sounds, Soft and nontender


Extremity Exam: Normal pulses, No edema. 


Skin Exam: Nl turgor and temperature


Rectal: minimal voluntary rectal tone on CHAU.


MS: tenderness along the lumbar spine going into the sacrum. She has 5/5 

strength in the left upper extremity, strength as 4/5 in the right lower 

extremity, 4/5 in right upper extremity.


Extremities: No swelling, no cyanosis


Neuro: right sided weakness





Labs and radiology: reviewed





A/P


75-year-old female who has chronic right-sided weakness secondary to multiple 

sclerosis (MS), who fell backward as she was walking up a step. CT scan showed  

acute, nondisplaced fracture of the right sacral ala and an acute nondisplaced 

fracture through the S2 vertebral body. There is possible deformity through the 

left iliac wing. Today, patient is unable to participate with PT due to pelvic 

pain on standing/sitting. Further, ANTONIETA noted likely 2/2 diclofenac patch use. 

Patient was changed from ALC to inpatient status on 9/17/20 for workup of 

persistent lumbar back pain, ortho consulted, repeated imaging done. MRI lumbar 

spine showing no spinal stenosis.





Multiple sclerosis exacerbation


-with Chronic right sided weakness.


-completed course of Solu Medrol 09/01 to 09/05


-MRI Brain: Stable size and appearance of prominent T2/FLAIR hyperintense white 

matter lesions along the -periventricular white matter and corpus callosum, 

compatible  with MS


-Patient is on Avonex as outpatient. It is OK to hold Avonex for 2 months . She 

being in her 70s her immune system is already suppressed so minimal chance of 

flare. If flare occurs while in rehab she can get solumedrol. Discussed with Dr Guerra 


-f/u with neuro in the outpatient setting





Fall


- R sacral fx, S2 vertebral body fx


-  was seen by ortho service on 9/3/20, plan for conservative therapy, WBAT, PT 

and to f/u ortho clinic 1 week for repeat xrays


- patient unable to participate in PT due to pain


- spoke to ortho service - repeated lumbar spine CT, pelvis MRI. Images reviewed

by Dr. Bentley. H shaped sacral insufficiency fractures of both sacral ala with a 

transverse fx at S2 level. Given significant osteoporosis, patient not a 

surgical candidate at this time. Recommends bed to chair activity, no active 

PT/weight bearing at this time. 


- patient will be seen by ortho service


-she will likely be a candidate for Denosumab which can be started by her PCP / 

in the meanwhile will start Ca w -Vitamin D supplements





Urinary retention


- bladder scan 249 this morning


- can stop scans


- place external female catheter


- voluntary rectal tone minimal (in setting of MS)


- obtain lumbar spine MRI wo contrast to r/o disc herniation - no evidence 

spinal canal narrowing.





ANTONIETA on CKD-III


- resolved


- DC IVF


 


UTI (urinary tract infection)


-UA positive for E.coli


-completed course of levofloxacin.





HTN


- BP elevated


- losartan 100 mg POD


- nibovolol 5 mg daily


- added Norvasc 5 mg daily


- hydralazine 50 mg TID


- DC IVF





Leucocytosis


- mild bump in WBC, 10.3. No fevers.


- previously due to steroid for MS flare


- monitor





Other medical Problems.


Raynauds 


CHF unspecified type


Chronic CAD / hx of MI


PAD with Right sided CEA





VS, I&O, 24H, Fishbone


Vital Signs/I&O





Vital Signs








  Date Time  Temp Pulse Resp B/P (MAP) Pulse Ox O2 Delivery O2 Flow Rate FiO2


 


9/20/20 06:00 98.2 81 18 159/80 (106) 93 Room Air  














I&O- Last 24 Hours up to 6 AM 


 


 9/20/20





 05:59


 


Intake Total 2795 ml


 


Balance 2795 ml

















HERB FRANK MD       Sep 20, 2020 07:50

## 2020-09-21 VITALS — DIASTOLIC BLOOD PRESSURE: 74 MMHG | SYSTOLIC BLOOD PRESSURE: 144 MMHG

## 2020-09-21 VITALS — DIASTOLIC BLOOD PRESSURE: 93 MMHG | SYSTOLIC BLOOD PRESSURE: 141 MMHG

## 2020-09-21 VITALS — DIASTOLIC BLOOD PRESSURE: 54 MMHG | SYSTOLIC BLOOD PRESSURE: 154 MMHG

## 2020-09-21 LAB
BUN SERPL-MCNC: 18 MG/DL (ref 7–18)
CALCIUM SERPL-MCNC: 8.9 MG/DL (ref 8.8–10.2)
CHLORIDE SERPL-SCNC: 112 MEQ/L (ref 98–107)
CO2 SERPL-SCNC: 26 MEQ/L (ref 21–32)
CREAT SERPL-MCNC: 1.17 MG/DL (ref 0.55–1.3)
GFR SERPL CREATININE-BSD FRML MDRD: 48.1 ML/MIN/{1.73_M2} (ref 39–?)
GLUCOSE SERPL-MCNC: 91 MG/DL (ref 70–100)
HCT VFR BLD AUTO: 32.4 % (ref 36–47)
HGB BLD-MCNC: 10.8 G/DL (ref 12–15.5)
MCH RBC QN AUTO: 33.2 PG (ref 27–33)
MCHC RBC AUTO-ENTMCNC: 33.3 G/DL (ref 32–36.5)
MCV RBC AUTO: 99.7 FL (ref 80–96)
PLATELET # BLD AUTO: 257 10^3/UL (ref 150–450)
POTASSIUM SERPL-SCNC: 4.3 MEQ/L (ref 3.5–5.1)
RBC # BLD AUTO: 3.25 10^6/UL (ref 4–5.4)
SODIUM SERPL-SCNC: 142 MEQ/L (ref 136–145)
WBC # BLD AUTO: 6.4 10^3/UL (ref 4–10)

## 2020-09-21 RX ADMIN — PROBIOTIC PRODUCT - TAB SCH EA: TAB at 08:07

## 2020-09-21 RX ADMIN — NEBIVOLOL HYDROCHLORIDE SCH MG: 5 TABLET ORAL at 10:20

## 2020-09-21 RX ADMIN — LOSARTAN POTASSIUM SCH MG: 50 TABLET, FILM COATED ORAL at 08:09

## 2020-09-21 RX ADMIN — HYDRALAZINE HYDROCHLORIDE SCH MG: 50 TABLET, FILM COATED ORAL at 08:08

## 2020-09-21 RX ADMIN — ENOXAPARIN SODIUM SCH MG: 40 INJECTION SUBCUTANEOUS at 08:04

## 2020-09-21 RX ADMIN — ASPIRIN SCH MG: 81 TABLET ORAL at 21:22

## 2020-09-21 RX ADMIN — Medication SCH DOSE: at 08:04

## 2020-09-21 RX ADMIN — HYDRALAZINE HYDROCHLORIDE SCH MG: 50 TABLET, FILM COATED ORAL at 16:32

## 2020-09-21 RX ADMIN — BACLOFEN SCH MG: 10 TABLET ORAL at 21:22

## 2020-09-21 RX ADMIN — Medication SCH MG: at 21:22

## 2020-09-21 RX ADMIN — MULTIPLE VITAMINS W/ MINERALS TAB SCH TAB: TAB at 21:22

## 2020-09-21 RX ADMIN — HYDRALAZINE HYDROCHLORIDE SCH MG: 50 TABLET, FILM COATED ORAL at 21:22

## 2020-09-21 RX ADMIN — Medication SCH MG: at 08:09

## 2020-09-21 RX ADMIN — Medication SCH DOSE: at 21:24

## 2020-09-21 NOTE — IPNPDOC
Date Seen


The patient was seen on 9/21/20.





Progress Note


Subjective: No any acute events overnight. Comfortable in bed. Denies chest 

pain, n/v/d. 





Physical Examination





Vitals: As below


General: Laying in bed in no acute distress. 


Chest Exam: CTAB


Heart Exam: normal S1, S2, RRR


Abdomen Exam: Normal bowel sounds, Soft and nontender


Extremity Exam: Normal pulses, No edema. 


Skin Exam: Nl turgor and temperature


MS: tenderness along the lumbar spine going into the sacrum. She has 5/5 

strength in the left upper extremity, strength as 4/5 in the right lower 

extremity, 4/5 in right upper extremity.


Extremities: No swelling, no cyanosis


Neuro: right sided weakness





Labs and radiology: reviewed





A/P


75-year-old female who has chronic right-sided weakness secondary to multiple 

sclerosis (MS), who fell backward as she was walking up a step. CT scan showed  

acute, nondisplaced fracture of the right sacral ala and an acute nondisplaced 

fracture through the S2 vertebral body. There is possible deformity through the 

left iliac wing. Today, patient is unable to participate with PT due to pelvic 

pain on standing/sitting. Further, ANTONIETA noted likely 2/2 diclofenac patch use. 

Patient was changed from ALC to inpatient status on 9/17/20 for workup of per

sistent lumbar back pain, ortho consulted, repeated imaging done. MRI lumbar 

spine showing no spinal stenosis.





Multiple sclerosis exacerbation


-with Chronic right sided weakness.


-completed course of Solu Medrol 09/01 to 09/05


-MRI Brain: Stable size and appearance of prominent T2/FLAIR hyperintense white 

matter lesions along the -periventricular white matter and corpus callosum, 

compatible  with MS


-Patient is on Avonex as outpatient. It is OK to hold Avonex for 2 months . She 

being in her 70s her immune system is already suppressed so minimal chance of 

flare. If flare occurs while in rehab she can get solumedrol. Discussed with Dr Guerra 


-f/u with neuro in the outpatient setting





Fall


- R sacral fx, S2 vertebral body fx


-  was seen by ortho service on 9/3/20, plan for conservative therapy, WBAT, PT 

and to f/u ortho clinic 1 week for repeat xrays


- patient unable to participate in PT due to pain


- spoke to ortho service - repeated lumbar spine CT, pelvis MRI. Images reviewed

by Dr. Bentley. H shaped sacral insufficiency fractures of both sacral ala with a 

transverse fx at S2 level. Given significant osteoporosis, patient not a 

surgical candidate at this time. Recommends bed to chair activity, no active 

PT/weight bearing at this time.


- clarified mobility recs with ortho: no weight bearing at this time. transfer 

to chair using lift/sliding board. Upper body PT conditioning only


- attempted to update  [place] today, however unable to reach by phone.


-she will likely be a candidate for Denosumab which can be started by her PCP / 

in the meanwhile will start Ca w -Vitamin D supplements





Urinary retention


- bladder scan 249 this morning


- can stop scans


- place external female catheter


- voluntary rectal tone minimal (in setting of MS)


- obtain lumbar spine MRI wo contrast to r/o disc herniation - no evidence 

spinal canal narrowing.





ANTONIETA on CKD-III


- resolved


- DC IVF


 


UTI (urinary tract infection)


-UA positive for E.coli


-completed course of levofloxacin.





HTN


- BP elevated


- losartan 100 mg POD


- nibovolol 5 mg daily


- added Norvasc 5 mg daily


- hydralazine 50 mg TID


- DC IVF





Leucocytosis


- resolved


- previously due to steroid for MS flare


- monitor





Other medical Problems.


Raynauds 


CHF unspecified type


Chronic CAD / hx of MI


PAD with Right sided CEA





VS, I&O, 24H, Fishbone


Vital Signs/I&O





Vital Signs








  Date Time  Temp Pulse Resp B/P (MAP) Pulse Ox O2 Delivery O2 Flow Rate FiO2


 


9/21/20 13:07   18     


 


9/21/20 12:37      Room Air  


 


9/21/20 10:20  71  129/58    


 


9/21/20 06:00 98.0    96   














I&O- Last 24 Hours up to 6 AM 


 


 9/21/20





 06:00


 


Intake Total 780 ml


 


Output Total 100 ml


 


Balance 680 ml











Laboratory Data


24H LABS


Laboratory Tests 2


9/21/20 07:13: 


Nucleated Red Blood Cells % (auto) 0.0, Anion Gap 4L, Glomerular Filtration Rate

48.1, Calcium Level 8.9


CBC/BMP


Laboratory Tests


9/21/20 07:13

















HERB FRANK MD       Sep 21, 2020 13:49

## 2020-09-22 VITALS — SYSTOLIC BLOOD PRESSURE: 126 MMHG | DIASTOLIC BLOOD PRESSURE: 61 MMHG

## 2020-09-22 RX ADMIN — MULTIPLE VITAMINS W/ MINERALS TAB SCH TAB: TAB at 20:29

## 2020-09-22 RX ADMIN — HYDRALAZINE HYDROCHLORIDE SCH MG: 50 TABLET, FILM COATED ORAL at 09:50

## 2020-09-22 RX ADMIN — HYDRALAZINE HYDROCHLORIDE SCH MG: 50 TABLET, FILM COATED ORAL at 15:20

## 2020-09-22 RX ADMIN — Medication SCH DOSE: at 09:50

## 2020-09-22 RX ADMIN — ENOXAPARIN SODIUM SCH MG: 40 INJECTION SUBCUTANEOUS at 09:45

## 2020-09-22 RX ADMIN — BACLOFEN SCH MG: 10 TABLET ORAL at 20:29

## 2020-09-22 RX ADMIN — Medication SCH DOSE: at 20:30

## 2020-09-22 RX ADMIN — Medication SCH MG: at 20:29

## 2020-09-22 RX ADMIN — ASPIRIN SCH MG: 81 TABLET ORAL at 20:29

## 2020-09-22 RX ADMIN — NEBIVOLOL HYDROCHLORIDE SCH MG: 5 TABLET ORAL at 15:20

## 2020-09-22 RX ADMIN — PROBIOTIC PRODUCT - TAB SCH EA: TAB at 09:49

## 2020-09-22 RX ADMIN — LOSARTAN POTASSIUM SCH MG: 50 TABLET, FILM COATED ORAL at 09:49

## 2020-09-22 RX ADMIN — ACETAMINOPHEN PRN MG: 325 TABLET ORAL at 20:37

## 2020-09-22 RX ADMIN — Medication SCH MG: at 09:48

## 2020-09-22 RX ADMIN — HYDRALAZINE HYDROCHLORIDE SCH MG: 50 TABLET, FILM COATED ORAL at 20:31

## 2020-09-23 VITALS — DIASTOLIC BLOOD PRESSURE: 58 MMHG | SYSTOLIC BLOOD PRESSURE: 147 MMHG

## 2020-09-23 RX ADMIN — PROBIOTIC PRODUCT - TAB SCH EA: TAB at 09:15

## 2020-09-23 RX ADMIN — ENOXAPARIN SODIUM SCH MG: 40 INJECTION SUBCUTANEOUS at 09:15

## 2020-09-23 RX ADMIN — MULTIPLE VITAMINS W/ MINERALS TAB SCH TAB: TAB at 19:59

## 2020-09-23 RX ADMIN — Medication SCH MG: at 20:00

## 2020-09-23 RX ADMIN — ASPIRIN SCH MG: 81 TABLET ORAL at 20:00

## 2020-09-23 RX ADMIN — HYDRALAZINE HYDROCHLORIDE SCH MG: 50 TABLET, FILM COATED ORAL at 09:16

## 2020-09-23 RX ADMIN — HYDRALAZINE HYDROCHLORIDE SCH MG: 50 TABLET, FILM COATED ORAL at 16:57

## 2020-09-23 RX ADMIN — Medication SCH MG: at 09:16

## 2020-09-23 RX ADMIN — HYDRALAZINE HYDROCHLORIDE SCH MG: 50 TABLET, FILM COATED ORAL at 20:01

## 2020-09-23 RX ADMIN — Medication SCH DOSE: at 19:59

## 2020-09-23 RX ADMIN — Medication SCH DOSE: at 09:17

## 2020-09-23 RX ADMIN — ACETAMINOPHEN PRN MG: 325 TABLET ORAL at 19:59

## 2020-09-23 RX ADMIN — LOSARTAN POTASSIUM SCH MG: 50 TABLET, FILM COATED ORAL at 09:16

## 2020-09-23 RX ADMIN — BACLOFEN SCH MG: 10 TABLET ORAL at 20:00

## 2020-09-23 RX ADMIN — NEBIVOLOL HYDROCHLORIDE SCH MG: 5 TABLET ORAL at 09:16

## 2020-09-23 NOTE — ECGEPIP
Test Date:    2020

Pat Name:     PRASANTH SYED            Department:   

Patient ID:   [id number]                 Room:         -32

Gender:       Female                   Technician:   

:          [id number]               Requested By: DIOR STUBBS 

Order Number: [id number]     Reading MD:   Feliciano Salinas

                                 Measurements

Intervals                              Axis          

Rate:         61                       P:            62

NM:           139                      QRS:          47

QRSD:         85                       T:            67

QT:           424                                    

QTc:          428                                    

                           Interpretive Statements

NORMAL SINUS RHYTHM

PRIOR ANTEROSEPTAL INFARCT

WITH ST/T ABNORMALITY

CLINICAL CORRELATION REQUIRED

SEE SCANNED DOWNTIME REPORT.

## 2020-09-24 VITALS — SYSTOLIC BLOOD PRESSURE: 100 MMHG | DIASTOLIC BLOOD PRESSURE: 66 MMHG

## 2020-09-24 LAB
BUN SERPL-MCNC: 24 MG/DL (ref 7–18)
CALCIUM SERPL-MCNC: 9.6 MG/DL (ref 8.8–10.2)
CHLORIDE SERPL-SCNC: 111 MEQ/L (ref 98–107)
CO2 SERPL-SCNC: 26 MEQ/L (ref 21–32)
CREAT SERPL-MCNC: 1.22 MG/DL (ref 0.55–1.3)
GFR SERPL CREATININE-BSD FRML MDRD: 45.9 ML/MIN/{1.73_M2} (ref 39–?)
GLUCOSE SERPL-MCNC: 91 MG/DL (ref 70–100)
HCT VFR BLD AUTO: 37.1 % (ref 36–47)
HGB BLD-MCNC: 11.7 G/DL (ref 12–15.5)
MCH RBC QN AUTO: 32.3 PG (ref 27–33)
MCHC RBC AUTO-ENTMCNC: 31.5 G/DL (ref 32–36.5)
MCV RBC AUTO: 102.5 FL (ref 80–96)
PLATELET # BLD AUTO: 376 10^3/UL (ref 150–450)
POTASSIUM SERPL-SCNC: 4.7 MEQ/L (ref 3.5–5.1)
RBC # BLD AUTO: 3.62 10^6/UL (ref 4–5.4)
SODIUM SERPL-SCNC: 144 MEQ/L (ref 136–145)
WBC # BLD AUTO: 5.8 10^3/UL (ref 4–10)

## 2020-09-24 RX ADMIN — MULTIPLE VITAMINS W/ MINERALS TAB SCH TAB: TAB at 21:08

## 2020-09-24 RX ADMIN — BACLOFEN SCH MG: 10 TABLET ORAL at 21:07

## 2020-09-24 RX ADMIN — Medication SCH DOSE: at 21:08

## 2020-09-24 RX ADMIN — ASPIRIN SCH MG: 81 TABLET ORAL at 21:08

## 2020-09-24 RX ADMIN — Medication SCH MG: at 10:11

## 2020-09-24 RX ADMIN — HYDRALAZINE HYDROCHLORIDE SCH MG: 50 TABLET, FILM COATED ORAL at 09:00

## 2020-09-24 RX ADMIN — HYDRALAZINE HYDROCHLORIDE SCH MG: 50 TABLET, FILM COATED ORAL at 21:09

## 2020-09-24 RX ADMIN — NEBIVOLOL HYDROCHLORIDE SCH MG: 5 TABLET ORAL at 10:11

## 2020-09-24 RX ADMIN — ENOXAPARIN SODIUM SCH MG: 40 INJECTION SUBCUTANEOUS at 10:10

## 2020-09-24 RX ADMIN — HYDRALAZINE HYDROCHLORIDE SCH MG: 50 TABLET, FILM COATED ORAL at 16:37

## 2020-09-24 RX ADMIN — LOSARTAN POTASSIUM SCH MG: 50 TABLET, FILM COATED ORAL at 09:00

## 2020-09-24 RX ADMIN — Medication SCH DOSE: at 10:13

## 2020-09-24 RX ADMIN — PROBIOTIC PRODUCT - TAB SCH EA: TAB at 10:11

## 2020-09-24 RX ADMIN — Medication SCH MG: at 21:08

## 2020-09-25 VITALS — DIASTOLIC BLOOD PRESSURE: 61 MMHG | SYSTOLIC BLOOD PRESSURE: 121 MMHG

## 2020-09-25 RX ADMIN — Medication SCH MG: at 08:42

## 2020-09-25 RX ADMIN — HYDRALAZINE HYDROCHLORIDE SCH MG: 50 TABLET, FILM COATED ORAL at 16:46

## 2020-09-25 RX ADMIN — LOSARTAN POTASSIUM SCH MG: 50 TABLET, FILM COATED ORAL at 08:41

## 2020-09-25 RX ADMIN — Medication SCH MG: at 20:59

## 2020-09-25 RX ADMIN — ENOXAPARIN SODIUM SCH MG: 40 INJECTION SUBCUTANEOUS at 08:42

## 2020-09-25 RX ADMIN — HYDRALAZINE HYDROCHLORIDE SCH MG: 50 TABLET, FILM COATED ORAL at 20:59

## 2020-09-25 RX ADMIN — PROBIOTIC PRODUCT - TAB SCH EA: TAB at 08:41

## 2020-09-25 RX ADMIN — BACLOFEN SCH MG: 10 TABLET ORAL at 21:00

## 2020-09-25 RX ADMIN — Medication SCH DOSE: at 08:43

## 2020-09-25 RX ADMIN — Medication SCH DOSE: at 21:00

## 2020-09-25 RX ADMIN — ASPIRIN SCH MG: 81 TABLET ORAL at 20:59

## 2020-09-25 RX ADMIN — MULTIPLE VITAMINS W/ MINERALS TAB SCH TAB: TAB at 21:00

## 2020-09-25 RX ADMIN — HYDRALAZINE HYDROCHLORIDE SCH MG: 50 TABLET, FILM COATED ORAL at 08:42

## 2020-09-25 RX ADMIN — NEBIVOLOL HYDROCHLORIDE SCH MG: 5 TABLET ORAL at 08:42

## 2020-09-26 VITALS — DIASTOLIC BLOOD PRESSURE: 55 MMHG | SYSTOLIC BLOOD PRESSURE: 134 MMHG

## 2020-09-26 RX ADMIN — LOSARTAN POTASSIUM SCH MG: 50 TABLET, FILM COATED ORAL at 08:53

## 2020-09-26 RX ADMIN — HYDRALAZINE HYDROCHLORIDE SCH MG: 50 TABLET, FILM COATED ORAL at 08:53

## 2020-09-26 RX ADMIN — Medication SCH MG: at 08:52

## 2020-09-26 RX ADMIN — ENOXAPARIN SODIUM SCH MG: 40 INJECTION SUBCUTANEOUS at 08:52

## 2020-09-26 RX ADMIN — PROBIOTIC PRODUCT - TAB SCH EA: TAB at 08:52

## 2020-09-26 RX ADMIN — HYDRALAZINE HYDROCHLORIDE SCH MG: 50 TABLET, FILM COATED ORAL at 15:46

## 2020-09-26 RX ADMIN — MULTIPLE VITAMINS W/ MINERALS TAB SCH TAB: TAB at 20:30

## 2020-09-26 RX ADMIN — BACLOFEN SCH MG: 10 TABLET ORAL at 20:30

## 2020-09-26 RX ADMIN — NEBIVOLOL HYDROCHLORIDE SCH MG: 5 TABLET ORAL at 08:54

## 2020-09-26 RX ADMIN — Medication SCH DOSE: at 08:54

## 2020-09-26 RX ADMIN — ASPIRIN SCH MG: 81 TABLET ORAL at 20:30

## 2020-09-26 RX ADMIN — Medication SCH DOSE: at 20:32

## 2020-09-26 RX ADMIN — Medication SCH MG: at 20:30

## 2020-09-26 RX ADMIN — HYDRALAZINE HYDROCHLORIDE SCH MG: 50 TABLET, FILM COATED ORAL at 20:31

## 2020-09-27 VITALS — SYSTOLIC BLOOD PRESSURE: 129 MMHG | DIASTOLIC BLOOD PRESSURE: 57 MMHG

## 2020-09-27 RX ADMIN — Medication SCH DOSE: at 21:02

## 2020-09-27 RX ADMIN — LOSARTAN POTASSIUM SCH MG: 50 TABLET, FILM COATED ORAL at 08:08

## 2020-09-27 RX ADMIN — Medication SCH DOSE: at 08:08

## 2020-09-27 RX ADMIN — BACLOFEN SCH MG: 10 TABLET ORAL at 21:02

## 2020-09-27 RX ADMIN — ENOXAPARIN SODIUM SCH MG: 40 INJECTION SUBCUTANEOUS at 08:08

## 2020-09-27 RX ADMIN — POLYETHYLENE GLYCOL 3350 PRN PKT: 17 POWDER, FOR SOLUTION ORAL at 08:07

## 2020-09-27 RX ADMIN — Medication SCH MG: at 08:06

## 2020-09-27 RX ADMIN — PROBIOTIC PRODUCT - TAB SCH EA: TAB at 08:06

## 2020-09-27 RX ADMIN — ASPIRIN SCH MG: 81 TABLET ORAL at 21:02

## 2020-09-27 RX ADMIN — HYDRALAZINE HYDROCHLORIDE SCH MG: 50 TABLET, FILM COATED ORAL at 21:00

## 2020-09-27 RX ADMIN — HYDRALAZINE HYDROCHLORIDE SCH MG: 50 TABLET, FILM COATED ORAL at 08:07

## 2020-09-27 RX ADMIN — NEBIVOLOL HYDROCHLORIDE SCH MG: 5 TABLET ORAL at 08:07

## 2020-09-27 RX ADMIN — HYDRALAZINE HYDROCHLORIDE SCH MG: 50 TABLET, FILM COATED ORAL at 16:00

## 2020-09-27 RX ADMIN — MULTIPLE VITAMINS W/ MINERALS TAB SCH TAB: TAB at 21:02

## 2020-09-27 RX ADMIN — Medication SCH MG: at 21:02

## 2020-09-28 VITALS — SYSTOLIC BLOOD PRESSURE: 134 MMHG | DIASTOLIC BLOOD PRESSURE: 63 MMHG

## 2020-09-28 VITALS — SYSTOLIC BLOOD PRESSURE: 96 MMHG | DIASTOLIC BLOOD PRESSURE: 44 MMHG

## 2020-09-28 LAB
BUN SERPL-MCNC: 45 MG/DL (ref 7–18)
CALCIUM SERPL-MCNC: 9.9 MG/DL (ref 8.8–10.2)
CHLORIDE SERPL-SCNC: 110 MEQ/L (ref 98–107)
CO2 SERPL-SCNC: 26 MEQ/L (ref 21–32)
CREAT SERPL-MCNC: 1.45 MG/DL (ref 0.55–1.3)
GFR SERPL CREATININE-BSD FRML MDRD: 37.6 ML/MIN/{1.73_M2} (ref 39–?)
GLUCOSE SERPL-MCNC: 95 MG/DL (ref 70–100)
HCT VFR BLD AUTO: 38 % (ref 36–47)
HGB BLD-MCNC: 12.1 G/DL (ref 12–15.5)
MCH RBC QN AUTO: 32.4 PG (ref 27–33)
MCHC RBC AUTO-ENTMCNC: 31.8 G/DL (ref 32–36.5)
MCV RBC AUTO: 101.6 FL (ref 80–96)
PLATELET # BLD AUTO: 444 10^3/UL (ref 150–450)
POTASSIUM SERPL-SCNC: 5 MEQ/L (ref 3.5–5.1)
RBC # BLD AUTO: 3.74 10^6/UL (ref 4–5.4)
SODIUM SERPL-SCNC: 140 MEQ/L (ref 136–145)
WBC # BLD AUTO: 7.6 10^3/UL (ref 4–10)

## 2020-09-28 RX ADMIN — Medication SCH DOSE: at 21:00

## 2020-09-28 RX ADMIN — ASPIRIN SCH MG: 81 TABLET ORAL at 21:00

## 2020-09-28 RX ADMIN — SODIUM CHLORIDE SCH MLS/HR: 9 INJECTION, SOLUTION INTRAVENOUS at 21:01

## 2020-09-28 RX ADMIN — SODIUM CHLORIDE SCH MLS/HR: 9 INJECTION, SOLUTION INTRAVENOUS at 08:44

## 2020-09-28 RX ADMIN — MULTIPLE VITAMINS W/ MINERALS TAB SCH TAB: TAB at 21:00

## 2020-09-28 RX ADMIN — Medication SCH DOSE: at 08:55

## 2020-09-28 RX ADMIN — ENOXAPARIN SODIUM SCH MG: 40 INJECTION SUBCUTANEOUS at 08:53

## 2020-09-28 RX ADMIN — Medication SCH MG: at 08:54

## 2020-09-28 RX ADMIN — BACLOFEN SCH MG: 10 TABLET ORAL at 21:00

## 2020-09-28 RX ADMIN — Medication SCH MG: at 21:01

## 2020-09-28 RX ADMIN — PROBIOTIC PRODUCT - TAB SCH EA: TAB at 08:53

## 2020-09-28 RX ADMIN — HYDRALAZINE HYDROCHLORIDE SCH MG: 50 TABLET, FILM COATED ORAL at 16:30

## 2020-09-28 RX ADMIN — NEBIVOLOL HYDROCHLORIDE SCH MG: 5 TABLET ORAL at 08:55

## 2020-09-28 RX ADMIN — LOSARTAN POTASSIUM SCH MG: 50 TABLET, FILM COATED ORAL at 08:53

## 2020-09-28 RX ADMIN — HYDRALAZINE HYDROCHLORIDE SCH MG: 50 TABLET, FILM COATED ORAL at 08:54

## 2020-09-28 NOTE — IPNPDOC
Date Seen


The patient was seen on 9/28/20.





Progress Note


SUBJECTIVE:


No acute complaints. Cr increased to 1.4 gradually. No reports of decreased PO 

intake. Started IVFs for 24 hours, f/u labs in AM. Denies chest pain, n/v/d. 





PHYSICAL EXAM: 


Vitals: As below


General: Laying in bed in no acute distress. 


Chest Exam: CTAB


Heart Exam: normal S1, S2, RRR


Abdomen Exam: Normal bowel sounds, Soft and nontender


Extremity Exam: Normal pulses, No edema. 


Skin Exam: Nl turgor and temperature


MS: tenderness along the lumbar spine going into the sacrum. She has 5/5 

strength in the left upper extremity, strength as 4/5 in the right lower 

extremity, 4/5 in right upper extremity.


Extremities: No swelling, no cyanosis


Neuro: right sided weakness





LABORATORY AND MICROBIOLOGY: Please see below 





ASSESSMENT/PLAN: 


75-year-old female who has chronic right-sided weakness secondary to multiple 

sclerosis (MS), who fell backward as she was walking up a step. CT scan showed  

acute, nondisplaced fracture of the right sacral ala and an acute nondisplaced 

fracture through the S2 vertebral body. There is possible deformity through the 

left iliac wing. Today, patient is unable to participate with PT due to pelvic 

pain on standing/sitting. Further, ANTONIETA noted likely 2/2 diclofenac patch use. 

Patient was changed from ALC to inpatient status on 9/17/20 for workup of 

persistent lumbar back pain, ortho consulted, repeated imaging done. MRI lumbar 

spine showing no spinal stenosis.





#ANTONIETA on CKD-III likely 2/2 to prerenal cause, medications 


-Cr 1.4, gradually increasing 


-Starting IVFs for 24 hrs only. 


-F/u AM labs 


- resolved


- DC IVF





#Multiple sclerosis with resolved exacerbation, chronic right sided weakness.


-Completed course of Solu Medrol 09/01 to 09/05


-MRI Brain: Stable size and appearance of prominent T2/FLAIR hyperintense white 

matter lesions along the -periventricular white matter and corpus callosum, 

compatible  with MS


-Patient is on Avonex as outpatient. It is OK to hold Avonex for 2 months . She 

being in her 70s her immune system is already suppressed so minimal chance of 

flare. If flare occurs while in rehab she can get solumedrol. Discussed with Dr Guerra 


-F/u with neuro in the outpatient setting





# R sacral fx, S2 vertebral body fx s/p fall 


-Seen by ortho service on 9/3/20, plan for conservative therapy, activity orders

clarified 


-Given osteoporosis, patient not a surgical candidate at this time (discussed 

with Dr. Bentley). Recommends bed to chair activity, no active PT/weight bearing 

at this time.


-C/w Ca w -Vitamin D supplements


-PT: c/w rehab. Poor ambulation skills 


-NO LTC per family, trying to work out home plan with family. 


-Will need f/u XRs per orthopedic surgery 


-Pain uncontrolled before. Switched tylenol to ATC from PRN and c/w tramadol. 





#Urinary retention


- voluntary rectal tone minimal (in setting of MS)


- Lumbar spine MRI wo contrast - no evidence spinal canal narrowing.


 


# UTI (urinary tract infection)- treated 


-UA positive for E.coli


-completed course of levofloxacin.





# Hypotension likely 2/2 to overmedication


- losartan decreased to 50 mg PO daily, d/sarita hydralazine PRN. 


- C/w nibovolol 5 mg daily, IVFs 





Other medical Problems that are stable. 


Raynauds 


CHF unspecified type


Chronic CAD / hx of MI


PAD with Right sided CEA





DISPOSITION: Currently ALC status. Plan is likely home with services as family 

does not want LTC. PFS following closely.





VS, I&O, 24H, Fishbone


Vital Signs/I&O





Vital Signs








  Date Time  Temp Pulse Resp B/P (MAP) Pulse Ox O2 Delivery O2 Flow Rate FiO2


 


9/28/20 16:30    120/58    


 


9/28/20 13:35 98.0 66 16  100 Room Air  














I&O- Last 24 Hours up to 6 AM 


 


 9/28/20





 06:00


 


Intake Total 340 ml


 


Output Total 550 ml


 


Balance -210 ml











Laboratory Data


24H LABS


Laboratory Tests 2


9/28/20 05:44: 


Nucleated Red Blood Cells % (auto) 0.0, Anion Gap 4L, Glomerular Filtration Rate

37.6L, Calcium Level 9.9


CBC/BMP


Laboratory Tests


9/28/20 05:44











Current Medications





Current Medications








 Medications


  (Trade)  Dose


 Ordered  Sig/Vida


 Route


 PRN Reason  Start Time


 Stop Time Status Last Admin


Dose Admin


 


 Acetaminophen


  (Tylenol Tab)  650 mg  Q4H  PRN


 PO


 PAIN OR FEVER  9/1/20 19:45


    9/23/20 19:59





 


 Acetaminophen/


 Hydrocodone Bitart


  (Norco, Anexsia


 5/325)  1 tab  Q4HP  PRN


 PO


 MILD/MODERATE PAIN (PS 1-7)  9/2/20 01:45


 9/12/20 17:58 DC 9/12/20 16:20





 


 Al Hydrox/Mg


 Hydrox/Simethicone


  (Mylanta)  30 ml  DAILY  PRN


 PO


 DYSPEPSIA  9/1/20 19:45


     





 


 Aspirin


  (Aspirin


 Chewable)  324 mg  STAT  STAT


 PO


   9/4/20 05:14


 9/4/20 05:15 DC 9/4/20 05:58





 


 Aspirin


  (Ecotrin)  81 mg  QHS


 PO


   9/1/20 21:00


    9/27/20 21:02





 


 Atorvastatin


 Calcium


  (Lipitor)  20 mg  STAT  STAT


 PO


   9/4/20 05:15


 9/4/20 05:16 DC 9/4/20 05:57





 


 Baclofen


  (Lioresal)  20 mg  QHS


 PO


   9/1/20 21:00


    9/27/20 21:02





 


 Calcium/Vitamin D


  (Oscal D)  1,000 mg  BID


 PO


   9/2/20 09:00


    9/28/20 08:54





 


 Diclofenac


 Epolamine


  (Flector 1.3%)  1 patch  Q12H


 TOP


   9/2/20 09:00


 9/17/20 07:24 DC 9/16/20 21:28





 


 Emollient Cream


  (Vanicream)  to right


 foot  Q12H


 TOP


   9/17/20 09:00


    9/28/20 08:55





 


 Enoxaparin Sodium


  (Lovenox)  40 mg  DAILY


 SC


   9/2/20 09:00


    9/28/20 08:53





 


 Home Med


  (Med Rec


 Complete!)    ASDIRECTED


 XX


   9/1/20 18:45


 9/1/20 18:34 DC  





 


 Hydralazine HCl


  (Apresoline)  50 mg  BID


 PO


   9/1/20 21:00


 9/6/20 15:11 DC 9/6/20 09:41





 


 Hydralazine HCl


  (Apresoline)  50 mg  TID


 PO


   9/6/20 16:00


    9/28/20 16:30





 


 Lactobacillus


 Acidophilus


  (Bacid)  1 ea  DAILY


 PO


   9/2/20 09:00


    9/28/20 08:53





 


 Levofloxacin 750


 mg/IV


 Miscellaneous


 Supplies  150 ml @ 


 100 mls/hr  Q48H


 IV


   9/1/20 21:00


 9/6/20 15:11 DC 9/5/20 20:22





 


 Losartan Potassium


  (Cozaar)  50 mg  DAILY


 PO


   9/28/20 09:00


    9/28/20 08:53





 


 Losartan Potassium


  (Cozaar)  100 mg  DAILY


 PO


   9/2/20 09:00


 9/28/20 08:14 DC 9/27/20 08:08





 


 Magnesium


 Hydroxide


  (Milk Of


 Magnesia)  30 ml  DAILY  PRN


 PO


 CONSTIPATION  9/1/20 19:45


    9/19/20 08:36





 


 Methylprednisolone


 1000 mg/IV


 Miscellaneous


 Supplies 1 each/


 Dextrose  266 ml @ 


 266 mls/hr  DAILY@1200


 IV


   9/2/20 12:00


 9/5/20 13:41 DC 9/5/20 11:22





 


 Multivitamins


  (Theragram-M)  1 tab  QHS


 PO


   9/1/20 21:00


    9/27/20 21:02





 


 Nebivolol


  (Bystolic)  5 mg  DAILY


 PO


   9/2/20 09:00


    9/28/20 08:55





 


 Oxycodone/


 Acetaminophen


  (Percocet 5mg/


 325mg Tablet)  1 tab  Q6H


 PO


   9/12/20 18:00


 9/17/20 10:17 DC 9/17/20 05:10





 


 Polyethylene


 Glycol


  (Miralax)  1 pkt  DAILYPRN  PRN


 PO


 CONSTIPATION  9/4/20 15:30


    9/27/20 08:07





 


 Sodium Chloride  1,000 ml @ 


 75 mls/hr  [id number]


 IV


   9/28/20 08:15


    9/28/20 08:44





 


 Sodium Chloride  1,000 ml @ 


 125 mls/hr  Q8H


 IV


   9/1/20 19:45


 9/6/20 11:58 DC 9/6/20 05:56





 


 Sodium Chloride  1,000 ml @ 


 125 mls/hr  Q8H


 IV


   9/17/20 07:30


 9/19/20 19:00 DC 9/19/20 08:36





 


 Tramadol HCl


  (Ultram)  50 mg  Q6HP  PRN


 PO


 MODERATE PAIN (PS 5-7)  9/17/20 10:15


    9/28/20 12:12














Allergies


Coded Allergies:  


     METALS (Verified  Allergy, Intermediate, rash, 9/1/20)











Brandy Main MD            Sep 28, 2020 17:34

## 2020-09-29 VITALS — SYSTOLIC BLOOD PRESSURE: 122 MMHG | DIASTOLIC BLOOD PRESSURE: 57 MMHG

## 2020-09-29 LAB
BUN SERPL-MCNC: 33 MG/DL (ref 7–18)
CALCIUM SERPL-MCNC: 9.3 MG/DL (ref 8.8–10.2)
CHLORIDE SERPL-SCNC: 112 MEQ/L (ref 98–107)
CO2 SERPL-SCNC: 26 MEQ/L (ref 21–32)
CREAT SERPL-MCNC: 1.18 MG/DL (ref 0.55–1.3)
GFR SERPL CREATININE-BSD FRML MDRD: 47.7 ML/MIN/{1.73_M2} (ref 39–?)
GLUCOSE SERPL-MCNC: 93 MG/DL (ref 70–100)
POTASSIUM SERPL-SCNC: 4.7 MEQ/L (ref 3.5–5.1)
SODIUM SERPL-SCNC: 142 MEQ/L (ref 136–145)

## 2020-09-29 RX ADMIN — ENOXAPARIN SODIUM SCH MG: 40 INJECTION SUBCUTANEOUS at 08:14

## 2020-09-29 RX ADMIN — ASPIRIN SCH MG: 81 TABLET ORAL at 21:46

## 2020-09-29 RX ADMIN — LOSARTAN POTASSIUM SCH MG: 50 TABLET, FILM COATED ORAL at 08:15

## 2020-09-29 RX ADMIN — BACLOFEN SCH MG: 10 TABLET ORAL at 21:46

## 2020-09-29 RX ADMIN — Medication SCH MG: at 21:46

## 2020-09-29 RX ADMIN — NEBIVOLOL HYDROCHLORIDE SCH MG: 5 TABLET ORAL at 08:15

## 2020-09-29 RX ADMIN — PROBIOTIC PRODUCT - TAB SCH EA: TAB at 08:13

## 2020-09-29 RX ADMIN — Medication SCH DOSE: at 21:46

## 2020-09-29 RX ADMIN — Medication SCH DOSE: at 08:14

## 2020-09-29 RX ADMIN — Medication SCH MG: at 08:13

## 2020-09-29 RX ADMIN — MULTIPLE VITAMINS W/ MINERALS TAB SCH TAB: TAB at 21:46

## 2020-09-30 VITALS — SYSTOLIC BLOOD PRESSURE: 141 MMHG | DIASTOLIC BLOOD PRESSURE: 66 MMHG

## 2020-09-30 RX ADMIN — Medication SCH MG: at 09:23

## 2020-09-30 RX ADMIN — MULTIPLE VITAMINS W/ MINERALS TAB SCH TAB: TAB at 20:13

## 2020-09-30 RX ADMIN — Medication SCH DOSE: at 20:14

## 2020-09-30 RX ADMIN — PROBIOTIC PRODUCT - TAB SCH EA: TAB at 09:23

## 2020-09-30 RX ADMIN — LOSARTAN POTASSIUM SCH MG: 50 TABLET, FILM COATED ORAL at 09:24

## 2020-09-30 RX ADMIN — Medication SCH MG: at 20:13

## 2020-09-30 RX ADMIN — ASPIRIN SCH MG: 81 TABLET ORAL at 20:13

## 2020-09-30 RX ADMIN — Medication SCH DOSE: at 09:24

## 2020-09-30 RX ADMIN — ENOXAPARIN SODIUM SCH MG: 40 INJECTION SUBCUTANEOUS at 09:24

## 2020-09-30 RX ADMIN — BACLOFEN SCH MG: 10 TABLET ORAL at 20:13

## 2020-09-30 RX ADMIN — NEBIVOLOL HYDROCHLORIDE SCH MG: 5 TABLET ORAL at 09:24

## 2020-09-30 NOTE — CR
DATE:  09/18/2020



REQUESTING CONSULTANT:   Marcelo Momin M.D. 



REASON FOR CONSULTATION:  



HISTORY OF PRESENT ILLNESS:  Ms. De Leon is a 75-year-old female who presented to 
the hospital for evaluation of back pain after suddenly falling backwards. Since
that time, she is reporting significant low back discomfort.  She is denying 
difficulty moving her bowels or bladder.  States that she has a history of MS 
with intermittent exacerbations.  The patient's daughter reports that she was 
having difficulty going to the bathroom; but after further invterview she states
the act of getting to the bathroom is really the issue.  CT imaging exhibited 
bilateral sacral ala and transverse S2 sacral segment fractures. There was also 
question about a ilial subcortical defect.  The patient was then consulted by 
Dr. Grey at which point she recommended weightbearing as tolerated assisted. 
The patient is denying any radiation of pain or gross weakness outside of her 
baseline difficulties she experiences with MS. 



HOME MEDICATIONS:  Reviewed as listed in chart.  



ALLERGIES:  Metals producing intermittent rash. 



MEDICAL HISTORY:  Positive for recent fall, low back discomfort.  MS with right-
sided weakness.  Raynauds.  Chronic kidney disease (CKD) III, chronic 
hypertension, congestive heart failure (CHF) unspecified type.  Chronic coronary
artery disease.  History of myocardial infarction.  Tonsillectomy.  Tubal 
ligation. Right-sided CEA. 



SOCIAL HISTORY: Denies alcohol. She is a former smoker.  Lives with her spouse. 
She is retired.   



FAMILY HISTORY:  Positive for coronary artery disease.  



LABORATORY DATA:  Reviewed as listed in chart.

PHYSICAL EXAMINATION:  Vitals were reviewed from 09/12 which showed temperature 
98.5, pulse 67, respirations 18, blood pressure 115/58, pulse oximetry 96, O2 on
room air.  General appearance:  This is a well-developed, well-nourished 
75-year-old female, she is in no acute distress.  She is alert and oriented 
times 3. Mood and affect are appropriate.  HEENT:  Normocephalic.  Normal 
scleral icterus.  Extraocular movements intact. Cardiovascular:  Chest rises 
symmetrically. Radial and distal pulses are intact.  Lungs:  No dyspnea. 
Respirations are not labored. Abdomen: Soft, nontender times 4.  
Musculoskeletal:  Upper and lower extremities are neurovascularly intact without
tenderness.  She has some soreness about the lumbosacral junction. Skin is 
intact.  No deformity.  She can lift both legs in the air voluntarily without 
pain.  Bilateral hip ranges of motion are not grossly limited or irritable 
through internal sternal range of motion.  Her femurs are nontender.  
Psychiatric:  Alert and oriented x 3. Mood and affect are appropriate.  



ASSESSMENT:  75-year-old female recently took a fall, sustained a sacral 
fracture, which is bilateral sacral ala and a transverse S2 segment nondisplaced
or angulated. Overlapped with a history of MS with right lower extremity 
weakness.  It is possible the fall was secondary to an MS exacerbation and /or 
urinary tract infection (UTI).  



RECOMMENDATIONS:  The patient is having quite a bit of pain and I think we have 
to modify her activity level.  She should be bed to chair until her symptoms are
tolerable and than we can revisit advancing weightbearing status.  I explain 
that this type of injury can take upwards of 12 weeks time for full healing.  I
recommended donut type pillow while seated for comfort.  She will need 
coordinated, transferred to a rehab facility. It is expected this injury will 
produce pain while ambulating.  Nonetheless, there was previous mention of bowel
or bladder issues, although she denies it today.  I think that the patients 
daughter was a little confused about her mothers complaint.  Therefore, I 
recommend a lumbar MRI to rule out a herniation component, which could be 
producing co-pain generator or any type of bowel or bladder issue.  If positive 
for a GI issue, then we would consult Dr. Bentley.  



Thank you for allowing me to participate in Ms. Gonsalez care.  If there is  any 
further orthopedic need, I encourage contacting our service.  I did coordinate 
this plan with Dr. Momin and we discussed the details of her weightbearing
status. At this juncture, if she could tolerate walking independently with a 
walker, then that would be fine.  Although, she has failed to do that, so I 
recommend airing on the side of caution keeping her bed to chair for at least 
several weeks updating sacral imaging in two weeks and deciding if we could 
advance her weightbearing status.  Certainly this would be pending unremarkable 
results on her MRI.   



LEN

## 2020-10-01 VITALS — SYSTOLIC BLOOD PRESSURE: 130 MMHG | DIASTOLIC BLOOD PRESSURE: 65 MMHG

## 2020-10-01 LAB
BUN SERPL-MCNC: 33 MG/DL (ref 7–18)
CALCIUM SERPL-MCNC: 9.5 MG/DL (ref 8.8–10.2)
CHLORIDE SERPL-SCNC: 112 MEQ/L (ref 98–107)
CO2 SERPL-SCNC: 27 MEQ/L (ref 21–32)
CREAT SERPL-MCNC: 1.12 MG/DL (ref 0.55–1.3)
GFR SERPL CREATININE-BSD FRML MDRD: 50.6 ML/MIN/{1.73_M2} (ref 39–?)
GLUCOSE SERPL-MCNC: 95 MG/DL (ref 70–100)
HCT VFR BLD AUTO: 34.7 % (ref 36–47)
HGB BLD-MCNC: 11 G/DL (ref 12–15.5)
MCH RBC QN AUTO: 32.3 PG (ref 27–33)
MCHC RBC AUTO-ENTMCNC: 31.7 G/DL (ref 32–36.5)
MCV RBC AUTO: 101.8 FL (ref 80–96)
PLATELET # BLD AUTO: 401 10^3/UL (ref 150–450)
POTASSIUM SERPL-SCNC: 4.3 MEQ/L (ref 3.5–5.1)
RBC # BLD AUTO: 3.41 10^6/UL (ref 4–5.4)
SODIUM SERPL-SCNC: 143 MEQ/L (ref 136–145)
WBC # BLD AUTO: 6.6 10^3/UL (ref 4–10)

## 2020-10-01 RX ADMIN — Medication SCH MG: at 20:05

## 2020-10-01 RX ADMIN — PROBIOTIC PRODUCT - TAB SCH EA: TAB at 09:47

## 2020-10-01 RX ADMIN — MULTIPLE VITAMINS W/ MINERALS TAB SCH TAB: TAB at 20:05

## 2020-10-01 RX ADMIN — LOSARTAN POTASSIUM SCH MG: 50 TABLET, FILM COATED ORAL at 09:47

## 2020-10-01 RX ADMIN — ENOXAPARIN SODIUM SCH MG: 40 INJECTION SUBCUTANEOUS at 09:45

## 2020-10-01 RX ADMIN — ASPIRIN SCH MG: 81 TABLET ORAL at 20:05

## 2020-10-01 RX ADMIN — Medication SCH DOSE: at 20:05

## 2020-10-01 RX ADMIN — NEBIVOLOL HYDROCHLORIDE SCH MG: 5 TABLET ORAL at 09:47

## 2020-10-01 RX ADMIN — Medication SCH DOSE: at 09:48

## 2020-10-01 RX ADMIN — BACLOFEN SCH MG: 10 TABLET ORAL at 20:05

## 2020-10-01 RX ADMIN — Medication SCH MG: at 09:47

## 2020-10-01 NOTE — IPN
DATE:  09/18/2020



ATTENDING/COSIGNER:  Alen Bentley MD



This is a patient I evaluated today for back pain with bilateral sacral ala 
fractures and a S2 transverse sacral fracture.  It was brought to my attention 
that the patient also had a sacral MRI.   Mirroring findings on CT, this 
exhibited nondisplaced or angulated bilateral sacral ala fracture with S2 sacral
segment fracture.  There is no displacement and no stenosis.  I reexamined the 
patient.  I asked her about her bowel and bladder status.  She denies any 
history of difficulty.  The patients daughter denies any history of 
difficulties with bowel or bladder issues.  She has never worn adult diapers.  
She is currently wearing an adult diaper.  I discussed her bowels with her 
nurse, Claudette Cooley.  She states the patient has been holding her urine with 
some overflow incontinence, but when she has been sat up she has been able to 
void.  Her bowel movements have been voluntary.  I recommended a rectal exam.  
The patient had a rectal exam this morning but actually forgets receiving one.  
On examination, she does have perianal sensation, no anal wink.  Very minimal 
subtle rectal tone.  We discussed findings and concerns.  I reviewed the case 
with our spine surgeon, Dr. Bentley.  We certainly need to being cautious and 
monitor for any signs of cauda equina.  Her history and CT MRI seem to lean away
from this potential.  The patient also has a complicated history of right-sided 
weakness and a history of multiple sclerosis (MS).  I do not know if she would 
necessarily even be a good surgical candidate.  She will be followed closely.  
If she has any further issues or incontinence, orthopaedics is to be notified.



Thank you for allowing me to participate in Mrs. Wallace wilson.  



[facility] HospitalSAUL

## 2020-10-02 VITALS — DIASTOLIC BLOOD PRESSURE: 63 MMHG | SYSTOLIC BLOOD PRESSURE: 117 MMHG

## 2020-10-02 RX ADMIN — Medication SCH DOSE: at 20:10

## 2020-10-02 RX ADMIN — Medication SCH MG: at 20:10

## 2020-10-02 RX ADMIN — LOSARTAN POTASSIUM SCH MG: 50 TABLET, FILM COATED ORAL at 09:09

## 2020-10-02 RX ADMIN — NEBIVOLOL HYDROCHLORIDE SCH MG: 5 TABLET ORAL at 09:09

## 2020-10-02 RX ADMIN — ENOXAPARIN SODIUM SCH MG: 40 INJECTION SUBCUTANEOUS at 09:08

## 2020-10-02 RX ADMIN — Medication SCH MG: at 09:09

## 2020-10-02 RX ADMIN — PROBIOTIC PRODUCT - TAB SCH EA: TAB at 09:09

## 2020-10-02 RX ADMIN — Medication SCH DOSE: at 09:09

## 2020-10-02 RX ADMIN — MULTIPLE VITAMINS W/ MINERALS TAB SCH TAB: TAB at 20:10

## 2020-10-02 RX ADMIN — BACLOFEN SCH MG: 10 TABLET ORAL at 20:10

## 2020-10-02 RX ADMIN — ASPIRIN SCH MG: 81 TABLET ORAL at 20:10

## 2020-10-03 VITALS — DIASTOLIC BLOOD PRESSURE: 66 MMHG | SYSTOLIC BLOOD PRESSURE: 131 MMHG

## 2020-10-03 RX ADMIN — PROBIOTIC PRODUCT - TAB SCH EA: TAB at 08:28

## 2020-10-03 RX ADMIN — MULTIPLE VITAMINS W/ MINERALS TAB SCH TAB: TAB at 20:24

## 2020-10-03 RX ADMIN — Medication SCH DOSE: at 20:25

## 2020-10-03 RX ADMIN — Medication SCH DOSE: at 08:28

## 2020-10-03 RX ADMIN — Medication SCH MG: at 08:28

## 2020-10-03 RX ADMIN — NEBIVOLOL HYDROCHLORIDE SCH MG: 5 TABLET ORAL at 08:29

## 2020-10-03 RX ADMIN — ENOXAPARIN SODIUM SCH MG: 40 INJECTION SUBCUTANEOUS at 08:29

## 2020-10-03 RX ADMIN — LOSARTAN POTASSIUM SCH MG: 50 TABLET, FILM COATED ORAL at 08:29

## 2020-10-03 RX ADMIN — BACLOFEN SCH MG: 10 TABLET ORAL at 20:24

## 2020-10-03 RX ADMIN — ASPIRIN SCH MG: 81 TABLET ORAL at 20:24

## 2020-10-03 RX ADMIN — Medication SCH MG: at 20:24

## 2020-10-04 VITALS — SYSTOLIC BLOOD PRESSURE: 138 MMHG | DIASTOLIC BLOOD PRESSURE: 67 MMHG

## 2020-10-04 RX ADMIN — POLYETHYLENE GLYCOL 3350 PRN PKT: 17 POWDER, FOR SOLUTION ORAL at 09:42

## 2020-10-04 RX ADMIN — BACLOFEN SCH MG: 10 TABLET ORAL at 20:18

## 2020-10-04 RX ADMIN — Medication SCH MG: at 20:17

## 2020-10-04 RX ADMIN — Medication SCH DOSE: at 09:46

## 2020-10-04 RX ADMIN — Medication SCH DOSE: at 20:18

## 2020-10-04 RX ADMIN — LOSARTAN POTASSIUM SCH MG: 50 TABLET, FILM COATED ORAL at 09:45

## 2020-10-04 RX ADMIN — Medication SCH MG: at 09:42

## 2020-10-04 RX ADMIN — NEBIVOLOL HYDROCHLORIDE SCH MG: 5 TABLET ORAL at 09:45

## 2020-10-04 RX ADMIN — PROBIOTIC PRODUCT - TAB SCH EA: TAB at 09:42

## 2020-10-04 RX ADMIN — MULTIPLE VITAMINS W/ MINERALS TAB SCH TAB: TAB at 20:18

## 2020-10-04 RX ADMIN — ASPIRIN SCH MG: 81 TABLET ORAL at 20:17

## 2020-10-04 RX ADMIN — ENOXAPARIN SODIUM SCH MG: 40 INJECTION SUBCUTANEOUS at 09:42

## 2020-10-05 VITALS — DIASTOLIC BLOOD PRESSURE: 66 MMHG | SYSTOLIC BLOOD PRESSURE: 127 MMHG

## 2020-10-05 LAB
BUN SERPL-MCNC: 37 MG/DL (ref 7–18)
CALCIUM SERPL-MCNC: 9.7 MG/DL (ref 8.8–10.2)
CHLORIDE SERPL-SCNC: 110 MEQ/L (ref 98–107)
CO2 SERPL-SCNC: 28 MEQ/L (ref 21–32)
CREAT SERPL-MCNC: 1.29 MG/DL (ref 0.55–1.3)
GFR SERPL CREATININE-BSD FRML MDRD: 43 ML/MIN/{1.73_M2} (ref 39–?)
GLUCOSE SERPL-MCNC: 89 MG/DL (ref 70–100)
HCT VFR BLD AUTO: 39 % (ref 36–47)
HGB BLD-MCNC: 12.6 G/DL (ref 12–15.5)
MCH RBC QN AUTO: 32.4 PG (ref 27–33)
MCHC RBC AUTO-ENTMCNC: 32.3 G/DL (ref 32–36.5)
MCV RBC AUTO: 100.3 FL (ref 80–96)
PLATELET # BLD AUTO: 361 10^3/UL (ref 150–450)
POTASSIUM SERPL-SCNC: 4.3 MEQ/L (ref 3.5–5.1)
RBC # BLD AUTO: 3.89 10^6/UL (ref 4–5.4)
SODIUM SERPL-SCNC: 142 MEQ/L (ref 136–145)
WBC # BLD AUTO: 8.4 10^3/UL (ref 4–10)

## 2020-10-05 RX ADMIN — Medication SCH DOSE: at 20:23

## 2020-10-05 RX ADMIN — Medication SCH MG: at 20:21

## 2020-10-05 RX ADMIN — Medication SCH MG: at 08:43

## 2020-10-05 RX ADMIN — NEBIVOLOL HYDROCHLORIDE SCH MG: 5 TABLET ORAL at 08:40

## 2020-10-05 RX ADMIN — ASPIRIN SCH MG: 81 TABLET ORAL at 20:21

## 2020-10-05 RX ADMIN — PROBIOTIC PRODUCT - TAB SCH EA: TAB at 08:40

## 2020-10-05 RX ADMIN — BACLOFEN SCH MG: 10 TABLET ORAL at 20:21

## 2020-10-05 RX ADMIN — Medication SCH DOSE: at 08:41

## 2020-10-05 RX ADMIN — LOSARTAN POTASSIUM SCH MG: 50 TABLET, FILM COATED ORAL at 08:41

## 2020-10-05 RX ADMIN — ENOXAPARIN SODIUM SCH MG: 40 INJECTION SUBCUTANEOUS at 08:41

## 2020-10-05 RX ADMIN — MULTIPLE VITAMINS W/ MINERALS TAB SCH TAB: TAB at 20:21

## 2020-10-06 VITALS — DIASTOLIC BLOOD PRESSURE: 76 MMHG | SYSTOLIC BLOOD PRESSURE: 121 MMHG

## 2020-10-06 LAB
BASOPHILS # BLD AUTO: 0 10^3/UL (ref 0–0.2)
BASOPHILS # BLD AUTO: 0.1 10^3/UL (ref 0–0.2)
BASOPHILS NFR BLD AUTO: 0.3 % (ref 0–1)
BASOPHILS NFR BLD AUTO: 0.9 % (ref 0–1)
BUN SERPL-MCNC: 32 MG/DL (ref 7–18)
CALCIUM SERPL-MCNC: 10.2 MG/DL (ref 8.8–10.2)
CHLORIDE SERPL-SCNC: 109 MEQ/L (ref 98–107)
CO2 SERPL-SCNC: 27 MEQ/L (ref 21–32)
CREAT SERPL-MCNC: 1.23 MG/DL (ref 0.55–1.3)
EOSINOPHIL # BLD AUTO: 0.2 10^3/UL (ref 0–0.5)
EOSINOPHIL # BLD AUTO: 0.4 10^3/UL (ref 0–0.5)
EOSINOPHIL NFR BLD AUTO: 2.5 % (ref 0–3)
EOSINOPHIL NFR BLD AUTO: 4.6 % (ref 0–3)
GFR SERPL CREATININE-BSD FRML MDRD: 45.4 ML/MIN/{1.73_M2} (ref 39–?)
GLUCOSE SERPL-MCNC: 94 MG/DL (ref 70–100)
HCT VFR BLD AUTO: 37.5 % (ref 36–47)
HCT VFR BLD AUTO: 40.4 % (ref 36–47)
HGB BLD-MCNC: 11.9 G/DL (ref 12–15.5)
HGB BLD-MCNC: 13.1 G/DL (ref 12–15.5)
LYMPHOCYTES # BLD AUTO: 2.3 10^3/UL (ref 1.5–5)
LYMPHOCYTES # BLD AUTO: 2.3 10^3/UL (ref 1.5–5)
LYMPHOCYTES NFR BLD AUTO: 29.6 % (ref 24–44)
LYMPHOCYTES NFR BLD AUTO: 29.9 % (ref 24–44)
MAGNESIUM SERPL-MCNC: 2.1 MG/DL (ref 1.8–2.4)
MCH RBC QN AUTO: 32 PG (ref 27–33)
MCH RBC QN AUTO: 32.3 PG (ref 27–33)
MCHC RBC AUTO-ENTMCNC: 31.7 G/DL (ref 32–36.5)
MCHC RBC AUTO-ENTMCNC: 32.4 G/DL (ref 32–36.5)
MCV RBC AUTO: 100.8 FL (ref 80–96)
MCV RBC AUTO: 99.8 FL (ref 80–96)
MONOCYTES # BLD AUTO: 0.7 10^3/UL (ref 0–0.8)
MONOCYTES # BLD AUTO: 0.7 10^3/UL (ref 0–0.8)
MONOCYTES NFR BLD AUTO: 9.4 % (ref 0–5)
MONOCYTES NFR BLD AUTO: 9.4 % (ref 0–5)
NEUTROPHILS # BLD AUTO: 4.3 10^3/UL (ref 1.5–8.5)
NEUTROPHILS # BLD AUTO: 4.4 10^3/UL (ref 1.5–8.5)
NEUTROPHILS NFR BLD AUTO: 55.7 % (ref 36–66)
NEUTROPHILS NFR BLD AUTO: 56.8 % (ref 36–66)
PLATELET # BLD AUTO: 253 10^3/UL (ref 150–450)
PLATELET # BLD AUTO: 337 10^3/UL (ref 150–450)
POTASSIUM SERPL-SCNC: 4.5 MEQ/L (ref 3.5–5.1)
RBC # BLD AUTO: 3.72 10^6/UL (ref 4–5.4)
RBC # BLD AUTO: 4.05 10^6/UL (ref 4–5.4)
SODIUM SERPL-SCNC: 142 MEQ/L (ref 136–145)
WBC # BLD AUTO: 7.7 10^3/UL (ref 4–10)
WBC # BLD AUTO: 7.7 10^3/UL (ref 4–10)

## 2020-10-06 RX ADMIN — ASPIRIN SCH MG: 81 TABLET ORAL at 20:03

## 2020-10-06 RX ADMIN — Medication SCH DOSE: at 08:15

## 2020-10-06 RX ADMIN — PROBIOTIC PRODUCT - TAB SCH EA: TAB at 08:13

## 2020-10-06 RX ADMIN — NEBIVOLOL HYDROCHLORIDE SCH MG: 5 TABLET ORAL at 08:14

## 2020-10-06 RX ADMIN — ENOXAPARIN SODIUM SCH MG: 40 INJECTION SUBCUTANEOUS at 08:15

## 2020-10-06 RX ADMIN — Medication SCH MG: at 08:15

## 2020-10-06 RX ADMIN — BACLOFEN SCH MG: 10 TABLET ORAL at 20:03

## 2020-10-06 RX ADMIN — LOSARTAN POTASSIUM SCH MG: 50 TABLET, FILM COATED ORAL at 08:13

## 2020-10-06 RX ADMIN — Medication SCH DOSE: at 20:04

## 2020-10-06 RX ADMIN — Medication SCH MG: at 20:03

## 2020-10-06 RX ADMIN — MULTIPLE VITAMINS W/ MINERALS TAB SCH TAB: TAB at 20:03

## 2020-10-06 NOTE — IPNPDOC
Text Note


Date of Service


The patient was seen on 10/6/20.





NOTE


Subjective:


Patient is a 75-year-old  female with a PMHx of Chronic right-sided 

weakness 2/2 multiple sclerosis (MS), Chronic HTN, CHF, Chronic CAD (Hx of MI), 

CKD3, Raynaud's , who presented to the hospital after she fell backward while 

walking up the stairs. Imaging completed in the ER had revealed an acute 

nondisplaced fracture of the right sacral alae. Orthopedic surgery was called on

consultation during this hospital stay.





Patient was seen and examined at the bedside. Currently patient seems to be 

sitting up in bed, comfortable, denies any chest pain, shortness of breath, 

palpitations, nausea, vomiting, abdominal pain, diarrhea, or urinary discomfort.





Objective:


Vitals (See below)


General: Lying in bed, no acute distress, comfortable, Awake / Alert 


HEENT: NC, AT


CVS: +S1S2


Lungs: Fair air entry b/l, no appreciable wheezing, rhonchi or rales


Abdomen: Soft, ND, NT


Extremities: - Edema, - Calf tenderness





Assessment and plan:


s/p ANTONIETA on CKD3 likely 2/2 to prerenal cause, medications 


- Will avoid nephrotoxic medications


- s/p IV fluids 





Multiple sclerosis with resolved exacerbation, chronic right sided weakness.


- MRI Brain: Stable size and appearance of prominent T2/FLAIR hyperintense white

matter lesions along the -periventricular white matter and corpus callosum, 

compatible  with MS


- s/p Solumedrol 


- Patient is on Avonex as outpatient; will resume as outpatient


- Case was initially discussed with Dr. Guerra of neurology; okay to hold Avonex 

for 2 months


- Will have outpatient follow-up with neurology





R sacral fx, S2 vertebral body fx s/p fall 


- Orthopedic surgery has been called on consultation; we'll continue with 

conservative therapy


- Case was initially discussed with Dr. Bentley; Recommends bed to chair activity,

no active PT/weight bearing at this time.


- c/w Calcium and Vitamin D supplements


- c/w pain control with Tylenol and Tramadol 





Urinary retention


- Lumbar spine MRI w/o contrast - no evidence spinal canal narrowing


 


s/p UTI


- Urine culture 9/1: E.coli


- s/p Treatment with Levofloxacin





Hypotension - likely 2/2 to medications 


- c/w Adjusted dose of Losartan 


- s/p Hydralazine 


- c/w Nibovolol 





Raynauds 





CHF unspecified type


- No evidence of exacerbation


- Not on diuretics





Chronic CAD / Hx of MI


- c/w ASA





DVT prophylaxis


- c/w Lovenox 





Durable medical equipment


Wheelchair


1) The beneficiary has a mobility limitation that significantly impairs her 

ability to participate in one or more mobility related activities of daily 

living (MRADL) such as toileting, feeding dressing, grooming and bathing in 

customary locations in the home. A mobility limitation is one that:


a) Prevents her from accomplishing an MRADL entirely


b) Places the beneficiary at reasonably determined heightened risk of morbidity 

or mortality secondary to the attempts to perform an MRADL


c) Prevents the beneficiary from completing an MRADL within a reasonable time 

frame





2) The beneficiary's mobility limitation cannot be sufficiently resolved by the 

use of an appropriate fitted cane or walker





3) Her home provides adequate access between rooms, maneuvering space and 

surface for use of the manual wheelchair that is provided





4) Use of manual wheelchair will significantly improve the beneficiary's ability

to participate in MRADLs and she will use it on a regular basis in the home





5) The beneficiary has not expressed unwillingness to use the manual wheelchair 

that is provided in the home. 





6) She has a caregiver who is available, willing and able to provide assistance 

with the wheelchair





Disposition:


- Will continue with ALC status 


- Looking into home with services





Zack CRISOSTOMO, I+O


Zack CRISOSTOMO, I+O


Laboratory Tests


10/6/20 08:21











Vital Signs








  Date Time  Temp Pulse Resp B/P (MAP) Pulse Ox O2 Delivery O2 Flow Rate FiO2


 


10/6/20 08:14  60      


 


10/6/20 08:13    121/68    


 


10/6/20 06:00 97.2  16  97 Room Air  














I&O- Last 24 Hours up to 6 AM 


 


 10/6/20





 06:00


 


Intake Total 800 ml


 


Output Total 0 ml


 


Balance 800 ml

















TELLO GONZALEZ MD                 Oct 6, 2020 12:38

## 2020-10-07 ENCOUNTER — HOSPITAL ENCOUNTER (INPATIENT)
Dept: HOSPITAL 53 - M PM&R | Age: 75
LOS: 13 days | Discharge: HOME HEALTH | DRG: 560 | End: 2020-10-20
Attending: PHYSICAL MEDICINE & REHABILITATION | Admitting: PHYSICAL MEDICINE & REHABILITATION
Payer: MEDICARE

## 2020-10-07 VITALS — DIASTOLIC BLOOD PRESSURE: 70 MMHG | SYSTOLIC BLOOD PRESSURE: 143 MMHG

## 2020-10-07 VITALS — HEIGHT: 58 IN | WEIGHT: 108.25 LBS | BODY MASS INDEX: 22.72 KG/M2

## 2020-10-07 VITALS — SYSTOLIC BLOOD PRESSURE: 124 MMHG | DIASTOLIC BLOOD PRESSURE: 68 MMHG

## 2020-10-07 VITALS — SYSTOLIC BLOOD PRESSURE: 142 MMHG | DIASTOLIC BLOOD PRESSURE: 68 MMHG

## 2020-10-07 VITALS — SYSTOLIC BLOOD PRESSURE: 133 MMHG | DIASTOLIC BLOOD PRESSURE: 67 MMHG

## 2020-10-07 DIAGNOSIS — Z74.1: ICD-10-CM

## 2020-10-07 DIAGNOSIS — I25.10: ICD-10-CM

## 2020-10-07 DIAGNOSIS — Z74.09: ICD-10-CM

## 2020-10-07 DIAGNOSIS — F41.9: ICD-10-CM

## 2020-10-07 DIAGNOSIS — Z79.82: ICD-10-CM

## 2020-10-07 DIAGNOSIS — G81.91: ICD-10-CM

## 2020-10-07 DIAGNOSIS — Z79.899: ICD-10-CM

## 2020-10-07 DIAGNOSIS — R26.89: ICD-10-CM
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
## 2020-10-07 DIAGNOSIS — Z91.048: ICD-10-CM

## 2020-10-07 DIAGNOSIS — I25.2: ICD-10-CM

## 2020-10-07 DIAGNOSIS — I13.0: ICD-10-CM

## 2020-10-07 DIAGNOSIS — G35: ICD-10-CM

## 2020-10-07 DIAGNOSIS — E11.9: ICD-10-CM

## 2020-10-07 DIAGNOSIS — I50.9: ICD-10-CM
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
## 2020-10-07 DIAGNOSIS — S32.10XD: Primary | ICD-10-CM

## 2020-10-07 DIAGNOSIS — Z87.891: ICD-10-CM

## 2020-10-07 DIAGNOSIS — N18.30: ICD-10-CM

## 2020-10-07 RX ADMIN — MULTIPLE VITAMINS W/ MINERALS TAB SCH TAB: TAB at 22:23

## 2020-10-07 RX ADMIN — SENNOSIDES SCH TAB: 8.6 TABLET, FILM COATED ORAL at 22:24

## 2020-10-07 RX ADMIN — Medication SCH MG: at 22:23

## 2020-10-07 RX ADMIN — Medication SCH DOSE: at 22:22

## 2020-10-07 RX ADMIN — DOCUSATE SODIUM SCH MG: 100 CAPSULE, LIQUID FILLED ORAL at 22:23

## 2020-10-07 RX ADMIN — LOSARTAN POTASSIUM SCH MG: 50 TABLET, FILM COATED ORAL at 08:36

## 2020-10-07 RX ADMIN — Medication SCH MG: at 22:24

## 2020-10-07 RX ADMIN — NEBIVOLOL HYDROCHLORIDE SCH MG: 5 TABLET ORAL at 08:36

## 2020-10-07 RX ADMIN — PROBIOTIC PRODUCT - TAB SCH EA: TAB at 22:23

## 2020-10-07 RX ADMIN — ENOXAPARIN SODIUM SCH MG: 40 INJECTION SUBCUTANEOUS at 08:33

## 2020-10-07 RX ADMIN — BACLOFEN SCH MG: 10 TABLET ORAL at 22:23

## 2020-10-07 RX ADMIN — ASPIRIN SCH MG: 81 TABLET ORAL at 22:23

## 2020-10-07 RX ADMIN — ACETAMINOPHEN SCH MG: 500 TABLET ORAL at 22:24

## 2020-10-07 RX ADMIN — PROBIOTIC PRODUCT - TAB SCH EA: TAB at 08:33

## 2020-10-07 RX ADMIN — Medication SCH MG: at 08:33

## 2020-10-07 RX ADMIN — Medication SCH DOSE: at 08:36

## 2020-10-07 RX ADMIN — WHITE PETROLATUM SCH DOSE: 57; 17 PASTE TOPICAL at 22:24

## 2020-10-07 NOTE — DS.PDOC
Discharge Summary


General


Date of Admission


Sep 2, 2020 at 14:47


Date of Discharge


10/7/2020





Discharge Summary


PROCEDURES PERFORMED DURING STAY: 


[None].





ADMITTING DIAGNOSES / DISCHARGE DIAGNOSES:


s/p ANTONIETA on CKD3 likely 2/2 to prerenal cause, medications 


Multiple sclerosis with resolved exacerbation, chronic right sided weakness.


R sacral fx, S2 vertebral body fx s/p fall 


Urinary retention


s/p UTI


Hypotension - likely 2/2 to medications 


Raynauds 


CHF unspecified type


Chronic CAD / Hx of MI


DVT prophylaxis





COMPLICATIONS/CHIEF COMPLAINT: 


Fall





HISTORY OF PRESENT ILLNESS: 


Patient is a 75-year-old  female with a PMHx of Chronic right-sided 

weakness 2/2 multiple sclerosis (MS), Chronic HTN, CHF, Chronic CAD (Hx of MI), 

CKD3, Raynaud's , who presented to the hospital after she fell backward while 

walking up the stairs. Imaging completed in the ER had revealed an acute 

nondisplaced fracture of the right sacral alae. Orthopedic surgery was called on

 consultation during this hospital stay.





HOSPITAL COURSE: 


s/p ANTONIETA on CKD3 likely 2/2 to prerenal cause, medications 


- Will avoid nephrotoxic medications


- s/p IV fluids 





Multiple sclerosis with resolved exacerbation, chronic right sided weakness.


- MRI Brain: Stable size and appearance of prominent T2/FLAIR hyperintense white

 matter lesions along the -periventricular white matter and corpus callosum, 

compatible  with MS


- s/p Solumedrol 


- Patient is on Avonex as outpatient; will resume as outpatient


- Case was initially discussed with Dr. Guerra of neurology; okay to hold Avonex 

for 2 months


- Will have outpatient follow-up with neurology





R sacral fx, S2 vertebral body fx s/p fall 


- Orthopedic surgery has been called on consultation; we'll continue with 

conservative therapy


- Case was initially discussed with Dr. Bentley; Recommends bed to chair activity,

 no active PT/weight bearing at this time.


- c/w Calcium and Vitamin D supplements


- c/w pain control with Tylenol and Tramadol 


- Will c/w PT and OT in ARU 





Urinary retention


- Lumbar spine MRI w/o contrast - no evidence spinal canal narrowing


 


s/p UTI


- Urine culture 9/1: E.coli


- s/p Treatment with Levofloxacin





s/p Hypotension - likely 2/2 to medications 


- BP well controlled


- c/w Adjusted dose of Losartan 


- s/p Hydralazine 


- c/w Nibovolol 





Raynauds 





CHF unspecified type


- No evidence of exacerbation


- Not on diuretics





Chronic CAD / Hx of MI


- c/w ASA





DVT prophylaxis


- c/w Lovenox 





DISCHARGE MEDICATIONS: 


Please see below.


 


ALLERGIES: 


Please see below.





PHYSICAL EXAMINATION ON DISCHARGE:


Vitals (See below)


General: Lying in bed, appears comfortable, Awake and alert 


HEENT: NC, AT


CVS: +S1S2


Lungs: Fair air entry b/l, auscultation without rhonchi, rales or wheezing


Abdomen: Soft, ND, NT


Extremities: LE are without any edema, - Calf tenderness





LABORATORY DATA: 


Please see below.





ACTIVITY: 


[As tolerated]





DISCHARGE PLAN: 


Discharge to ARU and Follow up with Dr. Nugent on transfer


Follow up with Neurology, Podiatry and Orthopedic surgery within 7 days


Remain compliant with treatment plan and medications


Return to the ER if you experience any problems 





DISPOSITION:


ARU





DISCHARGE CONDITION: 


[Stable].





TIME SPENT ON DISCHARGE: 


35 minutes





Vital Signs/I&Os





Vital Signs








  Date Time  Temp Pulse Resp B/P (MAP) Pulse Ox O2 Delivery O2 Flow Rate FiO2


 


10/7/20 08:36    143/70    


 


10/7/20 08:36  60      


 


10/7/20 06:00 98.0  18  96 Room Air  














I&O- Last 24 Hours up to 6 AM 


 


 10/7/20





 06:00


 


Intake Total 420 ml


 


Balance 420 ml











Discharge Medications


Scheduled


Aspirin (Aspirin EC) 81 Mg Tablet.dr, 81 MG PO QHS, (Reported)


Baclofen (Baclofen) 20 Mg Tablet, 20 MG PO QHS, (Reported)


Calcium/Vitamin D (Calcium 500-Vit D3 200 Tablet) 1 Each Tablet, 1,000 MG PO BID


Cranberry (Cranberry) 400 Mg Capsule, 400 MG PO QHS, (Reported)


Interferon Beta-1A (Avonex) 30 Mcg/0.5 Ml Syringekit, 30 MCG IM QWEEK, (R

eported)


   TUESDAYS 


Lactobacillus Combo No.10 (Probiotic) 1 Each Capsule, 1 TAB PO DAILY, (Reported)


Losartan Potassium (Cozaar) 50 Mg Tablet, 50 MG PO DAILY


Multivit-Min/FA/Lycopen/Lutein (Centrum Silver Tablet) 1 Each Tablet, 1 TAB PO 

QHS, (Reported)


Nebivolol HCl (Bystolic) 5 Mg Tab, 5 MG PO DAILY, (Reported)


Ubidecarenone/Vit E Acet (Co Q-10 100 mg Softgel) 1 Each Capsule, 100 MG PO QHS,

 (Reported)





Scheduled PRN


Polyethylene Glycol 3350 (Polyethylene Glycol 3350) 17 Gm Powd.pack, 1 PKT PO 

DAILYPRN PRN for CONSTIPATION


Tramadol HCl (Tramadol HCl) 50 Mg Tablet, 50 MG PO Q6HP PRN for MODERATE PAIN 

(PS 5-7)





Allergies


Coded Allergies:  


     METALS (Verified  Allergy, Intermediate, rash, 9/1/20)











TELLO GONZALEZ MD                 Oct 7, 2020 16:03

## 2020-10-07 NOTE — HPEPDOC
Physiatrist Note


DATE OF ADMISSION: 10-7-20





DATE OF SERVICE: 10-7-20





TIME OF ADMISSION: Please refer to physician's admission order.





SOURCE OF ADMISSION INFORMATION: [facility] record and patient





CHIEF COMPLAINT:sacral fracture in setting of MS





HISTORY OF PRESENT ILLNESS: 


75F pmh multiple sclerosis with right sided weakness, CKD3, HTN, CHF, CAD with 

hx of MI who fell at home and presented to [facility] ED on 9-1-20 with back pain and 

weakness. She was found to have a urinary tract infection and MS exacerbation 

for which she was given antibiotics and IV solumedrol. CT pelvis revealed, 

Acute nondisplaced fracture of the right sacral ala Acute nondisplaced 

fracture through the S2 vertebral bodyVery slight cortical deformity through 

the mid left iliac wing, questionable nondisplaced fracture. Orthopedics 

evaluated the patient and recommended non-weight bearing for several weeks. She 

received IVF for ANTONIETA on CKD and had episodes of hypotension which was treated by

adjusting her home BP meds. She was evaluated by therapy, noted to have 

impairments below her prior level of function and deemed medically appropriate 

for discharge to ARU on 10-7-20.














REVIEW OF SYSTEMS: The following is a completed review of systems and has been 

reviewed. Review of systems otherwise unremarkable.


PAIN: Patient self reports no pain


EYES: No recent vision changes


EARS, NOSE, & THROAT: No throat pain, or dysphagia, or rhinorrhea


CARDIOVASCULAR: Denies chest pain or palpitations


PULMONARY: Denies shortness of breath


GASTROINTESTINAL: Denies constipation/diarrhea


GENITOURINARY: denies dysuria


MUSCULOSKELETAL: +sacral fracture


NEUROLOGICAL: right sided paresis


HEMATOLOGICAL: denies easy bruising 


SKIN: right volar foot scar


PSYCHIATRIC: Unremarkable


All other review of systems found to be negative.





PAST MEDICAL HISTORY:


as per HPI





PAST SURGICAL HISTORY:


Right sided CEA, tubal ligation, tonsillectomy





ALLERGIES: Please see below.





MEDICATIONS: Please see below.





FAMILY HISTORY: cardiac





SOCIAL HISTORY: Ex-smoker, no etoh/illicit drugs





DIET: low salt





PHYSICAL EXAMINATION:


VITAL SIGNS: Please see below.


GENERAL: Pleasant and cooperative. No acute distress. 


HEENT: PERRL. Extraocular movements intact. Clear conjunctiva


CARDIOVASCULAR: Regular rate and rhythm. No murmurs, rubs, or gallops


LUNGS:Clear to auscultation bilaterally. No wheezes. No rhonchi


ABDOMEN: Soft, nontender, nondistended. Positive bowel sounds. Normal active 

bowel sounds


NEUROLOGICAL: Alert and oriented times three. Cranial nerves II through XII 

grossly intact. Sensation grossly intact


EXTREMITIES: 5\5 strength left upper extremities. 4/5 RUE 4\5 strength right 

lower extremity. 5/5 strength in left lower extremity.





SKIN: no rash, intact 





LABORATORY DATA: Please see below.





IMAGING: Imaging documentation personally reviewed by record





FUNCTIONAL STATUS: 


Premorbid:Mod-I for mobility, some assistance for ADLs


On Admission:Min assist to contact guard for functional transfers, bed mobility,

total for lower body dressing 


GOALS: stand-by assist-Mod I for functional transfers, bed mobility, toileting, 

dressing 





ASSESSMENT:75-year-old F with past medical history of MS who presents status 

post MS exacerbation with fall resulting in sacral fracture





PLAN:


1. Rehab-pT/OT advance mobility and ADLs- NWB bilat LE for 8 weeks since fall 

9-1-20 (per most recent ortho recs)


2.neuro- hx of MS with residual right sided weakness, monitor for worsening 

function, s/p recent course of solumedrol


-resume Avonex on d/c 


3. cardiac- hx of CHF, however not on diuretics, will order daily weights


-CAD s/p MI c/u ASA


-HTN c/u nebivolol and cozaar- medicine consulted to assist in overall 

management


4. resp- monitor for infection, encourage incentive spirometry


5. Ortho- s/p fall with mos t recent MRI confirming 9-19-20  "bilateral sacral 

insufficiency fractures as 


well as a fracture involving the S2 vertebral body", NWB for 8 weeks total 

starting from 9-1-20 per discussion with ortho team


6. DVT ppx-Lovenox 


7.  GI ppx- protonix


8. Pain- tylenol and tramadol prn


9. - monitor for infection and do PVRs


10. Dispo- TBD








POST ADMISSION PHYSICIAN EVALUATION: 


Medical and functional status: Description of medical status, medical 

assessment: As above. Rehabilitation diagnosis and current and prior cold morbid

medical conditions as above. Risk of complications and plans to mitigate them as

above. Description of functional status current status is as above. Prior status

as above.


Status compared to preadmission: There are no clinically significant differences

between the patient's current status and the information described on the pread

mission screening document.


Treatment plan anticipated: Treatment plan is as described above. Required 

disciplines including physical therapy, occupational therapy, others as noted 

above.


Intensity of services: 3 hours a day, 6 days a week. 


Special considerations: There are no specific special or safety considerations 

that would likely preclude immediate implementation of an intensive 

rehabilitation program or subsequently influence the plan of care.





ATTESTATION: Considering all the information above, it is my best judgment that 

this patient requires intensive rehabilitation therapy as described above and an

inpatient hospital environment due to the complexity of nursing, medical, and 

rehabilitation needs required by the patient. Furthermore, this patient can 

reasonably be expected to participate in an benefit from an inpatient 

rehabilitation stay with an interdisciplinary team approach to the delivery of 

rehabilitation care under the direction and supervision of rehabilitation 

physician.





PROGNOSIS: good





ESTIMATED LENGTH OF STAY: 10-14 days.





PROJECTED DISCHARGE DESTINATION: Home with family support and any durable 

medical equipment required to increase functional safety and mobility.





TIME SPENT COUNSELING AND COORDINATING INITIAL CARE: Greater than 70 minutes.


Vital Signs





Vital Signs








  Date Time  Temp Pulse Resp B/P (MAP) Pulse Ox O2 Delivery O2 Flow Rate FiO2


 


10/7/20 16:50 98.7 62 18 133/67 (89) 94 Room Air  











Home Medications


Scheduled


Aspirin (Aspirin EC) 81 Mg Tablet.dr, 81 MG PO QHS, (Reported)


Baclofen (Baclofen) 20 Mg Tablet, 20 MG PO QHS, (Reported)


Calcium/Vitamin D (Calcium 500-Vit D3 200 Tablet) 1 Each Tablet, 1,000 MG PO BID


Cranberry (Cranberry) 400 Mg Capsule, 400 MG PO QHS, (Reported)


Interferon Beta-1A (Avonex) 30 Mcg/0.5 Ml Syringekit, 30 MCG IM QWEEK, 

(Reported)


   TUESDAYS 


Lactobacillus Combo No.10 (Probiotic) 1 Each Capsule, 1 TAB PO DAILY, (Reported)


Losartan Potassium (Cozaar) 50 Mg Tablet, 50 MG PO DAILY


Multivit-Min/FA/Lycopen/Lutein (Centrum Silver Tablet) 1 Each Tablet, 1 TAB PO 

QHS, (Reported)


Nebivolol HCl (Bystolic) 5 Mg Tab, 5 MG PO DAILY, (Reported)


Ubidecarenone/Vit E Acet (Co Q-10 100 mg Softgel) 1 Each Capsule, 100 MG PO QHS,

(Reported)





Scheduled PRN


Polyethylene Glycol 3350 (Polyethylene Glycol 3350) 17 Gm Powd.pack, 1 PKT PO 

DAILYPRN PRN for CONSTIPATION


Tramadol HCl (Tramadol HCl) 50 Mg Tablet, 50 MG PO Q6HP PRN for MODERATE PAIN 

(PS 5-7)





Allergies


Coded Allergies:  


     METALS (Verified  Allergy, Intermediate, rash, 9/1/20)





A-FIB/CHADSVASC


A-FIB History


Current/History of A-Fib/PAF?:  No











AYAH TOWNSEND MD          Oct 7, 2020 16:20

## 2020-10-08 VITALS — SYSTOLIC BLOOD PRESSURE: 127 MMHG | DIASTOLIC BLOOD PRESSURE: 60 MMHG

## 2020-10-08 VITALS — SYSTOLIC BLOOD PRESSURE: 146 MMHG | DIASTOLIC BLOOD PRESSURE: 70 MMHG

## 2020-10-08 VITALS — SYSTOLIC BLOOD PRESSURE: 146 MMHG | DIASTOLIC BLOOD PRESSURE: 76 MMHG

## 2020-10-08 LAB
ALBUMIN SERPL BCG-MCNC: 2.9 GM/DL (ref 3.2–5.2)
ALT SERPL W P-5'-P-CCNC: 36 U/L (ref 12–78)
BASOPHILS # BLD AUTO: 0 10^3/UL (ref 0–0.2)
BASOPHILS NFR BLD AUTO: 0.5 % (ref 0–1)
BILIRUB SERPL-MCNC: 0.2 MG/DL (ref 0.2–1)
BUN SERPL-MCNC: 30 MG/DL (ref 7–18)
CALCIUM SERPL-MCNC: 9.6 MG/DL (ref 8.8–10.2)
CHLORIDE SERPL-SCNC: 112 MEQ/L (ref 98–107)
CO2 SERPL-SCNC: 28 MEQ/L (ref 21–32)
CREAT SERPL-MCNC: 1.2 MG/DL (ref 0.55–1.3)
EOSINOPHIL # BLD AUTO: 0.2 10^3/UL (ref 0–0.5)
EOSINOPHIL NFR BLD AUTO: 2 % (ref 0–3)
GFR SERPL CREATININE-BSD FRML MDRD: 46.8 ML/MIN/{1.73_M2} (ref 39–?)
GLUCOSE SERPL-MCNC: 95 MG/DL (ref 70–100)
HCT VFR BLD AUTO: 39.7 % (ref 36–47)
HGB BLD-MCNC: 12.8 G/DL (ref 12–15.5)
LYMPHOCYTES # BLD AUTO: 2.2 10^3/UL (ref 1.5–5)
LYMPHOCYTES NFR BLD AUTO: 24.8 % (ref 24–44)
MCH RBC QN AUTO: 32.4 PG (ref 27–33)
MCHC RBC AUTO-ENTMCNC: 32.2 G/DL (ref 32–36.5)
MCV RBC AUTO: 100.5 FL (ref 80–96)
MONOCYTES # BLD AUTO: 0.8 10^3/UL (ref 0–0.8)
MONOCYTES NFR BLD AUTO: 8.7 % (ref 0–5)
NEUTROPHILS # BLD AUTO: 5.6 10^3/UL (ref 1.5–8.5)
NEUTROPHILS NFR BLD AUTO: 63.4 % (ref 36–66)
PLATELET # BLD AUTO: 337 10^3/UL (ref 150–450)
POTASSIUM SERPL-SCNC: 4.3 MEQ/L (ref 3.5–5.1)
PROT SERPL-MCNC: 5.7 GM/DL (ref 6.4–8.2)
RBC # BLD AUTO: 3.95 10^6/UL (ref 4–5.4)
SODIUM SERPL-SCNC: 144 MEQ/L (ref 136–145)
WBC # BLD AUTO: 8.9 10^3/UL (ref 4–10)

## 2020-10-08 RX ADMIN — ACETAMINOPHEN SCH MG: 500 TABLET ORAL at 20:22

## 2020-10-08 RX ADMIN — PROBIOTIC PRODUCT - TAB SCH EA: TAB at 15:27

## 2020-10-08 RX ADMIN — Medication SCH MG: at 09:27

## 2020-10-08 RX ADMIN — ACETAMINOPHEN SCH MG: 500 TABLET ORAL at 09:27

## 2020-10-08 RX ADMIN — MULTIPLE VITAMINS W/ MINERALS TAB SCH TAB: TAB at 20:22

## 2020-10-08 RX ADMIN — WHITE PETROLATUM SCH DOSE: 57; 17 PASTE TOPICAL at 15:29

## 2020-10-08 RX ADMIN — PROBIOTIC PRODUCT - TAB SCH EA: TAB at 20:22

## 2020-10-08 RX ADMIN — PROBIOTIC PRODUCT - TAB SCH EA: TAB at 09:27

## 2020-10-08 RX ADMIN — DOCUSATE SODIUM SCH MG: 100 CAPSULE, LIQUID FILLED ORAL at 09:27

## 2020-10-08 RX ADMIN — ACETAMINOPHEN SCH MG: 500 TABLET ORAL at 15:28

## 2020-10-08 RX ADMIN — DOCUSATE SODIUM SCH MG: 100 CAPSULE, LIQUID FILLED ORAL at 20:20

## 2020-10-08 RX ADMIN — LOSARTAN POTASSIUM SCH MG: 50 TABLET, FILM COATED ORAL at 09:32

## 2020-10-08 RX ADMIN — Medication SCH DOSE: at 09:33

## 2020-10-08 RX ADMIN — SENNOSIDES SCH TAB: 8.6 TABLET, FILM COATED ORAL at 20:20

## 2020-10-08 RX ADMIN — BACLOFEN SCH MG: 10 TABLET ORAL at 20:22

## 2020-10-08 RX ADMIN — Medication SCH DOSE: at 20:23

## 2020-10-08 RX ADMIN — Medication SCH MG: at 20:23

## 2020-10-08 RX ADMIN — WHITE PETROLATUM SCH DOSE: 57; 17 PASTE TOPICAL at 20:20

## 2020-10-08 RX ADMIN — ASPIRIN SCH MG: 81 TABLET ORAL at 20:23

## 2020-10-08 RX ADMIN — WHITE PETROLATUM SCH DOSE: 57; 17 PASTE TOPICAL at 09:00

## 2020-10-08 RX ADMIN — ENOXAPARIN SODIUM SCH MG: 30 INJECTION SUBCUTANEOUS at 09:27

## 2020-10-08 RX ADMIN — NEBIVOLOL HYDROCHLORIDE SCH MG: 5 TABLET ORAL at 09:31

## 2020-10-08 RX ADMIN — Medication SCH MG: at 20:22

## 2020-10-08 RX ADMIN — PANTOPRAZOLE SODIUM SCH MG: 40 TABLET, DELAYED RELEASE ORAL at 09:27

## 2020-10-08 NOTE — HPEPDOC
[facility] Medical History & Physical


Date of Admission


Oct 7, 2020


Date of Service:  Oct 8, 2020


Attending Physician:  Brandy Main MD





History and Physical


MEDICINE CONSULT H&P 





HISTORY OF PRESENT ILLNESS: 


Patient is a 75-year-old  female with a PMHx of Chronic right-sided 

weakness 2/2 multiple sclerosis (MS), Chronic HTN, CHF, Chronic CAD (Hx of MI), 

CKD3, Raynaud's , who presented to the hospital ON 10/2/20 after she fell 

backward while walking up the stairs. Imaging completed in the ER had revealed 

an acute nondisplaced fracture of the right sacral area. Patient was admitted 

and  treated for ANTONIETA on CKD, MS with exacerbation, right sacral fx and UTI. Once

medically stable, patient was discharged from acute inpatient and admitted to 

ARU for further PT/OT. 





Upon exam in ARU, patient had no acute complaints. She denied chest pain, SOB, 

fevers, chills, n/v/d, abdominal pain. 





ROS: Negative except for what is mentioned above 





PAST MEDICAL/ SURGICAL HISTORY:


MS w right sided weakness uses walker


Raynauds 


CKD 3


Chronic HTN


CHF unspecified type


Chronic CAD / hx of MI


Tonsillectomy


Tubal Libation


Right sided CEA





SOCIAL HISTORY:


Former smoker/ doesnt drink alcohol / lives w spouse /retired





FAMILY HISTORY:


CAD/MI  mother





ALLERGIES: Please see below.





CURRENT MEDICATIONS: Please see below. 





PHYSICAL EXAMINATION: 


VS: please see below 


General: Lying in bed, appears comfortable, Awake and alert 


HEENT: NC, AT


CVS: +S1S2


Lungs: Fair air entry b/l, auscultation without rhonchi, rales or wheezing


Abdomen: Soft, ND, NT


Extremities: LE are without any edema, - Calf tenderness





Laboratory: Please see below 





ASSESSMENT: 74 y/o F with PMHx of Chronic right-sided weakness 2/2 multiple 

sclerosis (MS), Chronic HTN, CHF, Chronic CAD (Hx of MI), CKD3, Raynaud's with 

recent hospitalization mentioned above, admitted to ARU for continued PT/OT. 





PLAN: 





Multiple sclerosis with chronic right sided weakness.


- recent MRI on file, no new areas of weakness 


- Will have outpatient follow-up with neurology


- C/w current medications 


- PT/OT 





R sacral fx, S2 vertebral body fx s/p fall 


- Continuing with therapy as suggested


- c/w Calcium and Vitamin D supplements


- c/w pain control with Tylenol and Tramadol 


- C/w PT/OT, f/u with Orthopedic surgery 





CKD Stage III


- Cr at baseline 


- monitor with regular labs. 





Urinary retention


- Lumbar spine MRI w/o contrast last admission- no evidence spinal canal 

narrowing


 


HTN


- Stable


- C/w current medications 





DVT px


- Lovenox 





Other chronic and stable issues include: Raynauds phenomenon, CHF unspecified 

type, chronic CAD / Hx of MI





Disposition: Thank you kindly for the consult. If we are needed again, please 

call again at any time.





Vital Signs





Vital Signs








  Date Time  Temp Pulse Resp B/P (MAP) Pulse Ox O2 Delivery O2 Flow Rate FiO2


 


10/8/20 09:31  62  126/60    


 


10/8/20 05:51 97.3  18  92 Room Air  











Laboratory Data


Labs 24H


Laboratory Tests 2


10/8/20 06:45: 


Immature Granulocyte % (Auto) 0.6, Neutrophils (%) (Auto) 63.4, Lymphocytes (%) 

(Auto) 24.8, Monocytes (%) (Auto) 8.7H, Eosinophils (%) (Auto) 2.0, Basophils 

(%) (Auto) 0.5, Neutrophils # (Auto) 5.6, Lymphocytes # (Auto) 2.2, Monocytes # 

(Auto) 0.8, Eosinophils # (Auto) 0.2, Basophils # (Auto) 0.0, Nucleated Red 

Blood Cells % (auto) 0.0, Anion Gap 4L, Glomerular Filtration Rate 46.8, Calcium

Level 9.6, Total Bilirubin 0.2, Aspartate Amino Transf (AST/SGOT) 30, Alanine 

Aminotransferase (ALT/SGPT) 36, Alkaline Phosphatase 168H, Total Protein 5.7L, 

Albumin 2.9L, Albumin/Globulin Ratio 1.0L


CBC/BMP


Laboratory Tests


10/8/20 06:45











Home Medications


Scheduled


Aspirin (Aspirin EC) 81 Mg Tablet.dr, 81 MG PO QHS


Baclofen (Baclofen) 20 Mg Tablet, 20 MG PO QHS


Calcium/Vitamin D (Calcium 500-Vit D3 200 Tablet) 1 Each Tablet, 1,000 MG PO BID


Cranberry (Cranberry) 400 Mg Capsule, 400 MG PO QHS


Interferon Beta-1A (Avonex) 30 Mcg/0.5 Ml Syringekit, 30 MCG IM QWEEK


   TUESDAYS 


Lactobacillus Combo No.10 (Probiotic) 1 Each Capsule, 1 TAB PO DAILY


Losartan Potassium (Cozaar) 50 Mg Tablet, 50 MG PO DAILY


Multivit-Min/FA/Lycopen/Lutein (Centrum Silver Tablet) 1 Each Tablet, 1 TAB PO 

QHS


Nebivolol HCl (Bystolic) 5 Mg Tab, 5 MG PO DAILY


Ubidecarenone/Vit E Acet (Co Q-10 100 mg Softgel) 1 Each Capsule, 100 MG PO QHS





Scheduled PRN


Polyethylene Glycol 3350 (Polyethylene Glycol 3350) 17 Gm Powd.pack, 1 PKT PO 

DAILYPRN PRN for CONSTIPATION


Tramadol HCl (Tramadol HCl) 50 Mg Tablet, 50 MG PO Q6HP PRN for MODERATE PAIN 

(PS 5-7)





Allergies


Coded Allergies:  


     METALS (Verified  Allergy, Intermediate, rash, 9/1/20)





A-FIB/CHADSVASC


A-FIB History


Current/History of A-Fib/PAF?:  No


Current PO Anticoag Therapy:  No





Age/Risk Factor Scoring


CHADSVASC:  








CHADSVASC Response (Comments) Value


 


Age Risk Factor Age 65-74 years old 1


 


Gender Risk Factor Female 1


 


Hx of CHF Yes 1


 


Hx of HTN Yes 1


 


Hx of Stroke/TIA/or VTE No 0


 


Hx of Diabetes No 0


 


Hx of Vascular Disease No 0


 


Total  4











Treatment


Treatment ordered:  Other


Other anticoagulant ordered:  Brandy Lopez MD             Oct 8, 2020 10:59

## 2020-10-08 NOTE — IPNPDOC
PM&R Progress Note


DATE OF SERVICE:  Oct 8, 2020


Physiatrist Progress Note


Subjective:


Patient reporting she feels well, that she has not had a bowel movement in a few

days, but usually goes eveyr 4-5 days and does not want more bowel meds. 








REVIEW OF SYSTEMS: The following is a completed review of systems and has been 

reviewed. Review of systems otherwise unremarkable.


PAIN: Patient self reports no pain


EYES: No recent vision changes


EARS, NOSE, & THROAT: No throat pain, or dysphagia, or rhinorrhea


CARDIOVASCULAR: Denies chest pain or palpitations


PULMONARY: Denies shortness of breath


GASTROINTESTINAL: Denies constipation/diarrhea


GENITOURINARY: denies dysuria


MUSCULOSKELETAL: +sacral fracture


NEUROLOGICAL: right sided paresis


HEMATOLOGICAL: denies easy bruising 


SKIN: right volar foot scar


PSYCHIATRIC: Unremarkable


All other review of systems found to be negative.














PHYSICAL EXAMINATION:


VITAL SIGNS: Please see below.


GENERAL: Pleasant and cooperative. No acute distress. 


HEENT: PERRL. Extraocular movements intact. Clear conjunctiva


CARDIOVASCULAR: Regular rate and rhythm. No murmurs, rubs, or gallops


LUNGS:Clear to auscultation bilaterally. No wheezes. No rhonchi


ABDOMEN: Soft, nontender, nondistended. Positive bowel sounds. Normal active 

bowel sounds


NEUROLOGICAL: Alert and oriented times three. Cranial nerves II through XII 

grossly intact. Sensation grossly intact


EXTREMITIES: 5\5 strength left upper extremities. 4/5 RUE 4\5 strength right 

lower extremity. 5/5 strength in left lower extremity.





SKIN: no rash, intact 














ASSESSMENT:75-year-old F with past medical history of MS who presents status 

post MS exacerbation with fall resulting in sacral fracture





PLAN:


1. Rehab-pT/OT advance mobility and ADLs- NWB bilat  for 8 weeks since fall 

9-1-20 (per most recent ortho recs)


2.neuro- hx of MS with residual right sided weakness, monitor for worsening 

function, s/p recent course of solumedrol


-resume Avonex on d/c 


3. cardiac- hx of CHF, however not on diuretics, will order daily weights


-CAD s/p MI c/u ASA


-HTN c/u nebivolol and cozaar- medicine consulted to assist in overall 

management


4. resp- monitor for infection, encourage incentive spirometry


5. Ortho- s/p fall with mos t recent MRI confirming 9-19-20  "bilateral sacral 

insufficiency fractures as 


well as a fracture involving the S2 vertebral body", NWB for 8 weeks total 

starting from 9-1-20 per discussion with ortho team


6. DVT ppx-Lovenox 


7.  GI ppx- protonix


-patient reproting she has BMs every 4-5 days and does not want to go up on 

bowel meds at this time 


8. Pain- tylenol and tramadol prn


9. - monitor for infection and do PVRs


10. Dispo- TBD


Allergies


Coded Allergies:  


     METALS (Verified  Allergy, Intermediate, rash, 9/1/20)





Vital Signs





Vital Signs








  Date Time  Temp Pulse Resp B/P (MAP) Pulse Ox O2 Delivery O2 Flow Rate FiO2


 


10/8/20 14:00 97.1 60 18 146/70 (95) 100 Room Air  











Laboratory Data


CBC/BMP


Laboratory Tests


10/8/20 06:45








Labs 24H


Laboratory Tests 2


10/8/20 06:45: 


Immature Granulocyte % (Auto) 0.6, Neutrophils (%) (Auto) 63.4, Lymphocytes (%) 

(Auto) 24.8, Monocytes (%) (Auto) 8.7H, Eosinophils (%) (Auto) 2.0, Basophils 

(%) (Auto) 0.5, Neutrophils # (Auto) 5.6, Lymphocytes # (Auto) 2.2, Monocytes # 

(Auto) 0.8, Eosinophils # (Auto) 0.2, Basophils # (Auto) 0.0, Nucleated Red 

Blood Cells % (auto) 0.0, Anion Gap 4L, Glomerular Filtration Rate 46.8, Calcium

Level 9.6, Total Bilirubin 0.2, Aspartate Amino Transf (AST/SGOT) 30, Alanine 

Aminotransferase (ALT/SGPT) 36, Alkaline Phosphatase 168H, Total Protein 5.7L, 

Albumin 2.9L, Albumin/Globulin Ratio 1.0L





Current Medications


Current Medications





Current Medications








 Medications


  (Trade)  Dose


 Ordered  Sig/Vida


 Route


 PRN Reason  Start Time


 Stop Time Status Last Admin


Dose Admin


 


 Acetaminophen


  (Tylenol Tab)  1,000 mg  TID


 PO


   10/7/20 21:00


    10/8/20 15:28





 


 Ascorbic Acid


  (Vitamin C)  500 mg  DAILY


 PO


   10/8/20 09:00


 10/7/20 17:48 DC  





 


 Aspirin


  (Ecotrin)  81 mg  QHS


 PO


   10/7/20 21:00


    10/7/20 22:23





 


 Baclofen


  (Lioresal)  20 mg  QHS


 PO


   10/7/20 21:00


    10/7/20 22:23





 


 Calcium/Vitamin D


  (Oscal D)  1,000 mg  BID


 PO


   10/7/20 21:00


    10/8/20 09:27





 


 Coenzyme Q10


  (Coenzyme Q10)  100 mg  QHS


 PO


   10/7/20 21:00


 11/6/20 20:59  10/7/20 22:23





 


 Docusate Sodium


  (Colace)  100 mg  BID


 PO


   10/7/20 21:00


    10/8/20 09:27





 


 Emollient Cream


  (Vanicream)  APPLY TO


 RIGHT FOOT  BID


 TOP


   10/7/20 21:00


    10/8/20 09:33





 


 Enoxaparin Sodium


  (Lovenox)  30 mg  DAILY


 SC


   10/8/20 09:00


    10/8/20 09:27





 


 Enoxaparin Sodium


  (Lovenox)  40 mg  DAILY


 SC


   10/8/20 09:00


 10/7/20 17:47 DC  





 


 Home Med


  (Med Rec


 Complete!)    ASDIRECTED


 XX


   10/7/20 17:45


 10/7/20 17:43 DC  





 


 Lactobacillus


 Acidophilus


  (Bacid)  1 ea  TID


 PO


   10/7/20 21:00


    10/8/20 15:27





 


 Losartan Potassium


  (Cozaar)  50 mg  QAM


 PO


   10/8/20 09:00


    10/8/20 09:32





 


 Multivitamins


  (Theragram-M)  1 tab  QHS


 PO


   10/7/20 21:00


    10/7/20 22:23





 


 Nebivolol


  (Bystolic)  5 mg  DAILY


 PO


   10/8/20 09:00


    10/8/20 09:31





 


 Pantoprazole


 Sodium


  (Protonix)  40 mg  DAILY


 PO


   10/8/20 09:00


    10/8/20 09:27





 


 Senna


  (Senokot)  1 tab  QHS


 PO


   10/7/20 21:00


     





 


 Tramadol HCl


  (Ultram)  50 mg  Q4HP  PRN


 PO


 MODERATE PAIN (PS 5-7)  10/7/20 16:30


     





 


 Vitamin D


  (Vitamin D)  2,000 units  DAILY


 PO


   10/8/20 09:00


 10/7/20 17:48 AYAH BOYD MD          Oct 8, 2020 17:04

## 2020-10-09 VITALS — SYSTOLIC BLOOD PRESSURE: 129 MMHG | DIASTOLIC BLOOD PRESSURE: 60 MMHG

## 2020-10-09 VITALS — DIASTOLIC BLOOD PRESSURE: 63 MMHG | SYSTOLIC BLOOD PRESSURE: 126 MMHG

## 2020-10-09 VITALS — SYSTOLIC BLOOD PRESSURE: 163 MMHG | DIASTOLIC BLOOD PRESSURE: 71 MMHG

## 2020-10-09 LAB
BASOPHILS # BLD AUTO: 0 10^3/UL (ref 0–0.2)
BASOPHILS NFR BLD AUTO: 0.6 % (ref 0–1)
BUN SERPL-MCNC: 37 MG/DL (ref 7–18)
CALCIUM SERPL-MCNC: 10.2 MG/DL (ref 8.8–10.2)
CHLORIDE SERPL-SCNC: 111 MEQ/L (ref 98–107)
CO2 SERPL-SCNC: 27 MEQ/L (ref 21–32)
CREAT SERPL-MCNC: 1.35 MG/DL (ref 0.55–1.3)
EOSINOPHIL # BLD AUTO: 0.2 10^3/UL (ref 0–0.5)
EOSINOPHIL NFR BLD AUTO: 2.7 % (ref 0–3)
GFR SERPL CREATININE-BSD FRML MDRD: 40.8 ML/MIN/{1.73_M2} (ref 39–?)
GLUCOSE SERPL-MCNC: 90 MG/DL (ref 70–100)
HCT VFR BLD AUTO: 39.5 % (ref 36–47)
HGB BLD-MCNC: 12.5 G/DL (ref 12–15.5)
LYMPHOCYTES # BLD AUTO: 2.2 10^3/UL (ref 1.5–5)
LYMPHOCYTES NFR BLD AUTO: 30.7 % (ref 24–44)
MCH RBC QN AUTO: 32.2 PG (ref 27–33)
MCHC RBC AUTO-ENTMCNC: 31.6 G/DL (ref 32–36.5)
MCV RBC AUTO: 101.8 FL (ref 80–96)
MONOCYTES # BLD AUTO: 0.6 10^3/UL (ref 0–0.8)
MONOCYTES NFR BLD AUTO: 8.5 % (ref 0–5)
NEUTROPHILS # BLD AUTO: 4 10^3/UL (ref 1.5–8.5)
NEUTROPHILS NFR BLD AUTO: 56.8 % (ref 36–66)
PLATELET # BLD AUTO: 307 10^3/UL (ref 150–450)
POTASSIUM SERPL-SCNC: 4.3 MEQ/L (ref 3.5–5.1)
RBC # BLD AUTO: 3.88 10^6/UL (ref 4–5.4)
SODIUM SERPL-SCNC: 143 MEQ/L (ref 136–145)
WBC # BLD AUTO: 7 10^3/UL (ref 4–10)

## 2020-10-09 RX ADMIN — ACETAMINOPHEN SCH MG: 500 TABLET ORAL at 20:16

## 2020-10-09 RX ADMIN — ACETAMINOPHEN SCH MG: 500 TABLET ORAL at 08:11

## 2020-10-09 RX ADMIN — Medication SCH MG: at 20:16

## 2020-10-09 RX ADMIN — LOSARTAN POTASSIUM SCH MG: 50 TABLET, FILM COATED ORAL at 08:11

## 2020-10-09 RX ADMIN — Medication SCH DOSE: at 20:17

## 2020-10-09 RX ADMIN — SENNOSIDES SCH TAB: 8.6 TABLET, FILM COATED ORAL at 20:16

## 2020-10-09 RX ADMIN — MULTIPLE VITAMINS W/ MINERALS TAB SCH TAB: TAB at 20:15

## 2020-10-09 RX ADMIN — BACLOFEN SCH MG: 10 TABLET ORAL at 20:16

## 2020-10-09 RX ADMIN — WHITE PETROLATUM SCH DOSE: 57; 17 PASTE TOPICAL at 16:00

## 2020-10-09 RX ADMIN — DOCUSATE SODIUM SCH MG: 100 CAPSULE, LIQUID FILLED ORAL at 08:11

## 2020-10-09 RX ADMIN — ENOXAPARIN SODIUM SCH MG: 30 INJECTION SUBCUTANEOUS at 08:10

## 2020-10-09 RX ADMIN — ACETAMINOPHEN SCH MG: 500 TABLET ORAL at 16:31

## 2020-10-09 RX ADMIN — PROBIOTIC PRODUCT - TAB SCH EA: TAB at 16:31

## 2020-10-09 RX ADMIN — WHITE PETROLATUM SCH DOSE: 57; 17 PASTE TOPICAL at 08:12

## 2020-10-09 RX ADMIN — PANTOPRAZOLE SODIUM SCH MG: 40 TABLET, DELAYED RELEASE ORAL at 08:11

## 2020-10-09 RX ADMIN — NEBIVOLOL HYDROCHLORIDE SCH MG: 5 TABLET ORAL at 08:10

## 2020-10-09 RX ADMIN — Medication SCH DOSE: at 08:12

## 2020-10-09 RX ADMIN — WHITE PETROLATUM SCH DOSE: 57; 17 PASTE TOPICAL at 20:16

## 2020-10-09 RX ADMIN — DOCUSATE SODIUM SCH MG: 100 CAPSULE, LIQUID FILLED ORAL at 20:15

## 2020-10-09 RX ADMIN — PROBIOTIC PRODUCT - TAB SCH EA: TAB at 20:16

## 2020-10-09 RX ADMIN — Medication SCH MG: at 08:11

## 2020-10-09 RX ADMIN — PROBIOTIC PRODUCT - TAB SCH EA: TAB at 08:11

## 2020-10-09 RX ADMIN — Medication SCH MG: at 20:15

## 2020-10-09 RX ADMIN — ASPIRIN SCH MG: 81 TABLET ORAL at 20:15

## 2020-10-10 VITALS — SYSTOLIC BLOOD PRESSURE: 119 MMHG | DIASTOLIC BLOOD PRESSURE: 58 MMHG

## 2020-10-10 VITALS — DIASTOLIC BLOOD PRESSURE: 57 MMHG | SYSTOLIC BLOOD PRESSURE: 120 MMHG

## 2020-10-10 RX ADMIN — PROBIOTIC PRODUCT - TAB SCH EA: TAB at 16:16

## 2020-10-10 RX ADMIN — BACLOFEN SCH MG: 10 TABLET ORAL at 20:20

## 2020-10-10 RX ADMIN — DOCUSATE SODIUM SCH MG: 100 CAPSULE, LIQUID FILLED ORAL at 20:19

## 2020-10-10 RX ADMIN — Medication SCH MG: at 08:46

## 2020-10-10 RX ADMIN — LOSARTAN POTASSIUM SCH MG: 50 TABLET, FILM COATED ORAL at 08:47

## 2020-10-10 RX ADMIN — SENNOSIDES SCH TAB: 8.6 TABLET, FILM COATED ORAL at 20:20

## 2020-10-10 RX ADMIN — PANTOPRAZOLE SODIUM SCH MG: 40 TABLET, DELAYED RELEASE ORAL at 08:47

## 2020-10-10 RX ADMIN — ACETAMINOPHEN SCH MG: 500 TABLET ORAL at 20:20

## 2020-10-10 RX ADMIN — MULTIPLE VITAMINS W/ MINERALS TAB SCH TAB: TAB at 20:19

## 2020-10-10 RX ADMIN — Medication SCH DOSE: at 08:48

## 2020-10-10 RX ADMIN — WHITE PETROLATUM SCH DOSE: 57; 17 PASTE TOPICAL at 20:20

## 2020-10-10 RX ADMIN — PROBIOTIC PRODUCT - TAB SCH EA: TAB at 08:47

## 2020-10-10 RX ADMIN — PROBIOTIC PRODUCT - TAB SCH EA: TAB at 20:20

## 2020-10-10 RX ADMIN — WHITE PETROLATUM SCH DOSE: 57; 17 PASTE TOPICAL at 08:47

## 2020-10-10 RX ADMIN — NEBIVOLOL HYDROCHLORIDE SCH MG: 5 TABLET ORAL at 08:47

## 2020-10-10 RX ADMIN — ENOXAPARIN SODIUM SCH MG: 30 INJECTION SUBCUTANEOUS at 08:48

## 2020-10-10 RX ADMIN — DOCUSATE SODIUM SCH MG: 100 CAPSULE, LIQUID FILLED ORAL at 08:47

## 2020-10-10 RX ADMIN — ASPIRIN SCH MG: 81 TABLET ORAL at 20:20

## 2020-10-10 RX ADMIN — Medication SCH MG: at 20:19

## 2020-10-10 RX ADMIN — ACETAMINOPHEN SCH MG: 500 TABLET ORAL at 16:00

## 2020-10-10 RX ADMIN — WHITE PETROLATUM SCH DOSE: 57; 17 PASTE TOPICAL at 16:00

## 2020-10-10 RX ADMIN — ACETAMINOPHEN SCH MG: 500 TABLET ORAL at 08:47

## 2020-10-10 RX ADMIN — Medication SCH DOSE: at 20:21

## 2020-10-11 VITALS — SYSTOLIC BLOOD PRESSURE: 126 MMHG | DIASTOLIC BLOOD PRESSURE: 70 MMHG

## 2020-10-11 VITALS — DIASTOLIC BLOOD PRESSURE: 59 MMHG | SYSTOLIC BLOOD PRESSURE: 111 MMHG

## 2020-10-11 VITALS — SYSTOLIC BLOOD PRESSURE: 119 MMHG | DIASTOLIC BLOOD PRESSURE: 56 MMHG

## 2020-10-11 RX ADMIN — PROBIOTIC PRODUCT - TAB SCH EA: TAB at 21:12

## 2020-10-11 RX ADMIN — SENNOSIDES SCH TAB: 8.6 TABLET, FILM COATED ORAL at 21:13

## 2020-10-11 RX ADMIN — DOCUSATE SODIUM SCH MG: 100 CAPSULE, LIQUID FILLED ORAL at 09:00

## 2020-10-11 RX ADMIN — PROBIOTIC PRODUCT - TAB SCH EA: TAB at 09:34

## 2020-10-11 RX ADMIN — WHITE PETROLATUM SCH DOSE: 57; 17 PASTE TOPICAL at 09:00

## 2020-10-11 RX ADMIN — PANTOPRAZOLE SODIUM SCH MG: 40 TABLET, DELAYED RELEASE ORAL at 09:33

## 2020-10-11 RX ADMIN — BACLOFEN SCH MG: 10 TABLET ORAL at 21:13

## 2020-10-11 RX ADMIN — WHITE PETROLATUM SCH DOSE: 57; 17 PASTE TOPICAL at 16:00

## 2020-10-11 RX ADMIN — DOCUSATE SODIUM SCH MG: 100 CAPSULE, LIQUID FILLED ORAL at 21:13

## 2020-10-11 RX ADMIN — Medication SCH MG: at 21:12

## 2020-10-11 RX ADMIN — MULTIPLE VITAMINS W/ MINERALS TAB SCH TAB: TAB at 21:12

## 2020-10-11 RX ADMIN — Medication SCH DOSE: at 21:13

## 2020-10-11 RX ADMIN — ACETAMINOPHEN SCH MG: 500 TABLET ORAL at 21:12

## 2020-10-11 RX ADMIN — WHITE PETROLATUM SCH DOSE: 57; 17 PASTE TOPICAL at 21:00

## 2020-10-11 RX ADMIN — Medication SCH DOSE: at 09:38

## 2020-10-11 RX ADMIN — PROBIOTIC PRODUCT - TAB SCH EA: TAB at 16:29

## 2020-10-11 RX ADMIN — NEBIVOLOL HYDROCHLORIDE SCH MG: 5 TABLET ORAL at 09:36

## 2020-10-11 RX ADMIN — Medication SCH MG: at 21:13

## 2020-10-11 RX ADMIN — LOSARTAN POTASSIUM SCH MG: 50 TABLET, FILM COATED ORAL at 09:37

## 2020-10-11 RX ADMIN — Medication SCH MG: at 09:34

## 2020-10-11 RX ADMIN — ACETAMINOPHEN SCH MG: 500 TABLET ORAL at 09:00

## 2020-10-11 RX ADMIN — ENOXAPARIN SODIUM SCH MG: 30 INJECTION SUBCUTANEOUS at 09:34

## 2020-10-11 RX ADMIN — ACETAMINOPHEN SCH MG: 500 TABLET ORAL at 16:00

## 2020-10-11 RX ADMIN — ASPIRIN SCH MG: 81 TABLET ORAL at 21:12

## 2020-10-12 VITALS — SYSTOLIC BLOOD PRESSURE: 130 MMHG | DIASTOLIC BLOOD PRESSURE: 72 MMHG

## 2020-10-12 VITALS — DIASTOLIC BLOOD PRESSURE: 63 MMHG | SYSTOLIC BLOOD PRESSURE: 136 MMHG

## 2020-10-12 VITALS — DIASTOLIC BLOOD PRESSURE: 68 MMHG | SYSTOLIC BLOOD PRESSURE: 139 MMHG

## 2020-10-12 RX ADMIN — Medication SCH DOSE: at 20:23

## 2020-10-12 RX ADMIN — WHITE PETROLATUM SCH DOSE: 57; 17 PASTE TOPICAL at 08:17

## 2020-10-12 RX ADMIN — SENNOSIDES SCH TAB: 8.6 TABLET, FILM COATED ORAL at 20:22

## 2020-10-12 RX ADMIN — PROBIOTIC PRODUCT - TAB SCH EA: TAB at 17:08

## 2020-10-12 RX ADMIN — WHITE PETROLATUM SCH DOSE: 57; 17 PASTE TOPICAL at 20:23

## 2020-10-12 RX ADMIN — DOCUSATE SODIUM SCH MG: 100 CAPSULE, LIQUID FILLED ORAL at 20:22

## 2020-10-12 RX ADMIN — ACETAMINOPHEN SCH MG: 500 TABLET ORAL at 08:17

## 2020-10-12 RX ADMIN — ENOXAPARIN SODIUM SCH MG: 30 INJECTION SUBCUTANEOUS at 08:17

## 2020-10-12 RX ADMIN — PROBIOTIC PRODUCT - TAB SCH EA: TAB at 20:22

## 2020-10-12 RX ADMIN — ACETAMINOPHEN SCH MG: 500 TABLET ORAL at 17:08

## 2020-10-12 RX ADMIN — PROBIOTIC PRODUCT - TAB SCH EA: TAB at 08:17

## 2020-10-12 RX ADMIN — Medication SCH MG: at 20:22

## 2020-10-12 RX ADMIN — PANTOPRAZOLE SODIUM SCH MG: 40 TABLET, DELAYED RELEASE ORAL at 08:16

## 2020-10-12 RX ADMIN — Medication SCH DOSE: at 08:18

## 2020-10-12 RX ADMIN — BACLOFEN SCH MG: 10 TABLET ORAL at 20:22

## 2020-10-12 RX ADMIN — ACETAMINOPHEN SCH MG: 500 TABLET ORAL at 20:22

## 2020-10-12 RX ADMIN — WHITE PETROLATUM SCH DOSE: 57; 17 PASTE TOPICAL at 16:00

## 2020-10-12 RX ADMIN — NEBIVOLOL HYDROCHLORIDE SCH MG: 5 TABLET ORAL at 09:00

## 2020-10-12 RX ADMIN — LOSARTAN POTASSIUM SCH MG: 50 TABLET, FILM COATED ORAL at 08:16

## 2020-10-12 RX ADMIN — Medication SCH MG: at 08:17

## 2020-10-12 RX ADMIN — MULTIPLE VITAMINS W/ MINERALS TAB SCH TAB: TAB at 20:22

## 2020-10-12 RX ADMIN — ASPIRIN SCH MG: 81 TABLET ORAL at 20:22

## 2020-10-12 RX ADMIN — DOCUSATE SODIUM SCH MG: 100 CAPSULE, LIQUID FILLED ORAL at 08:16

## 2020-10-13 VITALS — SYSTOLIC BLOOD PRESSURE: 146 MMHG | DIASTOLIC BLOOD PRESSURE: 70 MMHG

## 2020-10-13 VITALS — SYSTOLIC BLOOD PRESSURE: 133 MMHG | DIASTOLIC BLOOD PRESSURE: 71 MMHG

## 2020-10-13 VITALS — DIASTOLIC BLOOD PRESSURE: 65 MMHG | SYSTOLIC BLOOD PRESSURE: 137 MMHG

## 2020-10-13 RX ADMIN — Medication SCH MG: at 21:13

## 2020-10-13 RX ADMIN — POLYETHYLENE GLYCOL 3350 SCH PKT: 17 POWDER, FOR SOLUTION ORAL at 09:00

## 2020-10-13 RX ADMIN — MULTIPLE VITAMINS W/ MINERALS TAB SCH TAB: TAB at 21:12

## 2020-10-13 RX ADMIN — ACETAMINOPHEN SCH MG: 500 TABLET ORAL at 15:44

## 2020-10-13 RX ADMIN — NEBIVOLOL HYDROCHLORIDE SCH MG: 5 TABLET ORAL at 09:13

## 2020-10-13 RX ADMIN — Medication SCH DOSE: at 21:13

## 2020-10-13 RX ADMIN — WHITE PETROLATUM SCH DOSE: 57; 17 PASTE TOPICAL at 21:00

## 2020-10-13 RX ADMIN — ASPIRIN SCH MG: 81 TABLET ORAL at 21:12

## 2020-10-13 RX ADMIN — WHITE PETROLATUM SCH DOSE: 57; 17 PASTE TOPICAL at 15:46

## 2020-10-13 RX ADMIN — PROBIOTIC PRODUCT - TAB SCH EA: TAB at 21:12

## 2020-10-13 RX ADMIN — WHITE PETROLATUM SCH DOSE: 57; 17 PASTE TOPICAL at 09:00

## 2020-10-13 RX ADMIN — LOSARTAN POTASSIUM SCH MG: 50 TABLET, FILM COATED ORAL at 09:14

## 2020-10-13 RX ADMIN — ACETAMINOPHEN SCH MG: 500 TABLET ORAL at 09:10

## 2020-10-13 RX ADMIN — PANTOPRAZOLE SODIUM SCH MG: 40 TABLET, DELAYED RELEASE ORAL at 09:11

## 2020-10-13 RX ADMIN — ACETAMINOPHEN SCH MG: 500 TABLET ORAL at 21:13

## 2020-10-13 RX ADMIN — ENOXAPARIN SODIUM SCH MG: 30 INJECTION SUBCUTANEOUS at 09:09

## 2020-10-13 RX ADMIN — Medication SCH MG: at 09:10

## 2020-10-13 RX ADMIN — DOCUSATE SODIUM SCH MG: 100 CAPSULE, LIQUID FILLED ORAL at 21:12

## 2020-10-13 RX ADMIN — SENNOSIDES SCH TAB: 8.6 TABLET, FILM COATED ORAL at 21:12

## 2020-10-13 RX ADMIN — DOCUSATE SODIUM SCH MG: 100 CAPSULE, LIQUID FILLED ORAL at 09:11

## 2020-10-13 RX ADMIN — Medication SCH DOSE: at 09:14

## 2020-10-13 RX ADMIN — PROBIOTIC PRODUCT - TAB SCH EA: TAB at 09:10

## 2020-10-13 RX ADMIN — BACLOFEN SCH MG: 10 TABLET ORAL at 21:13

## 2020-10-13 RX ADMIN — PROBIOTIC PRODUCT - TAB SCH EA: TAB at 15:46

## 2020-10-14 VITALS — DIASTOLIC BLOOD PRESSURE: 71 MMHG | SYSTOLIC BLOOD PRESSURE: 152 MMHG

## 2020-10-14 VITALS — DIASTOLIC BLOOD PRESSURE: 63 MMHG | SYSTOLIC BLOOD PRESSURE: 137 MMHG

## 2020-10-14 VITALS — SYSTOLIC BLOOD PRESSURE: 148 MMHG | DIASTOLIC BLOOD PRESSURE: 82 MMHG

## 2020-10-14 RX ADMIN — Medication SCH MG: at 21:16

## 2020-10-14 RX ADMIN — Medication SCH MG: at 21:15

## 2020-10-14 RX ADMIN — WHITE PETROLATUM SCH DOSE: 57; 17 PASTE TOPICAL at 16:00

## 2020-10-14 RX ADMIN — ENOXAPARIN SODIUM SCH MG: 30 INJECTION SUBCUTANEOUS at 09:04

## 2020-10-14 RX ADMIN — PROBIOTIC PRODUCT - TAB SCH EA: TAB at 09:03

## 2020-10-14 RX ADMIN — Medication SCH MG: at 09:03

## 2020-10-14 RX ADMIN — POLYETHYLENE GLYCOL 3350 SCH PKT: 17 POWDER, FOR SOLUTION ORAL at 09:00

## 2020-10-14 RX ADMIN — NEBIVOLOL HYDROCHLORIDE SCH MG: 5 TABLET ORAL at 09:03

## 2020-10-14 RX ADMIN — ACETAMINOPHEN SCH MG: 500 TABLET ORAL at 16:00

## 2020-10-14 RX ADMIN — LOSARTAN POTASSIUM SCH MG: 50 TABLET, FILM COATED ORAL at 09:04

## 2020-10-14 RX ADMIN — PANTOPRAZOLE SODIUM SCH MG: 40 TABLET, DELAYED RELEASE ORAL at 09:04

## 2020-10-14 RX ADMIN — ASPIRIN SCH MG: 81 TABLET ORAL at 21:15

## 2020-10-14 RX ADMIN — WHITE PETROLATUM SCH DOSE: 57; 17 PASTE TOPICAL at 21:25

## 2020-10-14 RX ADMIN — PROBIOTIC PRODUCT - TAB SCH EA: TAB at 16:15

## 2020-10-14 RX ADMIN — Medication SCH DOSE: at 21:16

## 2020-10-14 RX ADMIN — Medication SCH DOSE: at 09:05

## 2020-10-14 RX ADMIN — ACETAMINOPHEN SCH MG: 500 TABLET ORAL at 09:03

## 2020-10-14 RX ADMIN — BACLOFEN SCH MG: 10 TABLET ORAL at 21:16

## 2020-10-14 RX ADMIN — MULTIPLE VITAMINS W/ MINERALS TAB SCH TAB: TAB at 21:15

## 2020-10-14 RX ADMIN — DOCUSATE SODIUM SCH MG: 100 CAPSULE, LIQUID FILLED ORAL at 21:00

## 2020-10-14 RX ADMIN — WHITE PETROLATUM SCH DOSE: 57; 17 PASTE TOPICAL at 09:00

## 2020-10-14 RX ADMIN — SENNOSIDES SCH TAB: 8.6 TABLET, FILM COATED ORAL at 21:00

## 2020-10-14 RX ADMIN — ACETAMINOPHEN SCH MG: 500 TABLET ORAL at 21:24

## 2020-10-14 RX ADMIN — PROBIOTIC PRODUCT - TAB SCH EA: TAB at 21:16

## 2020-10-14 RX ADMIN — DOCUSATE SODIUM SCH MG: 100 CAPSULE, LIQUID FILLED ORAL at 09:04

## 2020-10-14 NOTE — IPNPDOC
PM&R Progress Note


DATE OF SERVICE:  Oct 13, 2020


Physiatrist Progress Note


DATE OF ADMISSION: Oct 7, 2020 at 16:50 





INPATIENT REHABILITATION ADMISSION DAY: #[6] 





Subjective:


Patient noted to be anxious attempting a slide board transfers requiring 

frequent cueing. With escalation of anxiety, trunk stability diminishes. She is 

relieved to have finally moved her bowels and overall pain management is 

improving.  has arranged for the ramp.





REVIEW OF SYSTEMS: The following is a completed review of systems and has been 

reviewed. Review of systems otherwise unremarkable.


PAIN: Well controlled


EYES: No recent vision changes


EARS, NOSE, & THROAT: No throat pain, or dysphagia, or rhinorrhea


CARDIOVASCULAR: Denies chest pain or palpitations


PULMONARY: Denies shortness of breath


GASTROINTESTINAL: Chronic obstipation, improving with current dietary and 

medication sup


GENITOURINARY: denies dysuria


MUSCULOSKELETAL: +sacral fracture


NEUROLOGICAL: right sided paresis


HEMATOLOGICAL: denies easy bruising 


SKIN: right volar foot scar


PSYCHIATRIC: some anxiety


All other review of systems found to be negative.





PHYSICAL EXAMINATION:


VITAL SIGNS: Please see below.


GENERAL: Pleasant and cooperative. Some anxiety during therapy activities and at

 rest.


HEENT: PERRL. Extraocular movements intact. Clear conjunctiva


CARDIOVASCULAR: Regular rate and rhythm. No murmurs, rubs, or gallops


LUNGS:  clear to auscultation bilaterally. No wheezes. No rhonchi


ABDOMEN: Soft, nontender, nondistended. Positive bowel sounds. Normal active 

bowel sounds


NEUROLOGICAL: Alert and oriented times three. Cranial nerves II through XII 

intact. Sensation grossly intact


EXTREMITIES: 5\5 strength left upper extremities. 4/5 RLUE strength 5/5 strength

 in left lower extremity.


SKIN: no rash, intact 





ASSESSMENT:75-year-old F with past medical history of MS who presents status 

post MS exacerbation with right sided weakness and with fall resulting in sacral

 fracture.





PLAN:


1. Rehab-pT/OT advance mobility and ADLs- NWB bilat [facility] for 8 weeks since fall 

9-1-20 (per most recent ortho recs)


2.neuro- hx of MS with residual right sided weakness, monitor for worsening 

function, s/p recent course of solumedrol


-resume Avonex on d/c 


3. cardiac- hx of CHF, however not on diuretics, will order daily weights


-CAD s/p MI c/u ASA


-HTN c/u nebivolol and cozaar- medicine consulted to assist in overall 

management


4. resp- monitor for infection, encourage incentive spirometry


5. Ortho- s/p fall with most recent MRI confirming 9-19-20  "bilateral sacral 

insufficiency fractures as 


well as a fracture involving the S2 vertebral body", NWB for 8 weeks total 

starting from 9-1-20 per discussion with ortho team


6. DVT ppx-Lovenox 


7.  GI ppx- protonix


-Obstipation, did move today, declines many bowel meds, possibly PEG could be 

helpful 


8. Pain- tylenol and tramadol prn


9. - monitor for infection and do PVRs


10. Dispo- Home with 





Allergies


Coded Allergies:  


     METALS (Verified  Allergy, Intermediate, rash, 9/1/20)





Vital Signs





Vital Signs








  Date Time  Temp Pulse Resp B/P (MAP) Pulse Ox O2 Delivery O2 Flow Rate FiO2


 


10/14/20 09:03  69  130/62    


 


10/14/20 05:52 97.7  18  92 Room Air  











Current Medications


Current Medications





Current Medications








 Medications


  (Trade)  Dose


 Ordered  Sig/Vida


 Route


 PRN Reason  Start Time


 Stop Time Status Last Admin


Dose Admin


 


 Acetaminophen


  (Tylenol Tab)  1,000 mg  TID


 PO


   10/7/20 21:00


    10/14/20 09:03





 


 Ascorbic Acid


  (Vitamin C)  500 mg  DAILY


 PO


   10/8/20 09:00


 10/7/20 17:48 DC  





 


 Aspirin


  (Ecotrin)  81 mg  QHS


 PO


   10/7/20 21:00


    10/13/20 21:12





 


 Baclofen


  (Lioresal)  20 mg  QHS


 PO


   10/7/20 21:00


    10/13/20 21:13





 


 Calcium/Vitamin D


  (Oscal D)  1,000 mg  BID


 PO


   10/7/20 21:00


    10/14/20 09:03





 


 Coenzyme Q10


  (Coenzyme Q10)  100 mg  QHS


 PO


   10/7/20 21:00


 11/6/20 20:59  10/13/20 21:13





 


 Docusate Sodium


  (Colace)  100 mg  BID


 PO


   10/7/20 21:00


    10/14/20 09:04





 


 Emollient Cream


  (Vanicream)  APPLY TO


 RIGHT FOOT  BID


 TOP


   10/7/20 21:00


    10/14/20 09:05





 


 Enoxaparin Sodium


  (Lovenox)  30 mg  DAILY


 SC


   10/8/20 09:00


    10/14/20 09:04





 


 Enoxaparin Sodium


  (Lovenox)  40 mg  DAILY


 SC


   10/8/20 09:00


 10/7/20 17:47 DC  





 


 Glycerin


  (Glycerin Adult


 Suppository)  1 ea  DAILYPRN  PRN


 [place]


 CONSTIPATION  10/12/20 17:45


 10/12/20 17:43 DC  





 


 Glycerin


  (Glycerin Adult


 Suppository)  1 ea  Q3DP  PRN


 [place]


 CONSTIPATION  10/12/20 17:45


     





 


 Home Med


  (Med Rec


 Complete!)    ASDIRECTED


 XX


   10/7/20 17:45


 10/7/20 17:43 DC  





 


 Lactobacillus


 Acidophilus


  (Bacid)  1 ea  TID


 PO


   10/7/20 21:00


    10/14/20 09:03





 


 Losartan Potassium


  (Cozaar)  50 mg  QAM


 PO


   10/8/20 09:00


    10/14/20 09:04





 


 Multivitamins


  (Theragram-M)  1 tab  QHS


 PO


   10/7/20 21:00


    10/13/20 21:12





 


 Nebivolol


  (Bystolic)  5 mg  DAILY


 PO


   10/8/20 09:00


    10/14/20 09:03





 


 Pantoprazole


 Sodium


  (Protonix)  40 mg  DAILY


 PO


   10/8/20 09:00


    10/14/20 09:04





 


 Polyethylene


 Glycol


  (Miralax)  1 pkt  DAILY


 PO


   10/13/20 09:00


     





 


 Senna


  (Senokot)  1 tab  QHS


 PO


   10/7/20 21:00


    10/13/20 21:12





 


 Tramadol HCl


  (Ultram)  50 mg  Q4HP  PRN


 PO


 MODERATE PAIN (PS 5-7)  10/7/20 16:30


    10/12/20 10:27





 


 Vitamin D


  (Vitamin D)  2,000 units  DAILY


 PO


   10/8/20 09:00


 10/7/20 17:48 LALA WHITE MD                Oct 14, 2020 12:25

## 2020-10-14 NOTE — IPNPDOC
PM&R Progress Note


DATE OF SERVICE:  Oct 13, 2020


Physiatrist Progress Note


DATE OF ADMISSION: Oct 7, 2020 at 16:50 


DATE OF FOLLOW UP: Oct 13, 2020





INPATIENT REHABILITATION ADMISSION DAY: # 7





SUBJECTIVE: Patient is a 75-year-old female with MS, mild right hemiparesis, and

unstable sacral fracture, anxiety.


Patient noted to be anxious attempting a slide board transfers requiring 

frequent cueing. This anxiety, trunk stability diminishes. She is relieved to 

have finally moved her bowels and overall pain management is improving.  

of 50 years has arranged the ramp and looks forward to her return home.





ALLERGIES: See Below





MEDICATIONS: Reviewed, see below.





REVIEW OF SYSTEMS: The following is a completed review of systems and has been 

reviewed. Review of systems otherwise unremarkable.


PAIN: Well controlled


EYES: No recent vision changes


EARS, NOSE, & THROAT: No throat pain, or dysphagia, or rhinorrhea


CARDIOVASCULAR: Denies chest pain or palpitations


PULMONARY: Denies shortness of breath


GASTROINTESTINAL: Chronic obstipation, improving with current dietary and 

medication sup


GENITOURINARY: denies dysuria


MUSCULOSKELETAL: +sacral fracture, sp recent neuroma surgery right foot, some 

discomfort


NEUROLOGICAL: right sided paresis


HEMATOLOGICAL: denies easy bruising 


SKIN: right volar foot scar


PSYCHIATRIC: Unremarkable


All other review of systems found to be negative.





PHYSICAL EXAMINATION:


VITAL SIGNS: Please see below.


GENERAL: Pleasant and cooperative. Less anxiety noted today.


HEENT: PERRL. Extraocular movements intact. Clear conjunctiva


CARDIOVASCULAR: Regular rate and rhythm. No murmurs, rubs, or gallops


LUNGS:  clear to auscultation bilaterally. No wheezes. No rhonchi


ABDOMEN: Soft, nontender, nondistended. Positive bowel sounds. Normal active 

bowel sounds


NEUROLOGICAL: Alert and oriented times three. Cranial nerves II through XII 

intact. Sensation grossly intact


EXTREMITIES: 5\5 strength left upper extremities. 4+/5 R/LUE strength with pain 

on testing dorsiflexion on right 5/5 strength in left lower extremity.


SKIN: no rash, intact 





LABS: Creatinine bumped up to 1.35, will encourage more PO Fluids and monitor





ASSESSMENT:75-year-old F with past medical history of MS who presents status 

post MS exacerbation with fall resulting in sacral fracture, seems to be 

gainining strength with therapeutic interventions. 





PLAN:


1. Rehab-PT/OT advance mobility and ADLs- NWB bilat [facility] for 8 weeks since fall 

9-1-20 (per most recent ortho recs)


2.neuro- hx of MS with residual right sided weakness, monitor for worsening 

function, s/p recent course of solumedrol


-resume Avonex on d/c 


3. cardiac- hx of CHF, however not on diuretics, will order daily weights


-CAD s/p MI c/u ASA


-HTN c/u nebivolol and cozaar- medicine consulted to assist in overall 

management


4. resp- monitor for infection, encourage incentive spirometry


5. Ortho- s/p fall with mos t recent MRI confirming 9-19-20  "bilateral sacral 

insufficiency fractures as 


well as a fracture involving the S2 vertebral body", NWB for 8 weeks total 

starting from 9-1-20 per discussion with ortho team. Need clarification on 

timing of follow up imaging.


6. DVT ppx-Lovenox 


7.  GI ppx- protonix


-Obstipation, did move today, declines many bowel meds, possibly regular PEG 

could be helpful 


8. Pain- tylenol and tramadol prn


9. - monitor for infection and do PVRs, monitor ANTONIETA


10. Dispo- TBD





TIME SPENT: Chart Review, examination and documentation 30 minutes.


Allergies


Coded Allergies:  


     METALS (Verified  Allergy, Intermediate, rash, 9/1/20)





Vital Signs





Vital Signs








  Date Time  Temp Pulse Resp B/P (MAP) Pulse Ox O2 Delivery O2 Flow Rate FiO2


 


10/13/20 16:00 99.1 66 18 137/65 (89) 94 Room Air  











Current Medications


Current Medications





Current Medications








 Medications


  (Trade)  Dose


 Ordered  Sig/Vida


 Route


 PRN Reason  Start Time


 Stop Time Status Last Admin


Dose Admin


 


 Acetaminophen


  (Tylenol Tab)  1,000 mg  TID


 PO


   10/7/20 21:00


    10/13/20 15:44





 


 Ascorbic Acid


  (Vitamin C)  500 mg  DAILY


 PO


   10/8/20 09:00


 10/7/20 17:48 DC  





 


 Aspirin


  (Ecotrin)  81 mg  QHS


 PO


   10/7/20 21:00


    10/12/20 20:22





 


 Baclofen


  (Lioresal)  20 mg  QHS


 PO


   10/7/20 21:00


    10/12/20 20:22





 


 Calcium/Vitamin D


  (Oscal D)  1,000 mg  BID


 PO


   10/7/20 21:00


    10/13/20 09:10





 


 Coenzyme Q10


  (Coenzyme Q10)  100 mg  QHS


 PO


   10/7/20 21:00


 11/6/20 20:59  10/12/20 20:22





 


 Docusate Sodium


  (Colace)  100 mg  BID


 PO


   10/7/20 21:00


    10/13/20 09:11





 


 Emollient Cream


  (Vanicream)  APPLY TO


 RIGHT FOOT  BID


 TOP


   10/7/20 21:00


    10/13/20 09:14





 


 Enoxaparin Sodium


  (Lovenox)  30 mg  DAILY


 SC


   10/8/20 09:00


    10/13/20 09:09





 


 Enoxaparin Sodium


  (Lovenox)  40 mg  DAILY


 SC


   10/8/20 09:00


 10/7/20 17:47 DC  





 


 Glycerin


  (Glycerin Adult


 Suppository)  1 ea  DAILYPRN  PRN


 [place]


 CONSTIPATION  10/12/20 17:45


 10/12/20 17:43 DC  





 


 Glycerin


  (Glycerin Adult


 Suppository)  1 ea  Q3DP  PRN


 [place]


 CONSTIPATION  10/12/20 17:45


     





 


 Home Med


  (Med Rec


 Complete!)    ASDIRECTED


 XX


   10/7/20 17:45


 10/7/20 17:43 DC  





 


 Lactobacillus


 Acidophilus


  (Bacid)  1 ea  TID


 PO


   10/7/20 21:00


    10/13/20 15:46





 


 Losartan Potassium


  (Cozaar)  50 mg  QAM


 PO


   10/8/20 09:00


    10/13/20 09:14





 


 Multivitamins


  (Theragram-M)  1 tab  QHS


 PO


   10/7/20 21:00


    10/12/20 20:22





 


 Nebivolol


  (Bystolic)  5 mg  DAILY


 PO


   10/8/20 09:00


    10/13/20 09:13





 


 Pantoprazole


 Sodium


  (Protonix)  40 mg  DAILY


 PO


   10/8/20 09:00


    10/13/20 09:11





 


 Polyethylene


 Glycol


  (Miralax)  1 pkt  DAILY


 PO


   10/13/20 09:00


     





 


 Senna


  (Senokot)  1 tab  QHS


 PO


   10/7/20 21:00


    10/12/20 20:22





 


 Tramadol HCl


  (Ultram)  50 mg  Q4HP  PRN


 PO


 MODERATE PAIN (PS 5-7)  10/7/20 16:30


    10/12/20 10:27





 


 Vitamin D


  (Vitamin D)  2,000 units  DAILY


 PO


   10/8/20 09:00


 10/7/20 17:48 LALA WHITE MD                Oct 13, 2020 18:20

## 2020-10-15 VITALS — DIASTOLIC BLOOD PRESSURE: 70 MMHG | SYSTOLIC BLOOD PRESSURE: 149 MMHG

## 2020-10-15 VITALS — DIASTOLIC BLOOD PRESSURE: 68 MMHG | SYSTOLIC BLOOD PRESSURE: 140 MMHG

## 2020-10-15 VITALS — DIASTOLIC BLOOD PRESSURE: 67 MMHG | SYSTOLIC BLOOD PRESSURE: 150 MMHG

## 2020-10-15 RX ADMIN — NEBIVOLOL HYDROCHLORIDE SCH MG: 5 TABLET ORAL at 09:18

## 2020-10-15 RX ADMIN — ACETAMINOPHEN SCH MG: 500 TABLET ORAL at 09:17

## 2020-10-15 RX ADMIN — PROBIOTIC PRODUCT - TAB SCH EA: TAB at 09:18

## 2020-10-15 RX ADMIN — PROBIOTIC PRODUCT - TAB SCH EA: TAB at 16:06

## 2020-10-15 RX ADMIN — Medication SCH MG: at 20:44

## 2020-10-15 RX ADMIN — LOSARTAN POTASSIUM SCH MG: 50 TABLET, FILM COATED ORAL at 09:17

## 2020-10-15 RX ADMIN — DOCUSATE SODIUM SCH MG: 100 CAPSULE, LIQUID FILLED ORAL at 20:43

## 2020-10-15 RX ADMIN — Medication SCH DOSE: at 09:21

## 2020-10-15 RX ADMIN — Medication SCH MG: at 09:17

## 2020-10-15 RX ADMIN — ASPIRIN SCH MG: 81 TABLET ORAL at 20:43

## 2020-10-15 RX ADMIN — WHITE PETROLATUM SCH DOSE: 57; 17 PASTE TOPICAL at 20:45

## 2020-10-15 RX ADMIN — PANTOPRAZOLE SODIUM SCH MG: 40 TABLET, DELAYED RELEASE ORAL at 09:17

## 2020-10-15 RX ADMIN — BACLOFEN SCH MG: 10 TABLET ORAL at 20:43

## 2020-10-15 RX ADMIN — POLYETHYLENE GLYCOL 3350 SCH PKT: 17 POWDER, FOR SOLUTION ORAL at 09:00

## 2020-10-15 RX ADMIN — MULTIPLE VITAMINS W/ MINERALS TAB SCH TAB: TAB at 20:43

## 2020-10-15 RX ADMIN — Medication SCH DOSE: at 20:46

## 2020-10-15 RX ADMIN — Medication SCH MG: at 20:43

## 2020-10-15 RX ADMIN — WHITE PETROLATUM SCH DOSE: 57; 17 PASTE TOPICAL at 09:20

## 2020-10-15 RX ADMIN — ACETAMINOPHEN SCH MG: 500 TABLET ORAL at 16:06

## 2020-10-15 RX ADMIN — PROBIOTIC PRODUCT - TAB SCH EA: TAB at 20:43

## 2020-10-15 RX ADMIN — ENOXAPARIN SODIUM SCH MG: 30 INJECTION SUBCUTANEOUS at 09:16

## 2020-10-15 RX ADMIN — DOCUSATE SODIUM SCH MG: 100 CAPSULE, LIQUID FILLED ORAL at 09:00

## 2020-10-15 RX ADMIN — WHITE PETROLATUM SCH DOSE: 57; 17 PASTE TOPICAL at 16:07

## 2020-10-15 RX ADMIN — SENNOSIDES SCH TAB: 8.6 TABLET, FILM COATED ORAL at 20:43

## 2020-10-15 RX ADMIN — ACETAMINOPHEN SCH MG: 500 TABLET ORAL at 20:43

## 2020-10-15 NOTE — IPNPDOC
PM&R Progress Note


DATE OF SERVICE:  Oct 15, 2020


Physiatrist Progress Note


DATE OF ADMISSION: Oct 7, 2020 at 16:50 





Physiatrist Progress Note


DATE OF ADMISSION: Oct 7, 2020 at 16:50 





INPATIENT REHABILITATION ADMISSION DAY: #9 


DATE OF SERVICE: 10.15.2020





SUBJECTIVE:


Patient calmer today and feeling more confidence with increasing strength and 

endurance. We discussed strategies to center herself, take deep breaths and pace

herself to reduce anxiety maintain optimal trunk stability during transitional 

activities. She has little discomfort in Sacral region and we discussed ortho 

follow up imaging to monitor progression of healing. 





FUNCTIONAL STATUS: Min A using [employer] board, family training underway, working on

WC mobility and making request for WC with removable arms, tip bars gel cushion 

for home use. Able to independently perform self care with setup.





REVIEW OF SYSTEMS: The following is a completed review of systems and has been 

reviewed. Review of systems otherwise unremarkable.


PAIN: Well controlled


EYES: No recent vision changes


EARS, NOSE, & THROAT: No throat pain, or dysphagia, or rhinorrhea


CARDIOVASCULAR: Denies chest pain or palpitations


PULMONARY: Denies shortness of breath


GASTROINTESTINAL: Chronic obstipation, improving with current dietary and 

medication sup


GENITOURINARY: denies dysuria


MUSCULOSKELETAL: +sacral fracture


NEUROLOGICAL: right sided paresis


HEMATOLOGICAL: denies easy bruising 


SKIN: right volar foot scar, still hypertrophic and some tenderness with 

pressure.


PSYCHIATRIC: less anxiety


All other review of systems found to be negative.





LABORATORY: See below. Encourage fluid intake, sl elevation of CR.





PHYSICAL EXAMINATION:


VITAL SIGNS: Please see below.


GENERAL: Pleasant and cooperative. 


HEENT: PERRL. Extraocular movements intact. Clear conjunctiva


CARDIOVASCULAR: Regular rate and rhythm. No murmurs, rubs, or gallops


LUNGS:  clear to auscultation bilaterally. No wheezes. No rhonchi


ABDOMEN: Soft, nontender, nondistended. Positive bowel sounds. Normal active 

bowel sounds


NEUROLOGICAL: Alert and oriented times three. Cranial nerves II through XII 

intact. Sensation grossly intact


EXTREMITIES: 5\5 strength bilateral upper extremities. 5/5 strength in left 4+/5

right lower extremity. 


SKIN: no rash, intact 





ASSESSMENT:75-year-old F with past medical history of MS who presents status 

post MS exacerbation with right sided weakness and with fall resulting in sacral

fracture, making good progress..





PLAN:


1. Rehab-pT/OT advance mobility and ADLs- NWB bilat [facility] for 8 weeks since fall 

9-1-20 (per most recent ortho recs)


2.neuro- hx of MS with residual right sided weakness, monitor for worsening 

function, s/p recent course of solumedrol


-resume Avonex on d/c 


3. cardiac- hx of CHF, however not on diuretics, will order daily weights


-CAD s/p MI c/u ASA


-HTN c/u nebivolol and cozaar- medicine consulted to assist in overall 

management


4. resp- monitor for infection, encourage incentive spirometry


5. Ortho- s/p fall with most recent MRI confirming 9-19-20  "bilateral sacral 

insufficiency fractures as 


well as a fracture involving the S2 vertebral body", NWB for 8 weeks total 

starting from 9-1-20 per discussion with ortho team


6. DVT ppx-Lovenox 


7.  GI ppx- protonix


-Obstipation, did move today, declines many bowel meds, possibly PEG could be 

helpful 


8. Pain- tylenol and tramadol prn


9. - monitor for infection and do PVRs


10. Dispo- Home with 





Allergies


Coded Allergies:  


     METALS (Verified  Allergy, Intermediate, rash, 9/1/20)





Vital Signs





Vital Signs








  Date Time  Temp Pulse Resp B/P (MAP) Pulse Ox O2 Delivery O2 Flow Rate FiO2


 


10/15/20 09:18  67  149/70    


 


10/15/20 06:14 97.4  18  96 Room Air  











Current Medications


Current Medications





Current Medications








 Medications


  (Trade)  Dose


 Ordered  Sig/Vida


 Route


 PRN Reason  Start Time


 Stop Time Status Last Admin


Dose Admin


 


 Acetaminophen


  (Tylenol Tab)  1,000 mg  TID


 PO


   10/7/20 21:00


    10/15/20 09:17





 


 Ascorbic Acid


  (Vitamin C)  500 mg  DAILY


 PO


   10/8/20 09:00


 10/7/20 17:48 DC  





 


 Aspirin


  (Ecotrin)  81 mg  QHS


 PO


   10/7/20 21:00


    10/14/20 21:15





 


 Baclofen


  (Lioresal)  20 mg  QHS


 PO


   10/7/20 21:00


    10/14/20 21:16





 


 Calcium/Vitamin D


  (Oscal D)  1,000 mg  BID


 PO


   10/7/20 21:00


    10/15/20 09:17





 


 Coenzyme Q10


  (Coenzyme Q10)  100 mg  QHS


 PO


   10/7/20 21:00


 11/6/20 20:59  10/14/20 21:16





 


 Docusate Sodium


  (Colace)  100 mg  BID


 PO


   10/7/20 21:00


    10/14/20 09:04





 


 Emollient Cream


  (Vanicream)  APPLY TO


 RIGHT FOOT  BID


 TOP


   10/7/20 21:00


    10/15/20 09:21





 


 Enoxaparin Sodium


  (Lovenox)  30 mg  DAILY


 SC


   10/8/20 09:00


    10/15/20 09:16





 


 Enoxaparin Sodium


  (Lovenox)  40 mg  DAILY


 SC


   10/8/20 09:00


 10/7/20 17:47 DC  





 


 Glycerin


  (Glycerin Adult


 Suppository)  1 ea  DAILYPRN  PRN


 [place]


 CONSTIPATION  10/12/20 17:45


 10/12/20 17:43 DC  





 


 Glycerin


  (Glycerin Adult


 Suppository)  1 ea  Q3DP  PRN


 [place]


 CONSTIPATION  10/12/20 17:45


     





 


 Home Med


  (Med Rec


 Complete!)    ASDIRECTED


 XX


   10/7/20 17:45


 10/7/20 17:43 DC  





 


 Lactobacillus


 Acidophilus


  (Bacid)  1 ea  TID


 PO


   10/7/20 21:00


    10/15/20 09:18





 


 Losartan Potassium


  (Cozaar)  50 mg  QAM


 PO


   10/8/20 09:00


    10/15/20 09:17





 


 Multivitamins


  (Theragram-M)  1 tab  QHS


 PO


   10/7/20 21:00


    10/14/20 21:15





 


 Nebivolol


  (Bystolic)  5 mg  DAILY


 PO


   10/8/20 09:00


    10/15/20 09:18





 


 Pantoprazole


 Sodium


  (Protonix)  40 mg  DAILY


 PO


   10/8/20 09:00


    10/15/20 09:17





 


 Polyethylene


 Glycol


  (Miralax)  1 pkt  DAILY


 PO


   10/13/20 09:00


     





 


 Senna


  (Senokot)  1 tab  QHS


 PO


   10/7/20 21:00


    10/13/20 21:12





 


 Tramadol HCl


  (Ultram)  50 mg  Q4HP  PRN


 PO


 MODERATE PAIN (PS 5-7)  10/7/20 16:30


 10/14/20 16:29 DC 10/12/20 10:27





 


 Vitamin D


  (Vitamin D)  2,000 units  DAILY


 PO


   10/8/20 09:00


 10/7/20 17:48 DC  




















LALA MESA MD                Oct 15, 2020 10:44

## 2020-10-16 VITALS — SYSTOLIC BLOOD PRESSURE: 147 MMHG | DIASTOLIC BLOOD PRESSURE: 70 MMHG

## 2020-10-16 VITALS — SYSTOLIC BLOOD PRESSURE: 135 MMHG | DIASTOLIC BLOOD PRESSURE: 61 MMHG

## 2020-10-16 VITALS — DIASTOLIC BLOOD PRESSURE: 78 MMHG | SYSTOLIC BLOOD PRESSURE: 167 MMHG

## 2020-10-16 LAB
BUN SERPL-MCNC: 30 MG/DL (ref 7–18)
CALCIUM SERPL-MCNC: 9.6 MG/DL (ref 8.8–10.2)
CHLORIDE SERPL-SCNC: 110 MEQ/L (ref 98–107)
CO2 SERPL-SCNC: 28 MEQ/L (ref 21–32)
CREAT SERPL-MCNC: 1.37 MG/DL (ref 0.55–1.3)
GFR SERPL CREATININE-BSD FRML MDRD: 40.1 ML/MIN/{1.73_M2} (ref 39–?)
GLUCOSE SERPL-MCNC: 124 MG/DL (ref 70–100)
POTASSIUM SERPL-SCNC: 4.1 MEQ/L (ref 3.5–5.1)
SODIUM SERPL-SCNC: 145 MEQ/L (ref 136–145)

## 2020-10-16 RX ADMIN — NEBIVOLOL HYDROCHLORIDE SCH MG: 5 TABLET ORAL at 09:45

## 2020-10-16 RX ADMIN — PROBIOTIC PRODUCT - TAB SCH EA: TAB at 16:22

## 2020-10-16 RX ADMIN — ENOXAPARIN SODIUM SCH MG: 30 INJECTION SUBCUTANEOUS at 09:39

## 2020-10-16 RX ADMIN — PANTOPRAZOLE SODIUM SCH MG: 40 TABLET, DELAYED RELEASE ORAL at 09:40

## 2020-10-16 RX ADMIN — Medication SCH MG: at 20:09

## 2020-10-16 RX ADMIN — WHITE PETROLATUM SCH DOSE: 57; 17 PASTE TOPICAL at 16:00

## 2020-10-16 RX ADMIN — BACLOFEN SCH MG: 10 TABLET ORAL at 20:08

## 2020-10-16 RX ADMIN — ACETAMINOPHEN SCH MG: 500 TABLET ORAL at 16:23

## 2020-10-16 RX ADMIN — PROBIOTIC PRODUCT - TAB SCH EA: TAB at 20:08

## 2020-10-16 RX ADMIN — SENNOSIDES SCH TAB: 8.6 TABLET, FILM COATED ORAL at 20:09

## 2020-10-16 RX ADMIN — MULTIPLE VITAMINS W/ MINERALS TAB SCH TAB: TAB at 20:08

## 2020-10-16 RX ADMIN — PROBIOTIC PRODUCT - TAB SCH EA: TAB at 09:39

## 2020-10-16 RX ADMIN — ACETAMINOPHEN SCH MG: 500 TABLET ORAL at 20:11

## 2020-10-16 RX ADMIN — WHITE PETROLATUM SCH DOSE: 57; 17 PASTE TOPICAL at 20:10

## 2020-10-16 RX ADMIN — DOCUSATE SODIUM SCH MG: 100 CAPSULE, LIQUID FILLED ORAL at 20:09

## 2020-10-16 RX ADMIN — ACETAMINOPHEN SCH MG: 500 TABLET ORAL at 09:40

## 2020-10-16 RX ADMIN — Medication SCH MG: at 09:40

## 2020-10-16 RX ADMIN — DOCUSATE SODIUM SCH MG: 100 CAPSULE, LIQUID FILLED ORAL at 09:39

## 2020-10-16 RX ADMIN — WHITE PETROLATUM SCH DOSE: 57; 17 PASTE TOPICAL at 09:00

## 2020-10-16 RX ADMIN — LOSARTAN POTASSIUM SCH MG: 50 TABLET, FILM COATED ORAL at 09:44

## 2020-10-16 RX ADMIN — Medication SCH DOSE: at 20:11

## 2020-10-16 RX ADMIN — POLYETHYLENE GLYCOL 3350 SCH PKT: 17 POWDER, FOR SOLUTION ORAL at 09:00

## 2020-10-16 RX ADMIN — Medication SCH DOSE: at 09:41

## 2020-10-16 RX ADMIN — ASPIRIN SCH MG: 81 TABLET ORAL at 20:08

## 2020-10-16 NOTE — IPNPDOC
PM&R Progress Note


DATE OF SERVICE:  Oct 16, 2020


Physiatrist Progress Note


DATE OF ADMISSION: Oct 7, 2020 at 16:50 


INPATIENT REHABILITATION ADMISSION DAY: #9 


DATE OF SERVICE: 10.16.2020





SUBJECTIVE:


Patient very confident with increasing strength and endurance. She has little 

discomfort in Sacral region and  ortho is to follow up imaging to monitor 

progression of healing. Noted labs last week for sl inc creatinine, have 

encouraged PO fluids, will recheck. Had another BM seems to be doing better with

the addition of PEG.





FUNCTIONAL STATUS: Min to SBA using flo.do, family training underway, 

working on WC mobility and making request for WC with removable arms, tip bars 

gel cushion for home use. Needs mod to max assist lower body dressing at WC 

level. Able to independently perform self care with setup.





REVIEW OF SYSTEMS: The following is a completed review of systems and has been 

reviewed. Review of systems otherwise unremarkable.


PAIN: Well controlled


EYES: No recent vision changes


EARS, NOSE, & THROAT: No throat pain, or dysphagia, or rhinorrhea


CARDIOVASCULAR: Denies chest pain or palpitations


PULMONARY: Denies shortness of breath


GASTROINTESTINAL: Chronic obstipation, improving with current dietary and 

medication sup


GENITOURINARY: denies dysuria


MUSCULOSKELETAL: +sacral fracture


NEUROLOGICAL: right sided paresis


HEMATOLOGICAL: denies easy bruising 


SKIN: right volar foot scar, still hypertrophic and some tenderness with 

pressure.


PSYCHIATRIC: less anxiety


All other review of systems found to be negative.





LABORATORY: See below. Encourage fluid intake, monitoring sl elevation of CR.





PHYSICAL EXAMINATION:


VITAL SIGNS: Please see below.


GENERAL: Pleasant and cooperative. 


HEENT: PERRL. Extraocular movements intact. Clear conjunctiva


CARDIOVASCULAR: Regular rate and rhythm. No murmurs, rubs, or gallops


LUNGS:  clear to auscultation bilaterally. No wheezes. No rhonchi


ABDOMEN: Soft, nontender, nondistended. Positive bowel sounds. Normal active 

bowel sounds


NEUROLOGICAL: Alert and oriented times three. Cranial nerves II through XII 

intact. Sensation grossly intact


EXTREMITIES: 5\5 strength bilateral upper extremities. 5/5 strength in left 5-/5

right lower extremity. No tremor or edema, Cassie's negative


SKIN: no rash, intact 





ASSESSMENT:75-year-old F with past medical history of MS, gait instability, who 

presents status post MS exacerbation with right sided weakness and with fall 

resulting in sacral fracture, making good progress and seems to be regaining 

strength daily.





PLAN:


1. Rehab-pT/OT advance mobility and ADLs- NWB bilat LE for 8 weeks since fall 

9-1-20 (per most recent ortho recs)


2.neuro- hx of MS with residual right sided weakness, monitor for worsening 

function, s/p recent course of solumedrol


-resume Avonex on d/c 


3. cardiac- hx of CHF, however not on diuretics, weight stable but slight 

increase 46.9 to 49kg, may reflect regular meals/intake or need for touch of 

diuretics, andreea shepherd.


-CAD s/p MI c/u ASA


-HTN c/u nebivolol and cozaar- medicine consulted to assist in overall 

management


4. resp- monitor for infection, encourage incentive spirometry


5. Ortho- s/p fall with most recent MRI confirming 9-19-20  "bilateral sacral 

insufficiency fractures as 


well as a fracture involving the S2 vertebral body", NWB for 8 weeks total 

starting from 9-1-20 per discussion with ortho team


6. DVT ppx-Lovenox 


7.  GI ppx- protonix


-Obstipation, did move today, declines many bowel meds, possibly PEG could be 

helpful 


8. Pain- tylenol and tramadol prn


9. - monitor for infection and do PVRs


10. Dispo- Home with 





Allergies


Coded Allergies:  


     METALS (Verified  Allergy, Intermediate, rash, 9/1/20)





Vital Signs





Vital Signs








  Date Time  Temp Pulse Resp B/P (MAP) Pulse Ox O2 Delivery O2 Flow Rate FiO2


 


10/16/20 09:45  76  150/78    


 


10/16/20 05:58 97.3  18  97 Room Air  











Current Medications


Current Medications





Current Medications








 Medications


  (Trade)  Dose


 Ordered  Sig/Vida


 Route


 PRN Reason  Start Time


 Stop Time Status Last Admin


Dose Admin


 


 Acetaminophen


  (Tylenol Tab)  1,000 mg  TID


 PO


   10/7/20 21:00


    10/16/20 09:40





 


 Ascorbic Acid


  (Vitamin C)  500 mg  DAILY


 PO


   10/8/20 09:00


 10/7/20 17:48 DC  





 


 Aspirin


  (Ecotrin)  81 mg  QHS


 PO


   10/7/20 21:00


    10/15/20 20:43





 


 Baclofen


  (Lioresal)  20 mg  QHS


 PO


   10/7/20 21:00


    10/15/20 20:43





 


 Calcium/Vitamin D


  (Oscal D)  1,000 mg  BID


 PO


   10/7/20 21:00


    10/16/20 09:40





 


 Coenzyme Q10


  (Coenzyme Q10)  100 mg  QHS


 PO


   10/7/20 21:00


 11/6/20 20:59  10/15/20 20:44





 


 Docusate Sodium


  (Colace)  100 mg  BID


 PO


   10/7/20 21:00


    10/16/20 09:39





 


 Emollient Cream


  (Vanicream)  APPLY TO


 RIGHT FOOT  BID


 TOP


   10/7/20 21:00


    10/16/20 09:41





 


 Enoxaparin Sodium


  (Lovenox)  30 mg  DAILY


 SC


   10/8/20 09:00


    10/16/20 09:39





 


 Enoxaparin Sodium


  (Lovenox)  40 mg  DAILY


 SC


   10/8/20 09:00


 10/7/20 17:47 DC  





 


 Glycerin


  (Glycerin Adult


 Suppository)  1 ea  DAILYPRN  PRN


 CA


 CONSTIPATION  10/12/20 17:45


 10/12/20 17:43 DC  





 


 Glycerin


  (Glycerin Adult


 Suppository)  1 ea  Q3DP  PRN


 CA


 CONSTIPATION  10/12/20 17:45


     





 


 Home Med


  (Med Rec


 Complete!)    ASDIRECTED


 XX


   10/7/20 17:45


 10/7/20 17:43 DC  





 


 Lactobacillus


 Acidophilus


  (Bacid)  1 ea  TID


 PO


   10/7/20 21:00


    10/16/20 09:39





 


 Losartan Potassium


  (Cozaar)  50 mg  QAM


 PO


   10/8/20 09:00


    10/16/20 09:44





 


 Multivitamins


  (Theragram-M)  1 tab  QHS


 PO


   10/7/20 21:00


    10/15/20 20:43





 


 Nebivolol


  (Bystolic)  5 mg  DAILY


 PO


   10/8/20 09:00


    10/16/20 09:45





 


 Pantoprazole


 Sodium


  (Protonix)  40 mg  DAILY


 PO


   10/8/20 09:00


    10/16/20 09:40





 


 Polyethylene


 Glycol


  (Miralax)  1 pkt  DAILY


 PO


   10/13/20 09:00


     





 


 Senna


  (Senokot)  1 tab  QHS


 PO


   10/7/20 21:00


    10/15/20 20:43





 


 Tramadol HCl


  (Ultram)  50 mg  Q4HP  PRN


 PO


 MODERATE PAIN (PS 5-7)  10/7/20 16:30


 10/14/20 16:29 DC 10/12/20 10:27





 


 Vitamin D


  (Vitamin D)  2,000 units  DAILY


 PO


   10/8/20 09:00


 10/7/20 17:48 LALA WHITE MD                Oct 16, 2020 09:53

## 2020-10-17 VITALS — SYSTOLIC BLOOD PRESSURE: 146 MMHG | DIASTOLIC BLOOD PRESSURE: 67 MMHG

## 2020-10-17 VITALS — DIASTOLIC BLOOD PRESSURE: 69 MMHG | SYSTOLIC BLOOD PRESSURE: 143 MMHG

## 2020-10-17 VITALS — SYSTOLIC BLOOD PRESSURE: 142 MMHG | DIASTOLIC BLOOD PRESSURE: 70 MMHG

## 2020-10-17 VITALS — SYSTOLIC BLOOD PRESSURE: 124 MMHG | DIASTOLIC BLOOD PRESSURE: 68 MMHG

## 2020-10-17 RX ADMIN — Medication SCH MG: at 21:09

## 2020-10-17 RX ADMIN — WHITE PETROLATUM SCH DOSE: 57; 17 PASTE TOPICAL at 21:00

## 2020-10-17 RX ADMIN — WHITE PETROLATUM SCH DOSE: 57; 17 PASTE TOPICAL at 15:23

## 2020-10-17 RX ADMIN — BACLOFEN SCH MG: 10 TABLET ORAL at 21:09

## 2020-10-17 RX ADMIN — SENNOSIDES SCH TAB: 8.6 TABLET, FILM COATED ORAL at 21:08

## 2020-10-17 RX ADMIN — WHITE PETROLATUM SCH DOSE: 57; 17 PASTE TOPICAL at 10:23

## 2020-10-17 RX ADMIN — PROBIOTIC PRODUCT - TAB SCH EA: TAB at 21:09

## 2020-10-17 RX ADMIN — Medication SCH MG: at 10:20

## 2020-10-17 RX ADMIN — PROBIOTIC PRODUCT - TAB SCH EA: TAB at 10:19

## 2020-10-17 RX ADMIN — PANTOPRAZOLE SODIUM SCH MG: 40 TABLET, DELAYED RELEASE ORAL at 10:20

## 2020-10-17 RX ADMIN — ENOXAPARIN SODIUM SCH MG: 30 INJECTION SUBCUTANEOUS at 10:19

## 2020-10-17 RX ADMIN — ACETAMINOPHEN SCH MG: 500 TABLET ORAL at 09:00

## 2020-10-17 RX ADMIN — LOSARTAN POTASSIUM SCH MG: 50 TABLET, FILM COATED ORAL at 10:21

## 2020-10-17 RX ADMIN — ASPIRIN SCH MG: 81 TABLET ORAL at 21:08

## 2020-10-17 RX ADMIN — DOCUSATE SODIUM SCH MG: 100 CAPSULE, LIQUID FILLED ORAL at 10:20

## 2020-10-17 RX ADMIN — PROBIOTIC PRODUCT - TAB SCH EA: TAB at 15:23

## 2020-10-17 RX ADMIN — NEBIVOLOL HYDROCHLORIDE SCH MG: 5 TABLET ORAL at 10:20

## 2020-10-17 RX ADMIN — Medication SCH DOSE: at 21:14

## 2020-10-17 RX ADMIN — Medication SCH DOSE: at 10:24

## 2020-10-17 RX ADMIN — DOCUSATE SODIUM SCH MG: 100 CAPSULE, LIQUID FILLED ORAL at 21:09

## 2020-10-17 RX ADMIN — MULTIPLE VITAMINS W/ MINERALS TAB SCH TAB: TAB at 21:08

## 2020-10-17 RX ADMIN — ACETAMINOPHEN SCH MG: 500 TABLET ORAL at 15:24

## 2020-10-17 RX ADMIN — POLYETHYLENE GLYCOL 3350 SCH PKT: 17 POWDER, FOR SOLUTION ORAL at 10:19

## 2020-10-17 RX ADMIN — ACETAMINOPHEN SCH MG: 500 TABLET ORAL at 21:10

## 2020-10-18 VITALS — DIASTOLIC BLOOD PRESSURE: 70 MMHG | SYSTOLIC BLOOD PRESSURE: 130 MMHG

## 2020-10-18 VITALS — DIASTOLIC BLOOD PRESSURE: 61 MMHG | SYSTOLIC BLOOD PRESSURE: 131 MMHG

## 2020-10-18 VITALS — SYSTOLIC BLOOD PRESSURE: 138 MMHG | DIASTOLIC BLOOD PRESSURE: 64 MMHG

## 2020-10-18 VITALS — SYSTOLIC BLOOD PRESSURE: 126 MMHG | DIASTOLIC BLOOD PRESSURE: 61 MMHG

## 2020-10-18 RX ADMIN — PROBIOTIC PRODUCT - TAB SCH EA: TAB at 21:14

## 2020-10-18 RX ADMIN — DOCUSATE SODIUM SCH MG: 100 CAPSULE, LIQUID FILLED ORAL at 21:14

## 2020-10-18 RX ADMIN — Medication SCH MG: at 21:14

## 2020-10-18 RX ADMIN — ACETAMINOPHEN SCH MG: 500 TABLET ORAL at 09:00

## 2020-10-18 RX ADMIN — WHITE PETROLATUM SCH DOSE: 57; 17 PASTE TOPICAL at 15:20

## 2020-10-18 RX ADMIN — PANTOPRAZOLE SODIUM SCH MG: 40 TABLET, DELAYED RELEASE ORAL at 10:05

## 2020-10-18 RX ADMIN — Medication SCH DOSE: at 10:06

## 2020-10-18 RX ADMIN — ASPIRIN SCH MG: 81 TABLET ORAL at 21:14

## 2020-10-18 RX ADMIN — WHITE PETROLATUM SCH DOSE: 57; 17 PASTE TOPICAL at 21:00

## 2020-10-18 RX ADMIN — Medication SCH MG: at 10:05

## 2020-10-18 RX ADMIN — LOSARTAN POTASSIUM SCH MG: 50 TABLET, FILM COATED ORAL at 10:06

## 2020-10-18 RX ADMIN — WHITE PETROLATUM SCH DOSE: 57; 17 PASTE TOPICAL at 10:07

## 2020-10-18 RX ADMIN — Medication SCH MG: at 21:15

## 2020-10-18 RX ADMIN — NEBIVOLOL HYDROCHLORIDE SCH MG: 5 TABLET ORAL at 10:05

## 2020-10-18 RX ADMIN — PROBIOTIC PRODUCT - TAB SCH EA: TAB at 10:04

## 2020-10-18 RX ADMIN — SENNOSIDES SCH TAB: 8.6 TABLET, FILM COATED ORAL at 21:14

## 2020-10-18 RX ADMIN — ACETAMINOPHEN SCH MG: 500 TABLET ORAL at 15:21

## 2020-10-18 RX ADMIN — BACLOFEN SCH MG: 10 TABLET ORAL at 21:14

## 2020-10-18 RX ADMIN — DOCUSATE SODIUM SCH MG: 100 CAPSULE, LIQUID FILLED ORAL at 10:05

## 2020-10-18 RX ADMIN — MULTIPLE VITAMINS W/ MINERALS TAB SCH TAB: TAB at 21:14

## 2020-10-18 RX ADMIN — PROBIOTIC PRODUCT - TAB SCH EA: TAB at 15:20

## 2020-10-18 RX ADMIN — Medication SCH DOSE: at 21:07

## 2020-10-18 RX ADMIN — ENOXAPARIN SODIUM SCH MG: 30 INJECTION SUBCUTANEOUS at 10:05

## 2020-10-18 RX ADMIN — ACETAMINOPHEN SCH MG: 500 TABLET ORAL at 21:15

## 2020-10-18 RX ADMIN — POLYETHYLENE GLYCOL 3350 SCH PKT: 17 POWDER, FOR SOLUTION ORAL at 10:04

## 2020-10-19 VITALS — SYSTOLIC BLOOD PRESSURE: 123 MMHG | DIASTOLIC BLOOD PRESSURE: 59 MMHG

## 2020-10-19 VITALS — SYSTOLIC BLOOD PRESSURE: 142 MMHG | DIASTOLIC BLOOD PRESSURE: 80 MMHG

## 2020-10-19 VITALS — SYSTOLIC BLOOD PRESSURE: 166 MMHG | DIASTOLIC BLOOD PRESSURE: 84 MMHG

## 2020-10-19 RX ADMIN — ENOXAPARIN SODIUM SCH MG: 30 INJECTION SUBCUTANEOUS at 08:40

## 2020-10-19 RX ADMIN — NEBIVOLOL HYDROCHLORIDE SCH MG: 5 TABLET ORAL at 08:44

## 2020-10-19 RX ADMIN — Medication SCH MG: at 08:41

## 2020-10-19 RX ADMIN — WHITE PETROLATUM SCH DOSE: 57; 17 PASTE TOPICAL at 08:45

## 2020-10-19 RX ADMIN — BACLOFEN SCH MG: 10 TABLET ORAL at 20:42

## 2020-10-19 RX ADMIN — WHITE PETROLATUM SCH DOSE: 57; 17 PASTE TOPICAL at 20:44

## 2020-10-19 RX ADMIN — PROBIOTIC PRODUCT - TAB SCH EA: TAB at 20:41

## 2020-10-19 RX ADMIN — ACETAMINOPHEN SCH MG: 500 TABLET ORAL at 08:41

## 2020-10-19 RX ADMIN — Medication SCH DOSE: at 20:44

## 2020-10-19 RX ADMIN — PROBIOTIC PRODUCT - TAB SCH EA: TAB at 08:41

## 2020-10-19 RX ADMIN — Medication SCH MG: at 20:41

## 2020-10-19 RX ADMIN — Medication SCH DOSE: at 08:45

## 2020-10-19 RX ADMIN — POLYETHYLENE GLYCOL 3350 SCH PKT: 17 POWDER, FOR SOLUTION ORAL at 08:41

## 2020-10-19 RX ADMIN — ACETAMINOPHEN SCH MG: 500 TABLET ORAL at 16:00

## 2020-10-19 RX ADMIN — PROBIOTIC PRODUCT - TAB SCH EA: TAB at 17:13

## 2020-10-19 RX ADMIN — DOCUSATE SODIUM SCH MG: 100 CAPSULE, LIQUID FILLED ORAL at 08:41

## 2020-10-19 RX ADMIN — PANTOPRAZOLE SODIUM SCH MG: 40 TABLET, DELAYED RELEASE ORAL at 08:41

## 2020-10-19 RX ADMIN — LOSARTAN POTASSIUM SCH MG: 50 TABLET, FILM COATED ORAL at 08:44

## 2020-10-19 RX ADMIN — SENNOSIDES SCH TAB: 8.6 TABLET, FILM COATED ORAL at 20:42

## 2020-10-19 RX ADMIN — DOCUSATE SODIUM SCH MG: 100 CAPSULE, LIQUID FILLED ORAL at 20:42

## 2020-10-19 RX ADMIN — ACETAMINOPHEN SCH MG: 500 TABLET ORAL at 20:43

## 2020-10-19 RX ADMIN — MULTIPLE VITAMINS W/ MINERALS TAB SCH TAB: TAB at 20:42

## 2020-10-19 RX ADMIN — Medication SCH MG: at 20:42

## 2020-10-19 RX ADMIN — ASPIRIN SCH MG: 81 TABLET ORAL at 20:41

## 2020-10-19 RX ADMIN — WHITE PETROLATUM SCH DOSE: 57; 17 PASTE TOPICAL at 16:00

## 2020-10-19 NOTE — IPNPDOC
PM&R Progress Note


DATE OF SERVICE:  Oct 19, 2020


Physiatrist Progress Note


DATE OF ADMISSION: Oct 7, 2020 at 16:50 


DATE OF SERVICE: Oct 19, 2020


INPATIENT REHABILITATION ADMISSION DAY: #12 





SUBJECTIVE:


Patient notes has little discomfort in Sacral region now, feels she is finally 

getting the hang on the Beasy board transfers and maintaining her balance. Ortho

is to follow up imaging to monitor progression of healing. We again discussed 

labs last week for sl inc creatinine, have encouraged PO fluids, will recheck. 

BM seems to be doing better with the addition of PEG and pain management is 

excellent.





FUNCTIONAL STATUS:   Worked on improving sitting balance with UE movements CGA 

to SBA using beasy board bed to chair with assist of one,and Mod A chair to bed.

Family training underway, working on WC mobility 100 feet with verbal cues for 

hand placement . We are making request for WC with removable arms, tip bars gel 

cushion for home use. Needs mod to max assist lower body dressing at WC level, 

with known improving. Sitting balance and coordination.





REVIEW OF SYSTEMS: The following is a completed review of systems and has been 

reviewed. Review of systems otherwise unremarkable.


PAIN: Well controlled


EYES: No recent vision changes


EARS, NOSE, & THROAT: No throat pain, or dysphagia, or rhinorrhea


CARDIOVASCULAR: Denies chest pain or palpitations


PULMONARY: Denies shortness of breath


GASTROINTESTINAL: Chronic obstipation, improving with current dietary and 

medication sup


GENITOURINARY: denies dysuria


MUSCULOSKELETAL: +sacral fracture


NEUROLOGICAL: right sided paresis


HEMATOLOGICAL: denies easy bruising 


SKIN: right volar foot scar, still hypertrophic and some tenderness with 

pressure.


PSYCHIATRIC: less anxiety


All other review of systems found to be negative.





LABORATORY: See below. Encourage fluid intake, monitoring sl elevation of CR.





PHYSICAL EXAMINATION:


VITAL SIGNS: Please see below.


GENERAL: Pleasant and cooperative. 


HEENT: PERRL. Extraocular movements intact. Clear conjunctiva


CARDIOVASCULAR: Regular rate and rhythm. No murmurs, rubs, or gallops


LUNGS:  clear to auscultation bilaterally. No wheezes. No rhonchi


ABDOMEN: Soft, nontender, nondistended. Positive bowel sounds. Normal active 

bowel sounds


NEUROLOGICAL: Alert and oriented times three. Cranial nerves II through XII 

intact. Sensation grossly intact. 


EXTREMITIES: 5\5 strength bilateral upper extremities. 5/5 strength in left 5-/5

right lower extremity. No tremor or edema, Cassie's negative, challenges are more

with coordination and endurance of the right upper and lower extremity than 

strength now.


SKIN: no rash, intact 





ASSESSMENT:75-year-old F with past medical history of MS, gait instability, who 

presents status post MS exacerbation with right sided weakness and with fall 

resulting in sacral fracture, making good progress and seems to be regaining 

strength daily.





PLAN:


1. Rehab-pT/OT advance mobility and ADLs- NWB bilat LE for 8 weeks since fall 

9-1-20 (per most recent ortho recs)


2.neuro- hx of MS with residual right sided weakness, monitor for worsening 

function, s/p recent course of solumedrol


-resume Avonex on d/c 


3. cardiac- hx of CHF, however not on diuretics, weight stable but slight 

increase 46.9 to 49kg, may reflect regular meals/intake or need for touch of 

diuretics, will montor, holding steady 49.2 with slight increase.


-CAD s/p MI c/u ASA


-HTN c/u nebivolol and cozaar- medicine consulted to assist in overall 

management


4. resp- monitor for infection, encourage incentive spirometry


5. Ortho- s/p fall with most recent MRI confirming 9-19-20  "bilateral sacral 

insufficiency fractures as 


well as a fracture involving the S2 vertebral body", NWB for 8 weeks total 

starting from 9-1-20 per discussion with ortho team


6. DVT ppx-Lovenox 


7.  GI ppx- protonix


-Obstipation, did move today, declines many bowel meds, possibly PEG could be 

helpful 


8. Pain- tylenol and tramadol prn


9. - monitor for infection and do PVRs


10. Dispo- Home with 





Allergies


Coded Allergies:  


     METALS (Verified  Allergy, Intermediate, rash, 9/1/20)





Vital Signs





Vital Signs








  Date Time  Temp Pulse Resp B/P (MAP) Pulse Ox O2 Delivery O2 Flow Rate FiO2


 


10/19/20 08:44  64  142/62    


 


10/19/20 05:55 97.8  18  98 Room Air  











Current Medications


Current Medications





Current Medications








 Medications


  (Trade)  Dose


 Ordered  Sig/Vida


 Route


 PRN Reason  Start Time


 Stop Time Status Last Admin


Dose Admin


 


 Acetaminophen


  (Tylenol Tab)  1,000 mg  TID


 PO


   10/7/20 21:00


    10/18/20 21:15





 


 Ascorbic Acid


  (Vitamin C)  500 mg  DAILY


 PO


   10/8/20 09:00


 10/7/20 17:48 DC  





 


 Aspirin


  (Ecotrin)  81 mg  QHS


 PO


   10/7/20 21:00


    10/18/20 21:14





 


 Baclofen


  (Lioresal)  20 mg  QHS


 PO


   10/7/20 21:00


    10/18/20 21:14





 


 Calcium/Vitamin D


  (Oscal D)  1,000 mg  BID


 PO


   10/7/20 21:00


    10/19/20 08:41





 


 Coenzyme Q10


  (Coenzyme Q10)  100 mg  QHS


 PO


   10/7/20 21:00


 11/6/20 20:59  10/18/20 21:14





 


 Docusate Sodium


  (Colace)  100 mg  BID


 PO


   10/7/20 21:00


    10/19/20 08:41





 


 Emollient Cream


  (Vanicream)  APPLY TO


 RIGHT FOOT  BID


 TOP


   10/7/20 21:00


    10/19/20 08:45





 


 Enoxaparin Sodium


  (Lovenox)  30 mg  DAILY


 SC


   10/8/20 09:00


    10/19/20 08:40





 


 Enoxaparin Sodium


  (Lovenox)  40 mg  DAILY


 SC


   10/8/20 09:00


 10/7/20 17:47 DC  





 


 Glycerin


  (Glycerin Adult


 Suppository)  1 ea  DAILYPRN  PRN


 ME


 CONSTIPATION  10/12/20 17:45


 10/12/20 17:43 DC  





 


 Glycerin


  (Glycerin Adult


 Suppository)  1 ea  Q3DP  PRN


 ME


 CONSTIPATION  10/12/20 17:45


     





 


 Home Med


  (Med Rec


 Complete!)    ASDIRECTED


 XX


   10/7/20 17:45


 10/7/20 17:43 DC  





 


 Lactobacillus


 Acidophilus


  (Bacid)  1 ea  TID


 PO


   10/7/20 21:00


    10/19/20 08:41





 


 Losartan Potassium


  (Cozaar)  50 mg  QAM


 PO


   10/8/20 09:00


    10/19/20 08:44





 


 Multivitamins


  (Theragram-M)  1 tab  QHS


 PO


   10/7/20 21:00


    10/18/20 21:14





 


 Nebivolol


  (Bystolic)  5 mg  DAILY


 PO


   10/8/20 09:00


    10/19/20 08:44





 


 Pantoprazole


 Sodium


  (Protonix)  40 mg  DAILY


 PO


   10/8/20 09:00


    10/19/20 08:41





 


 Polyethylene


 Glycol


  (Miralax)  1 pkt  DAILY


 PO


   10/13/20 09:00


    10/18/20 10:04





 


 Senna


  (Senokot)  1 tab  QHS


 PO


   10/7/20 21:00


    10/18/20 21:14





 


 Tramadol HCl


  (Ultram)  50 mg  Q4HP  PRN


 PO


 MODERATE PAIN (PS 5-7)  10/7/20 16:30


 10/14/20 16:29 DC 10/12/20 10:27





 


 Vitamin D


  (Vitamin D)  2,000 units  DAILY


 PO


   10/8/20 09:00


 10/7/20 17:48 DC  




















LALA MESA MD                Oct 19, 2020 09:45

## 2020-10-20 VITALS — SYSTOLIC BLOOD PRESSURE: 116 MMHG | DIASTOLIC BLOOD PRESSURE: 62 MMHG

## 2020-10-20 VITALS — SYSTOLIC BLOOD PRESSURE: 125 MMHG | DIASTOLIC BLOOD PRESSURE: 58 MMHG

## 2020-10-20 VITALS — DIASTOLIC BLOOD PRESSURE: 67 MMHG | SYSTOLIC BLOOD PRESSURE: 154 MMHG

## 2020-10-20 RX ADMIN — DOCUSATE SODIUM SCH MG: 100 CAPSULE, LIQUID FILLED ORAL at 10:15

## 2020-10-20 RX ADMIN — ENOXAPARIN SODIUM SCH MG: 30 INJECTION SUBCUTANEOUS at 10:13

## 2020-10-20 RX ADMIN — Medication SCH MG: at 10:14

## 2020-10-20 RX ADMIN — POLYETHYLENE GLYCOL 3350 SCH PKT: 17 POWDER, FOR SOLUTION ORAL at 09:00

## 2020-10-20 RX ADMIN — LOSARTAN POTASSIUM SCH MG: 50 TABLET, FILM COATED ORAL at 10:15

## 2020-10-20 RX ADMIN — PROBIOTIC PRODUCT - TAB SCH EA: TAB at 10:14

## 2020-10-20 RX ADMIN — ACETAMINOPHEN SCH MG: 500 TABLET ORAL at 09:00

## 2020-10-20 RX ADMIN — Medication SCH DOSE: at 10:16

## 2020-10-20 RX ADMIN — NEBIVOLOL HYDROCHLORIDE SCH MG: 5 TABLET ORAL at 10:14

## 2020-10-20 RX ADMIN — PANTOPRAZOLE SODIUM SCH MG: 40 TABLET, DELAYED RELEASE ORAL at 10:15

## 2020-10-20 RX ADMIN — WHITE PETROLATUM SCH DOSE: 57; 17 PASTE TOPICAL at 09:00

## 2020-10-20 NOTE — DS.PDOC
PM&R Discharge Summary


Physiatrist Discharge Note


Leigh De Leon


DATE OF ADMISSION: Oct 5, 2020 at 18:30 


DATE OF DISCHARGE: 10.20.2020   





DISCHARGE DIAGNOSES:


1. Unstable pelvic fracture.


2. Multiple sclerosis.


3., Right hemiparesis.


4., Neurogenic bladder with UTI.


5. Gait instability.


6. Hypotension


7. Anxiety





PAST MEDICAL HISTORY:


1. CKD3.


2., Hypertension.


3., Diabetes.


4., Congestive heart failure.


5.  Coronary artery disease status post history of MI


 


PAST SURGICAL HISTORY:


1. Right sided CEA.


2. Tubal ligation.


3. Tonsillectomy





HOSPITAL COURSE: This is a 75-year-old previously independent lady with history 

of stable multiple sclerosis who apparently had an exacerbation, fell, 

fracturing her sacrum and left iliac wing rendering her non-weight-bearing per 

orthopedics. She was admitted to comprehensive rehabilitation October 7. She 

underwent interventions with PT, OT, speech and language rehabilitative nursing 

and required careful coordination of her complex medical problems. Right 

hemiparesis gradually improved with strengthening and stretching exercises. 

However, coordination issues and balance issues, trunk stability remain a 

challenge. Some improvement eventually obtained with use of the BZ board for 

efficient, safe slide board transfers. Due to nonweightbearing status and 

decrease physical endurance related to the underlying MS and right hemiparesis, 

it was ascertained that she would require the use of a properly fitted manual 

wheelchair with removable desk arms to accomodate the required slide board for 

safe transfers. She is of small stature, has a chronic neurological degenerative

disorder anticipated to progress and has anticipated protracted mobility 

limitations that will significantly impair her ability to participate in 

mobility related activities of daily living such as toileting, dressing, 

grooming and bathing and customary locations in the home. There is adequate 

access in the home for her to be able to utilize a wheelchair maneuver between 

rooms and avoid remaining bedridden, which can only contribute to further 

decline worsening fatigue from her underlying MS.





PHYSICAL EXAMINATION:


VITAL SIGNS: Please see below.


GENERAL: Pleasant and cooperative. 


HEENT: PERRL. Extraocular movements intact. Clear conjunctiva


CARDIOVASCULAR: Regular rate and rhythm. No murmurs, rubs, or gallops


LUNGS:  clear to auscultation bilaterally. No wheezes. No rhonchi


ABDOMEN: Soft, nontender, nondistended. Positive bowel sounds. Normal active 

bowel sounds


NEUROLOGICAL: Alert and oriented times three. Cranial nerves II through XII 

intact. Sensation grossly intact. 


EXTREMITIES: 5\5 strength bilateral upper extremities, tremor and pass pointing 

noted on right upper extremity with repetitive motions. 5/5 strength in left 5-

/5 right lower extremity. No edema, Cassie's negative, challenges are more with 

coordination and endurance of the right upper and lower extremity than strength.


SKIN: no rash, intact





FUNCTIONAL STATUS AT DISCHARGE:


Patient is nonweightbearing due to orthopedic fractures, requires contact-guard 

assistance with use of BZ board transfer, minimal assistance to go from lower to

higher level. Able to mobilize wheelchair to 150 feet, slowly with standby 

assistance. Toilet transfers with contact guard assistance. Upper body dressing 

independent with set up





LABORATORY DATA: Please see below. 





ALLERGIES: See below.





MEDICATIONS: See Below.





DISCHARGE DISPOSITION: Home with  at a wheelchair level. Will need 

follow-up with home health continued work with PT, OT and progression of 

mobility skills as per orthopedics. Will need follow up with neurologist 

regarding resumption of her MS medication regimen. Will need follow-up with 

primary M.D. regarding her ongoing general medical issues including renal 

function, cardiovascular, endocrine issues.





45 Minutes spent in examination, re-examination kelsey t review and preparation





Vital Signs/I&O





Vital Sign - Last 24 Hours








 10/19/20 10/19/20 10/20/20 10/20/20





 14:00 20:00 06:19 10:14


 


Temp 98.1 99.2 99.0 


 


Pulse 65 75 65 62


 


Resp 20 18 18 


 


B/P (MAP) 123/59 (80) 142/80 (100) 125/58 (80) 154/67


 


Pulse Ox 94 96 95 


 


O2 Delivery Room Air Room Air Room Air 


 


    





 10/20/20   





 10:15   


 


B/P (MAP) 154/67   














I&O- Last 24 Hours up to 6 AM 


 


 10/20/20





 06:00


 


Intake Total 600 ml


 


Output Total 0 ml


 


Balance 600 ml











Medications


Medications





Current Medications








 Medications


  (Trade)  Dose


 Ordered  Sig/Vida


 Route


 PRN Reason  Start Time


 Stop Time Status Last Admin


Dose Admin


 


 Acetaminophen


  (Tylenol Tab)  1,000 mg  TID


 PO


   10/7/20 21:00


    10/18/20 21:15





 


 Ascorbic Acid


  (Vitamin C)  500 mg  DAILY


 PO


   10/8/20 09:00


 10/7/20 17:48 DC  





 


 Aspirin


  (Ecotrin)  81 mg  QHS


 PO


   10/7/20 21:00


    10/19/20 20:41





 


 Baclofen


  (Lioresal)  20 mg  QHS


 PO


   10/7/20 21:00


    10/19/20 20:42





 


 Calcium/Vitamin D


  (Oscal D)  1,000 mg  BID


 PO


   10/7/20 21:00


    10/20/20 10:14





 


 Coenzyme Q10


  (Coenzyme Q10)  100 mg  QHS


 PO


   10/7/20 21:00


 11/6/20 20:59  10/19/20 20:41





 


 Docusate Sodium


  (Colace)  100 mg  BID


 PO


   10/7/20 21:00


    10/20/20 10:15





 


 Emollient Cream


  (Vanicream)  APPLY TO


 RIGHT FOOT  BID


 TOP


   10/7/20 21:00


    10/20/20 10:16





 


 Enoxaparin Sodium


  (Lovenox)  30 mg  DAILY


 SC


   10/8/20 09:00


    10/20/20 10:13





 


 Enoxaparin Sodium


  (Lovenox)  40 mg  DAILY


 SC


   10/8/20 09:00


 10/7/20 17:47 DC  





 


 Glycerin


  (Glycerin Adult


 Suppository)  1 ea  DAILYPRN  PRN


 NC


 CONSTIPATION  10/12/20 17:45


 10/12/20 17:43 DC  





 


 Glycerin


  (Glycerin Adult


 Suppository)  1 ea  Q3DP  PRN


 NC


 CONSTIPATION  10/12/20 17:45


     





 


 Home Med


  (Med Rec


 Complete!)    ASDIRECTED


 XX


   10/7/20 17:45


 10/7/20 17:43 DC  





 


 Lactobacillus


 Acidophilus


  (Bacid)  1 ea  TID


 PO


   10/7/20 21:00


    10/20/20 10:14





 


 Losartan Potassium


  (Cozaar)  50 mg  QAM


 PO


   10/8/20 09:00


    10/20/20 10:15





 


 Multivitamins


  (Theragram-M)  1 tab  QHS


 PO


   10/7/20 21:00


    10/19/20 20:42





 


 Nebivolol


  (Bystolic)  5 mg  DAILY


 PO


   10/8/20 09:00


    10/20/20 10:14





 


 Pantoprazole


 Sodium


  (Protonix)  40 mg  DAILY


 PO


   10/8/20 09:00


    10/20/20 10:15





 


 Polyethylene


 Glycol


  (Miralax)  1 pkt  DAILY


 PO


   10/13/20 09:00


    10/18/20 10:04





 


 Senna


  (Senokot)  1 tab  QHS


 PO


   10/7/20 21:00


    10/19/20 20:42





 


 Tramadol HCl


  (Ultram)  50 mg  Q4HP  PRN


 PO


 MODERATE PAIN (PS 5-7)  10/7/20 16:30


 10/14/20 16:29 DC 10/12/20 10:27





 


 Vitamin D


  (Vitamin D)  2,000 units  DAILY


 PO


   10/8/20 09:00


 10/7/20 17:48 DC  











Scheduled


Aspirin (Aspirin EC) 81 Mg Tablet.dr, 81 MG PO QHS, (Reported)


Baclofen (Baclofen) 20 Mg Tablet, 20 MG PO QHS, (Reported)


Calcium/Vitamin D (Calcium 500-Vit D3 200 Tablet) 1 Each Tablet, 1,000 MG PO BID


Cranberry (Cranberry) 400 Mg Capsule, 400 MG PO QHS, (Reported)


Interferon Beta-1A (Avonex) 30 Mcg/0.5 Ml Syringekit, 30 MCG IM QWEEK, 

(Reported)


   TUESDAYS 


Lactobacillus Combo No.10 (Probiotic) 1 Each Capsule, 1 TAB PO DAILY, (Reported)


Losartan Potassium (Cozaar) 50 Mg Tablet, 50 MG PO DAILY


Multivit-Min/FA/Lycopen/Lutein (Centrum Silver Tablet) 1 Each Tablet, 1 TAB PO 

QHS, (Reported)


Nebivolol HCl (Bystolic) 5 Mg Tab, 5 MG PO DAILY, (Reported)


Ubidecarenone/Vit E Acet (Co Q-10 100 mg Softgel) 1 Each Capsule, 100 MG PO QHS,

(Reported)





Scheduled PRN


Polyethylene Glycol 3350 (Polyethylene Glycol 3350) 17 Gm Powd.pack, 1 PKT PO 

DAILYPRN PRN for CONSTIPATION


Tramadol HCl (Tramadol HCl) 50 Mg Tablet, 50 MG PO Q6HP PRN for MODERATE PAIN 

(PS 5-7)





Allergies


Coded Allergies:  


     METALS (Verified  Allergy, Intermediate, rash, 9/1/20)











LALA MESA MD                Oct 20, 2020 11:30

## 2020-10-21 ENCOUNTER — HOSPITAL ENCOUNTER (INPATIENT)
Dept: HOSPITAL 53 - M ED | Age: 75
LOS: 2 days | Discharge: HOME HEALTH | DRG: 690 | End: 2020-10-23
Admitting: INTERNAL MEDICINE
Payer: MEDICARE

## 2020-10-21 VITALS — WEIGHT: 108.03 LBS | BODY MASS INDEX: 22.68 KG/M2 | HEIGHT: 58 IN

## 2020-10-21 DIAGNOSIS — I50.9: ICD-10-CM

## 2020-10-21 DIAGNOSIS — I13.0: ICD-10-CM

## 2020-10-21 DIAGNOSIS — Z79.899: ICD-10-CM

## 2020-10-21 DIAGNOSIS — B96.29: ICD-10-CM

## 2020-10-21 DIAGNOSIS — F41.9: ICD-10-CM

## 2020-10-21 DIAGNOSIS — N39.0: Primary | ICD-10-CM

## 2020-10-21 DIAGNOSIS — I25.2: ICD-10-CM

## 2020-10-21 DIAGNOSIS — G35: ICD-10-CM
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
## 2020-10-21 DIAGNOSIS — Z87.891: ICD-10-CM

## 2020-10-21 DIAGNOSIS — I25.10: ICD-10-CM

## 2020-10-21 DIAGNOSIS — N18.30: ICD-10-CM
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
## 2020-10-21 DIAGNOSIS — S32.9XXA: ICD-10-CM

## 2020-10-21 LAB
ALBUMIN SERPL BCG-MCNC: 2.9 GM/DL (ref 3.2–5.2)
ALT SERPL W P-5'-P-CCNC: 51 U/L (ref 12–78)
APPEARANCE UR: (no result)
BACTERIA UR QL AUTO: (no result)
BASOPHILS # BLD AUTO: 0.1 10^3/UL (ref 0–0.2)
BASOPHILS NFR BLD AUTO: 0.2 % (ref 0–1)
BILIRUB CONJ SERPL-MCNC: 0.1 MG/DL (ref 0–0.2)
BILIRUB SERPL-MCNC: 0.5 MG/DL (ref 0.2–1)
BILIRUB UR QL STRIP.AUTO: NEGATIVE
BUN SERPL-MCNC: 24 MG/DL (ref 7–18)
CALCIUM SERPL-MCNC: 9.5 MG/DL (ref 8.8–10.2)
CHLORIDE SERPL-SCNC: 109 MEQ/L (ref 98–107)
CO2 SERPL-SCNC: 27 MEQ/L (ref 21–32)
CREAT SERPL-MCNC: 1.31 MG/DL (ref 0.55–1.3)
EOSINOPHIL # BLD AUTO: 0 10^3/UL (ref 0–0.5)
EOSINOPHIL NFR BLD AUTO: 0.1 % (ref 0–3)
GFR SERPL CREATININE-BSD FRML MDRD: 42.3 ML/MIN/{1.73_M2} (ref 39–?)
GLUCOSE SERPL-MCNC: 107 MG/DL (ref 70–100)
GLUCOSE UR QL STRIP.AUTO: NEGATIVE MG/DL
HCT VFR BLD AUTO: 37.8 % (ref 36–47)
HGB BLD-MCNC: 12 G/DL (ref 12–15.5)
HGB UR QL STRIP.AUTO: NEGATIVE
KETONES UR QL STRIP.AUTO: NEGATIVE MG/DL
LEUKOCYTE ESTERASE UR QL STRIP.AUTO: (no result)
LYMPHOCYTES # BLD AUTO: 0.9 10^3/UL (ref 1.5–5)
LYMPHOCYTES NFR BLD AUTO: 3.8 % (ref 24–44)
MCH RBC QN AUTO: 31.8 PG (ref 27–33)
MCHC RBC AUTO-ENTMCNC: 31.7 G/DL (ref 32–36.5)
MCV RBC AUTO: 100.3 FL (ref 80–96)
MONOCYTES # BLD AUTO: 1.6 10^3/UL (ref 0–0.8)
MONOCYTES NFR BLD AUTO: 6.9 % (ref 0–5)
MUCOUS THREADS URNS QL MICRO: (no result)
NEUTROPHILS # BLD AUTO: 19.6 10^3/UL (ref 1.5–8.5)
NEUTROPHILS NFR BLD AUTO: 87.7 % (ref 36–66)
NITRITE UR QL STRIP.AUTO: POSITIVE
PH UR STRIP.AUTO: 5 UNITS (ref 5–9)
PLATELET # BLD AUTO: 214 10^3/UL (ref 150–450)
POTASSIUM SERPL-SCNC: 4 MEQ/L (ref 3.5–5.1)
PROT SERPL-MCNC: 5.8 GM/DL (ref 6.4–8.2)
PROT UR QL STRIP.AUTO: (no result) MG/DL
RBC # BLD AUTO: 3.77 10^6/UL (ref 4–5.4)
RBC # UR AUTO: 3 /HPF (ref 0–3)
SODIUM SERPL-SCNC: 143 MEQ/L (ref 136–145)
SP GR UR STRIP.AUTO: 1.01 (ref 1–1.03)
SQUAMOUS #/AREA URNS AUTO: 1 /HPF (ref 0–6)
TRANS CELLS #/AREA URNS HPF: 1 /HPF
UROBILINOGEN UR QL STRIP.AUTO: 0.2 MG/DL (ref 0–2)
WBC # BLD AUTO: 22.4 10^3/UL (ref 4–10)
WBC #/AREA URNS AUTO: 16 /HPF (ref 0–3)

## 2020-10-21 RX ADMIN — SODIUM CHLORIDE SCH MLS/HR: 9 INJECTION, SOLUTION INTRAVENOUS at 17:43

## 2020-10-21 RX ADMIN — PROBIOTIC PRODUCT - TAB SCH EA: TAB at 23:52

## 2020-10-21 RX ADMIN — DOCUSATE SODIUM SCH MG: 100 CAPSULE, LIQUID FILLED ORAL at 23:52

## 2020-10-21 RX ADMIN — BACLOFEN SCH MG: 10 TABLET ORAL at 23:52

## 2020-10-21 RX ADMIN — ASPIRIN SCH MG: 81 TABLET ORAL at 23:52

## 2020-10-21 NOTE — REPVR
PROCEDURE INFORMATION: 

Exam: XR Chest, 1 View 

Exam date and time: 10/21/2020 6:38 PM 

Age: 75 years old 

Clinical indication: Chest pain; Additional info: Sepsis/shock 



TECHNIQUE: 

Imaging protocol: XR of the chest 

Views: 1 view. 



COMPARISON: 

[facility] Chest, 1 view 9/1/2020 4:33 PM 



FINDINGS: 

Lungs: Unremarkable. No consolidation. 

Pleural space: Unremarkable. No pleural effusion. No pneumothorax. 

Heart/Mediastinum: The cardiomediastinal silhouette is fairly stable in 

appearance. 

Bones/joints: Degenerative changes again involve the spine. 



IMPRESSION: 

No evidence for acute pulmonary disease. 



Electronically signed by: Jitendra Ferrara On 10/21/2020  18:59:48 PM

## 2020-10-21 NOTE — HPEPDOC
[facility] Medical History & Physical


Date of Admission


Oct 21, 2020


Date of Service:  Oct 21, 2020


Primary Care Physician:  LALA MESA MD


Attending Physician:  CYNDI EM MD





History and Physical


TIME OF SERVICE: 10:05 PM


   


CHIEF COMPLAINT: fever





HISTORY OF PRESENT ILLNESS: 


This 75 yr old F was discharged from rehab yesterday after sustaining a pelvic 

fracture; he was evaluated by her home RN today, who noted that the patient had 

a fever of 101.6. The patient denied feeling well, denied having chills, fever, 

nausea, vomiting, diarrhea, chest pain, dizziness, joint pain, or change in her 

chronic back pain. Her work-up was unremarkable except for a positive UA. 





REVIEW OF SYSTEMS: 12 point review of systems negative except as listed in HPI





PAST MEDICAL/ SURGICAL HISTORY:


Right-sided weakness 2/2 multiple sclerosis uses a walker


Raynaud's


Chronic HTN


Unspecified type of CHF


Chronic CAD/ MI


CKD 3


Anxiety


Hysterectomy


Tubal ligation


Right-sided CEA


History of pelvic fracture





SOCIAL HISTORY:


Former smoker, doesnt drink alcohol, retired & lives w spouse





FAMILY HISTORY:


CAD/MI 


   


ALLERGIES: Please see below.





HOME MEDICATIONS: Please see below. 





PHYSICAL EXAMINATION:


Vital Signs








  Date Time  Temp Pulse Resp B/P (MAP) Pulse Ox O2 Delivery O2 Flow Rate FiO2


 


10/21/20 16:18 99.6 91 26 130/62 (84) 96 Room Air  





GEN: well-nourished / well developed/ NAD


INTEGUMENT: not flushed/ not jaundice 


HEENT: lips acyanotic /mucus membranes moist and pink / sclera anicteric


CVS: RRR/NMRG/ radial pulses intact 


LUNGS:  able to speak full sentences without stopping to take a breath / no 

coughing / lungs are clear to auscultation bilaterally on room air


ABDOMEN:  Contour (flat) / soft & not tender with palpation


MSK/EXTREMITIES: NCAT 


NEURO: speech is not dysarthric 


PSYCH: alert and oriented to person place and time/ able to understand and 

follow all commands





LABORATORY DATA: 


10/21/20 17:21





10/21/20 17:21: 


Immature Granulocyte % (Auto) 1.3, Neutrophils (%) (Auto) 87.7H, Lymphocytes (%)

(Auto) 3.8L, Monocytes (%) (Auto) 6.9H, Eosinophils (%) (Auto) 0.1, Basophils 

(%) (Auto) 0.2, Neutrophils # (Auto) 19.6H, Lymphocytes # (Auto) 0.9L, Monocytes

# (Auto) 1.6H, Eosinophils # (Auto) 0.0, Basophils # (Auto) 0.1, Nucleated Red 

Blood Cells % (auto) 0.0, Anion Gap 7L, Glomerular Filtration Rate 42.3, Lactic 

Acid Level 1.0, Calcium Level 9.5, Total Bilirubin 0.5, Direct Bilirubin 0.1, 

Aspartate Amino Transf (AST/SGOT) 32, Alanine Aminotransferase (ALT/SGPT) 51, 

Alkaline Phosphatase 119H, Total Protein 5.8L, Albumin 2.9L, Albumin/Globulin 

Ratio 1.0L


10/21/20 19:33: 


Urine Color YELLOW, Urine Appearance HAZY, Urine pH 5.0, Urine Specific Gravity 

1.013, Urine Protein 1+H, Urine Glucose (Auto)(UA) NEGATIVE, Urine Ketones 

(Auto) NEGATIVE, Urine Blood NEGATIVE, Urine Nitrite POSITIVE, Urine Bilirubin 

NEGATIVE, Urine Urobilinogen 0.2, Urine Leukocyte Esterase (Auto) TRACEH, Urine 

WBC (Auto) 16H, Urine RBC (Auto) 3, Urine Hyaline Casts (Auto) 1, Urine Bacteria

(Auto) 2+H, Urine Squamous Epithelial Cells 1, Urine Transitional Epithelial 

Cells 1, Urine Mucus (Auto) SMALL, Urine Sperm (Auto) 





IMAGING: Chest X-ray "No evidence for acute pulmonary disease."





MICROBIOLOGY: 10/21/20 Urine Culture, Received


           Pending


10/21/20 Respiratory Virus Panel (PCR) (ISAIAS) - Final, Complete


           


10/21/20 Blood Culture, Received


           Pending


10/21/20 Blood Culture, Received


           Pending





ASSESSMENT: 


 is a 75-year-old with a history of multiple sclerosis, CAD, CKD 3, 

anxiety, Raynaud's, HTN, and unspecified type of CHF, who will be admitted for 

evaluation of SIRS of unclear cause.





PLAN:


1. SIRS cause TBD


SIRS criteria include fever and leucocytosis


Despite the + UA she doesn't have symptoms c/w a UTI


The chest xray and respiratory panel are unrevealing.


She has received one does of Ceftriaxone


Plan: admit to PCU / f/u blood Cx / no additional abx bc she has an asymptomatic

UTI & we don't have a definitive source of infection





2. Right-sided weakness 2/2 multiple sclerosis uses a walker


Plan: PT / baclofen & Avonex





3. Pelvic Fx


Plan: PT / acetaminophen





4. Chronic HTN / Unspecified type of CHF


Plan: nebivolol





5. Chronic CAD/ MI


Plan: nebivolol & ASA





6. CKD 3


at baseline





DVT PROPHYLAXIS: lovenox





DISPOSITION: home after more than 2 midnight's stay





Home Medications


Scheduled


Aspirin (Aspirin EC) 81 Mg Tablet.dr, 81 MG PO QHS


Baclofen (Baclofen) 20 Mg Tablet, 20 MG PO QHS


Calcium Carbonate/Vitamin D3 (Calcium 500-Vit D3 200 Caplet) 1 Each Tablet, 1 

TAB PO BID


Cranberry (Cranberry) 400 Mg Capsule, 400 MG PO QHS


Docusate Sodium (Docusate Sodium) 100 Mg Capsule, 100 MG PO BID


Interferon Beta-1A (Avonex) 30 Mcg/0.5 Ml Syringekit, 30 MCG IM QWEEK


   TUESDAYS 


L.acidoph/L.bulg/B.bif/S.therm (Bacid Caplet) 1 Each Tablet, 1 TAB PO QHS


Multivitamins (Thera M Plus Tablet) 1 Each Tablet, 1 TAB PO QHS


Nebivolol HCl (Bystolic) 5 Mg Tab, 5 MG PO DAILY


Ubidecarenone/Vit E Acet (Co Q-10 100 mg Softgel) 1 Each Capsule, 100 MG PO QHS





Allergies


Coded Allergies:  


     METALS (Verified  Allergy, Intermediate, rash, 9/1/20)





A-FIB/CHADSVASC


A-FIB History


Current/History of A-Fib/PAF?:  No


Current PO Anticoag Therapy:  No











CYNDI EM MD                Oct 21, 2020 20:47

## 2020-10-22 VITALS — DIASTOLIC BLOOD PRESSURE: 66 MMHG | SYSTOLIC BLOOD PRESSURE: 142 MMHG

## 2020-10-22 VITALS — SYSTOLIC BLOOD PRESSURE: 173 MMHG | DIASTOLIC BLOOD PRESSURE: 79 MMHG

## 2020-10-22 VITALS — DIASTOLIC BLOOD PRESSURE: 68 MMHG | SYSTOLIC BLOOD PRESSURE: 118 MMHG

## 2020-10-22 LAB
BUN SERPL-MCNC: 24 MG/DL (ref 7–18)
CALCIUM SERPL-MCNC: 8.6 MG/DL (ref 8.8–10.2)
CHLORIDE SERPL-SCNC: 111 MEQ/L (ref 98–107)
CO2 SERPL-SCNC: 25 MEQ/L (ref 21–32)
CREAT SERPL-MCNC: 1.17 MG/DL (ref 0.55–1.3)
GFR SERPL CREATININE-BSD FRML MDRD: 48.1 ML/MIN/{1.73_M2} (ref 39–?)
GLUCOSE SERPL-MCNC: 87 MG/DL (ref 70–100)
HCT VFR BLD AUTO: 35.3 % (ref 36–47)
HGB BLD-MCNC: 11.3 G/DL (ref 12–15.5)
MAGNESIUM SERPL-MCNC: 1.7 MG/DL (ref 1.8–2.4)
MCH RBC QN AUTO: 32.6 PG (ref 27–33)
MCHC RBC AUTO-ENTMCNC: 32 G/DL (ref 32–36.5)
MCV RBC AUTO: 101.7 FL (ref 80–96)
PLATELET # BLD AUTO: 196 10^3/UL (ref 150–450)
POTASSIUM SERPL-SCNC: 4.1 MEQ/L (ref 3.5–5.1)
RBC # BLD AUTO: 3.47 10^6/UL (ref 4–5.4)
SODIUM SERPL-SCNC: 143 MEQ/L (ref 136–145)
WBC # BLD AUTO: 15.7 10^3/UL (ref 4–10)

## 2020-10-22 RX ADMIN — Medication SCH MG: at 21:00

## 2020-10-22 RX ADMIN — SODIUM CHLORIDE SCH MLS/HR: 9 INJECTION, SOLUTION INTRAVENOUS at 02:42

## 2020-10-22 RX ADMIN — ACETAMINOPHEN PRN MG: 325 TABLET ORAL at 02:51

## 2020-10-22 RX ADMIN — ASPIRIN SCH MG: 81 TABLET ORAL at 21:00

## 2020-10-22 RX ADMIN — DOCUSATE SODIUM SCH MG: 100 CAPSULE, LIQUID FILLED ORAL at 08:47

## 2020-10-22 RX ADMIN — Medication SCH MG: at 08:48

## 2020-10-22 RX ADMIN — DOCUSATE SODIUM SCH MG: 100 CAPSULE, LIQUID FILLED ORAL at 20:59

## 2020-10-22 RX ADMIN — NEBIVOLOL HYDROCHLORIDE SCH MG: 5 TABLET ORAL at 10:23

## 2020-10-22 RX ADMIN — SODIUM CHLORIDE SCH MLS/HR: 9 INJECTION, SOLUTION INTRAVENOUS at 21:01

## 2020-10-22 RX ADMIN — Medication SCH MG: at 20:59

## 2020-10-22 RX ADMIN — ENOXAPARIN SODIUM SCH MG: 30 INJECTION SUBCUTANEOUS at 08:48

## 2020-10-22 RX ADMIN — Medication SCH MG: at 01:25

## 2020-10-22 RX ADMIN — PROBIOTIC PRODUCT - TAB SCH EA: TAB at 21:00

## 2020-10-22 RX ADMIN — SODIUM CHLORIDE SCH MLS/HR: 9 INJECTION, SOLUTION INTRAVENOUS at 12:23

## 2020-10-22 RX ADMIN — BACLOFEN SCH MG: 10 TABLET ORAL at 21:00

## 2020-10-22 NOTE — IPNPDOC
Text Note


Date of Service


The patient was seen on 10/22/20.





NOTE


Subjective: Patient was seen and examined at bedside this morning and later 

again one has been was at bedside.  tells me that he noticed she was 

having chills at home and sweating and the nurse at home found her to be 

febrile. And this is why he called EMT. Patient tells me she was having some 

discomfort on urination although she recalls mentioning no discomfort on u

rination prior and the day but now she remembers but otherwise was feeling well.

Patient tells me that currently she no longer feels any discomfort with 

urination and has not felt feverish nor had chills or shortness of breath. 

Patient also denies chest pain and nausea or vomiting. No overnight events 

reported.








Objective:


Constitutional: Awake and alert, in no apparent distress


ENT: Sclera are clear. Mucosa is moist. 


Respiratory:  Lungs CTA bilaterally.  No respiratory distress. No use of acc

essory muscles.


Cardiovascular: RRR S1 and S2 are normal, no murmur


Gastrointestinal: Abdomen is soft, non distended, non tender, BS present. 


Musculoskeletal: No edema. No joint deformities. RUE 5/5, LUE 5/5, BLE 5/5


Neurologic: No focal neurological deficit.  


Mental Status: A&O x3, normal affect 


Skin: Warm, dry. Dorsal aspect of right foot shows healed surgical incision 

sites where she tells me a mole was removed. Looks clean and dry with no 

discharge and erythema. On tender.








Assessment/plan:


 is a 75-year-old with a history of multiple sclerosis, CAD, CKD 3, 

anxiety, Raynaud's, HTN, and unspecified type of CHF, who will be admitted for 

evaluation of SIRS of unclear cause initially later found to be likely secondary

to urinary tract infection. 





# UTI: Was febrile with leukocytosis and dysuria. Receiving IV ceftriaxone and 

will be sent home on oral antibiotic once urine culture is finalized. Blood 

culture negative to date.


# Right-sided weakness 2/2 multiple sclerosis uses a walker: Was being followed 

by PMNR and was discharged to home. PT eval and continued therapy at home. 

baclofen & Avonex


# Pelvic Fx: PT / acetaminophen


# HTN: Continue home meds. Monitor and titrate.


# CAD: ASA, Statin. 


# CKD 3 at baseline avoid nephrotoxins.


# DVT prophylaxis: Lovenox





A Yousef 


Hospitalist





VS,Fishbone, I+O


Zack CRISOSTOMO I+O


Laboratory Tests


10/21/20 17:21








10/22/20 06:42











Vital Signs








  Date Time  Temp Pulse Resp B/P (MAP) Pulse Ox O2 Delivery O2 Flow Rate FiO2


 


10/22/20 10:23  72  118/68    


 


10/22/20 08:00 97.1  18  97   


 


10/22/20 07:43      Room Air  














I&O- Last 24 Hours up to 6 AM 


 


 10/22/20





 06:00


 


Intake Total 1050 ml


 


Balance 1050 ml

















ROBERTO TOMLINSON MD              Oct 22, 2020 16:27

## 2020-10-23 VITALS — SYSTOLIC BLOOD PRESSURE: 139 MMHG | DIASTOLIC BLOOD PRESSURE: 77 MMHG

## 2020-10-23 LAB
BUN SERPL-MCNC: 18 MG/DL (ref 7–18)
CALCIUM SERPL-MCNC: 9 MG/DL (ref 8.8–10.2)
CHLORIDE SERPL-SCNC: 116 MEQ/L (ref 98–107)
CO2 SERPL-SCNC: 23 MEQ/L (ref 21–32)
CREAT SERPL-MCNC: 1.04 MG/DL (ref 0.55–1.3)
GFR SERPL CREATININE-BSD FRML MDRD: 55.1 ML/MIN/{1.73_M2} (ref 39–?)
GLUCOSE SERPL-MCNC: 90 MG/DL (ref 70–100)
HCT VFR BLD AUTO: 34.1 % (ref 36–47)
HGB BLD-MCNC: 10.7 G/DL (ref 12–15.5)
MCH RBC QN AUTO: 31.5 PG (ref 27–33)
MCHC RBC AUTO-ENTMCNC: 31.4 G/DL (ref 32–36.5)
MCV RBC AUTO: 100.3 FL (ref 80–96)
PLATELET # BLD AUTO: 182 10^3/UL (ref 150–450)
POTASSIUM SERPL-SCNC: 3.9 MEQ/L (ref 3.5–5.1)
RBC # BLD AUTO: 3.4 10^6/UL (ref 4–5.4)
SODIUM SERPL-SCNC: 144 MEQ/L (ref 136–145)
WBC # BLD AUTO: 6.7 10^3/UL (ref 4–10)

## 2020-10-23 RX ADMIN — ENOXAPARIN SODIUM SCH MG: 30 INJECTION SUBCUTANEOUS at 09:59

## 2020-10-23 RX ADMIN — ACETAMINOPHEN PRN MG: 325 TABLET ORAL at 07:08

## 2020-10-23 RX ADMIN — Medication SCH MG: at 09:59

## 2020-10-23 RX ADMIN — DOCUSATE SODIUM SCH MG: 100 CAPSULE, LIQUID FILLED ORAL at 09:59

## 2020-10-23 RX ADMIN — NEBIVOLOL HYDROCHLORIDE SCH MG: 5 TABLET ORAL at 09:59

## 2020-10-23 RX ADMIN — SODIUM CHLORIDE SCH MLS/HR: 9 INJECTION, SOLUTION INTRAVENOUS at 06:28

## 2020-10-23 NOTE — IPNPDOC
Text Note


Date of Service


The patient was seen on 10/23/20.





NOTE


Subjective: Patient was seen and examined this morning at bedside. She is doing 

well today with no complaints of dysuria. She's been walking around her room and

doesn't complain of any chest pain or shortness of breath. There is no overnight

events reported. I explained to her that she had a urinary tract infection that 

she is being treated for and that she can go back home.





Objective:


Constitutional: Awake and alert, in no apparent distress


ENT: Sclera are clear. Mucosa is moist. 


Respiratory:  Lungs CTA bilaterally.  No respiratory distress. No use of 

accessory muscles.


Cardiovascular: RRR S1 and S2 are normal, no murmur


Gastrointestinal: Abdomen is soft, non distended, non tender, BS present. 


Musculoskeletal: No edema. No joint deformities. RUE 5/5, LUE 5/5, BLE 5/5


Neurologic: No focal neurological deficit.  


Mental Status: A&O x3, normal affect 


Skin: Warm, dry. Dorsal aspect of right foot shows healed surgical incision 

sites where she tells me a mole was removed. Looks clean and dry with no 

discharge and erythema. none tender.





Assessment/plan:


 is a 75-year-old with a history of multiple sclerosis, CAD, CKD 3, 

anxiety, Raynaud's, HTN, and unspecified type of CHF, who will be admitted for 

evaluation of SIRS found to be secondary to UTI which grew pansensitive 

Escherichia coli.





# E coli UTI: Was febrile with leukocytosis and dysuria on presentation 

leukocytosis now has resolved and patient is afebrile with no dysuria. Received 

IV ceftriaxone and will be switched to oral antibiotic upon discharge based on 

sensitivities. Blood culture negative.


# Right-sided weakness 2/2 multiple sclerosis uses a walker: Was being followed 

by PMNR and was discharged to home. PT eval done and continued therapy at home. 

baclofen & Avonex


# Pelvic Fx: PT / acetaminophen


# HTN: Continue home meds. Monitor and titrate.


# CAD: ASA, Statin. 


# CKD 3 at baseline avoid nephrotoxins.


# DVT prophylaxis: Lovenox





A Yousef 


Hospitalist





VS,Fishbone, I+O


VS, Fishbone, I+O





Vital Signs








  Date Time  Temp Pulse Resp B/P (MAP) Pulse Ox O2 Delivery O2 Flow Rate FiO2


 


10/23/20 06:14 99.4 76 18 139/77 (65) 97 Room Air  














I&O- Last 24 Hours up to 6 AM 


 


 10/23/20





 06:00


 


Intake Total 2230 ml


 


Output Total 0 ml


 


Balance 2230 ml

















ROBERTO TOMLINSON MD              Oct 23, 2020 07:51

## 2020-10-23 NOTE — DS.PDOC
Discharge Summary


General


Date of Admission


Oct 21, 2020 at 20:41


Date of Discharge


10/23/2020





Discharge Summary


PROCEDURES PERFORMED DURING STAY: [None].





ADMITTING DIAGNOSES: 


1. Fever





DISCHARGE DIAGNOSES:


1. UTI





COMPLICATIONS/CHIEF COMPLAINT: Sepsis, Sirs.





HISTORY OF PRESENT ILLNESS: HPI from admitting H&P


This 75 yr old F was discharged from rehab yesterday after sustaining a pelvic 

fracture; he was evaluated by her home RN today, who noted that the patient had 

a fever of 101.6. The patient denied feeling well, denied having chills, fever, 

nausea, vomiting, diarrhea, chest pain, dizziness, joint pain, or change in her 

chronic back pain. Her work-up was unremarkable except for a positive UA. 








HOSPITAL COURSE:  is a 75-year-old with a history of multiple sclerosis, 

CAD, CKD 3, anxiety, Raynaud's, HTN, and unspecified type of CHF, who will be 

admitted for evaluation of SIRS found to be secondary to UTI which grew 

pansensitive Escherichia coli.





# E coli UTI: Was febrile with leukocytosis and dysuria on presentation 

leukocytosis now has resolved and patient is afebrile with no dysuria. Received 

IV ceftriaxone and will be switched to oral antibiotic upon discharge based on 

sensitivities. Blood culture negative.


# Right-sided weakness 2/2 multiple sclerosis uses a walker: Was being followed 

by PMNR and was discharged to home. PT eval done and continued therapy at home. 

baclofen & Avonex


# Pelvic Fx: PT / acetaminophen


# HTN: Continue home meds. Monitor and titrate.


# CAD: ASA, Statin. 


# CKD 3 at baseline avoid nephrotoxins.





DISCHARGE MEDICATIONS: Please see below.


 


ALLERGIES: Please see below.





PHYSICAL EXAMINATION ON DISCHARGE:


VITAL SIGNS: Please see below.


Constitutional: Awake and alert, in no apparent distress


ENT: Sclera are clear. Mucosa is moist. 


Respiratory:  Lungs CTA bilaterally.  No respiratory distress. No use of 

accessory muscles.


Cardiovascular: RRR S1 and S2 are normal, no murmur


Gastrointestinal: Abdomen is soft, non distended, non tender, BS present. 


Musculoskeletal: No edema. No joint deformities. RUE 5/5, LUE 5/5, BLE 5/5


Neurologic: No focal neurological deficit.  


Mental Status: A&O x3, normal affect 


Skin: Warm, dry. Dorsal aspect of right foot shows healed surgical incision 

sites where she tells me a mole was removed. Looks clean and dry with no 

discharge and erythema. none tender.





LABORATORY DATA: Please see below.





IMAGING:


Exam: XR Chest, 1 View 


Exam date and time: 10/21/2020 6:38 PM 


IMPRESSION: 


No evidence for acute pulmonary disease. 





PROGNOSIS: Fair





ACTIVITY: [As tolerated].





DIET: 2 g sodium diet





DISPOSITION: Home with home care





DISCHARGE INSTRUCTIONS:


Please follow up with your primary care physician within 1 week from discharge. 


If you do not have one, please follow up with us to schedule an appointment.


Please keep all of your follow up appointments. Please call central to book your

appointments with hospital specialists.


Please take all your medications as prescribed. 


Please call/come to Clinic or go to the Emergency Department if - Temp >101, 

intractable Nausea/Vomiting, Diarrhea, Mouth sores, Headaches, Altered mental 

status, Seizures, sudden onset of swelling, bleeding, shortness of breath or 

chest pain.





ITEMS TO FOLLOWUP ON ON OUTPATIENT:


Complete her course of antibiotics


Follow-up with your primary care doctor within 2-5 days of discharge





DISCHARGE CONDITION: [Stable].





TIME SPENT ON DISCHARGE: 25 minutes.





Vital Signs/I&Os





Vital Signs








  Date Time  Temp Pulse Resp B/P (MAP) Pulse Ox O2 Delivery O2 Flow Rate FiO2


 


10/23/20 09:59  76  139/77    


 


10/23/20 06:14 99.4  18  97 Room Air  














I&O- Last 24 Hours up to 6 AM 


 


 10/23/20





 05:59


 


Intake Total 2280 ml


 


Output Total 0 ml


 


Balance 2280 ml











Laboratory Data


Labs 24H


Laboratory Tests 2


10/23/20 09:10: 


Nucleated Red Blood Cells % (auto) 0.0, Anion Gap 5L, Glomerular Filtration Rate

 55.1, Calcium Level 9.0


CBC/BMP


Laboratory Tests


10/23/20 09:10











Microbiology





Microbiology


10/21/20 Urine Culture - Final, Complete


           Escherichia Coli


10/21/20 Respiratory Virus Panel (PCR) (ISAIAS) - Final, Complete


           


10/21/20 Blood Culture - Preliminary, Resulted


           No growth after 24 hours . All specim...


10/21/20 Blood Culture - Preliminary, Resulted


           No growth after 24 hours . All specim...





Discharge Medications


Scheduled


Aspirin (Aspirin EC) 81 Mg Tablet.dr, 81 MG PO QHS, (Reported)


Baclofen (Baclofen) 20 Mg Tablet, 20 MG PO QHS, (Reported)


Calcium Carbonate/Vitamin D3 (Calcium 500-Vit D3 200 Caplet) 1 Each Tablet, 1 

TAB PO BID, (Reported)


Cephalexin (Keflex) 500 Mg Capsule, 500 MG PO BID


Cranberry (Cranberry) 400 Mg Capsule, 400 MG PO QHS, (Reported)


Docusate Sodium (Docusate Sodium) 100 Mg Capsule, 100 MG PO BID, (Reported)


Interferon Beta-1A (Avonex) 30 Mcg/0.5 Ml Syringekit, 30 MCG IM QWEEK, 

(Reported)


   TUESDAYS 


L.acidoph/L.bulg/B.bif/S.therm (Bacid Caplet) 1 Each Tablet, 1 TAB PO QHS, 

(Reported)


Multivitamins (Thera M Plus Tablet) 1 Each Tablet, 1 TAB PO QHS, (Reported)


Nebivolol HCl (Bystolic) 5 Mg Tab, 5 MG PO DAILY, (Reported)


Ubidecarenone/Vit E Acet (Co Q-10 100 mg Softgel) 1 Each Capsule, 100 MG PO QHS,

 (Reported)





Allergies


Coded Allergies:  


     METALS (Verified  Allergy, Intermediate, rash, 9/1/20)











ROBERTO TOMLINSON MD              Oct 23, 2020 13:25

## 2020-10-27 ENCOUNTER — HOSPITAL ENCOUNTER (OUTPATIENT)
Dept: HOSPITAL 53 - M INFU | Age: 75
Discharge: HOME | End: 2020-10-27
Attending: PHYSICIAN ASSISTANT
Payer: MEDICARE

## 2020-10-27 VITALS — WEIGHT: 105.82 LBS | HEIGHT: 59 IN | BODY MASS INDEX: 21.33 KG/M2

## 2020-10-27 VITALS — SYSTOLIC BLOOD PRESSURE: 130 MMHG | DIASTOLIC BLOOD PRESSURE: 80 MMHG

## 2020-10-27 DIAGNOSIS — G35: Primary | ICD-10-CM

## 2020-10-27 LAB
ALBUMIN SERPL BCG-MCNC: 3.5 GM/DL (ref 3.2–5.2)
ALT SERPL W P-5'-P-CCNC: 29 U/L (ref 12–78)
BILIRUB SERPL-MCNC: 0.5 MG/DL (ref 0.2–1)
BUN SERPL-MCNC: 33 MG/DL (ref 7–18)
CALCIUM SERPL-MCNC: 9.4 MG/DL (ref 8.8–10.2)
CHLORIDE SERPL-SCNC: 108 MEQ/L (ref 98–107)
CO2 SERPL-SCNC: 31 MEQ/L (ref 21–32)
CREAT SERPL-MCNC: 1.48 MG/DL (ref 0.55–1.3)
GFR SERPL CREATININE-BSD FRML MDRD: 36.7 ML/MIN/{1.73_M2} (ref 39–?)
GLUCOSE SERPL-MCNC: 126 MG/DL (ref 70–100)
POTASSIUM SERPL-SCNC: 4.8 MEQ/L (ref 3.5–5.1)
PROT SERPL-MCNC: 6.9 GM/DL (ref 6.4–8.2)
SODIUM SERPL-SCNC: 142 MEQ/L (ref 136–145)

## 2020-10-27 PROCEDURE — 96372 THER/PROPH/DIAG INJ SC/IM: CPT

## 2020-11-03 ENCOUNTER — HOSPITAL ENCOUNTER (OUTPATIENT)
Dept: HOSPITAL 53 - M INFU | Age: 75
Discharge: HOME | End: 2020-11-03
Attending: PHYSICIAN ASSISTANT
Payer: MEDICARE

## 2020-11-03 VITALS — BODY MASS INDEX: 21.33 KG/M2 | HEIGHT: 59 IN | WEIGHT: 105.82 LBS

## 2020-11-03 VITALS — SYSTOLIC BLOOD PRESSURE: 142 MMHG | DIASTOLIC BLOOD PRESSURE: 86 MMHG

## 2020-11-03 DIAGNOSIS — G35: Primary | ICD-10-CM

## 2020-11-03 PROCEDURE — 96401 CHEMO ANTI-NEOPL SQ/IM: CPT

## 2020-11-10 ENCOUNTER — HOSPITAL ENCOUNTER (OUTPATIENT)
Dept: HOSPITAL 53 - M INFU | Age: 75
Discharge: HOME | End: 2020-11-10
Attending: PHYSICIAN ASSISTANT
Payer: MEDICARE

## 2020-11-10 VITALS — SYSTOLIC BLOOD PRESSURE: 140 MMHG | DIASTOLIC BLOOD PRESSURE: 70 MMHG

## 2020-11-10 VITALS — BODY MASS INDEX: 21.33 KG/M2 | HEIGHT: 59 IN | WEIGHT: 105.82 LBS

## 2020-11-10 DIAGNOSIS — G35: Primary | ICD-10-CM

## 2020-11-10 PROCEDURE — 96401 CHEMO ANTI-NEOPL SQ/IM: CPT

## 2020-11-17 ENCOUNTER — HOSPITAL ENCOUNTER (OUTPATIENT)
Dept: HOSPITAL 53 - M INFU | Age: 75
Discharge: HOME | End: 2020-11-17
Attending: PHYSICIAN ASSISTANT
Payer: MEDICARE

## 2020-11-17 VITALS — WEIGHT: 105.82 LBS | BODY MASS INDEX: 21.33 KG/M2 | HEIGHT: 59 IN

## 2020-11-17 VITALS — SYSTOLIC BLOOD PRESSURE: 152 MMHG | DIASTOLIC BLOOD PRESSURE: 74 MMHG

## 2020-11-17 DIAGNOSIS — G35: Primary | ICD-10-CM

## 2020-11-17 PROCEDURE — 96401 CHEMO ANTI-NEOPL SQ/IM: CPT

## 2020-11-24 ENCOUNTER — HOSPITAL ENCOUNTER (OUTPATIENT)
Dept: HOSPITAL 53 - M INFU | Age: 75
Discharge: HOME | End: 2020-11-24
Attending: PHYSICIAN ASSISTANT
Payer: MEDICARE

## 2020-11-24 VITALS — DIASTOLIC BLOOD PRESSURE: 71 MMHG | SYSTOLIC BLOOD PRESSURE: 144 MMHG

## 2020-11-24 VITALS — HEIGHT: 58 IN | BODY MASS INDEX: 22.21 KG/M2 | WEIGHT: 105.82 LBS

## 2020-11-24 DIAGNOSIS — G35: Primary | ICD-10-CM

## 2020-11-24 PROCEDURE — 96401 CHEMO ANTI-NEOPL SQ/IM: CPT

## 2020-12-01 ENCOUNTER — HOSPITAL ENCOUNTER (OUTPATIENT)
Dept: HOSPITAL 53 - M INFU | Age: 75
Discharge: HOME | End: 2020-12-01
Attending: PHYSICIAN ASSISTANT
Payer: MEDICARE

## 2020-12-01 VITALS — HEIGHT: 59 IN | WEIGHT: 105.82 LBS | BODY MASS INDEX: 21.33 KG/M2

## 2020-12-01 VITALS — DIASTOLIC BLOOD PRESSURE: 87 MMHG | SYSTOLIC BLOOD PRESSURE: 137 MMHG

## 2020-12-01 DIAGNOSIS — G35: Primary | ICD-10-CM

## 2020-12-01 PROCEDURE — 96401 CHEMO ANTI-NEOPL SQ/IM: CPT

## 2020-12-02 ENCOUNTER — HOSPITAL ENCOUNTER (OUTPATIENT)
Dept: HOSPITAL 53 - M LAB REF | Age: 75
End: 2020-12-02
Attending: NURSE PRACTITIONER
Payer: MEDICARE

## 2020-12-02 DIAGNOSIS — N39.0: Primary | ICD-10-CM

## 2020-12-08 ENCOUNTER — HOSPITAL ENCOUNTER (OUTPATIENT)
Dept: HOSPITAL 53 - M INFU | Age: 75
Discharge: HOME | End: 2020-12-08
Attending: PHYSICIAN ASSISTANT
Payer: MEDICARE

## 2020-12-08 VITALS — HEIGHT: 59 IN | BODY MASS INDEX: 21.33 KG/M2 | WEIGHT: 105.82 LBS

## 2020-12-08 VITALS — DIASTOLIC BLOOD PRESSURE: 81 MMHG | SYSTOLIC BLOOD PRESSURE: 183 MMHG

## 2020-12-08 DIAGNOSIS — G35: Primary | ICD-10-CM

## 2020-12-08 PROCEDURE — 96401 CHEMO ANTI-NEOPL SQ/IM: CPT

## 2020-12-22 ENCOUNTER — HOSPITAL ENCOUNTER (OUTPATIENT)
Dept: HOSPITAL 53 - M INFU | Age: 75
Discharge: HOME | End: 2020-12-22
Attending: PHYSICIAN ASSISTANT
Payer: MEDICARE

## 2020-12-22 VITALS — SYSTOLIC BLOOD PRESSURE: 136 MMHG | DIASTOLIC BLOOD PRESSURE: 79 MMHG

## 2020-12-22 VITALS — WEIGHT: 108.03 LBS | HEIGHT: 58 IN | BODY MASS INDEX: 22.68 KG/M2

## 2020-12-22 DIAGNOSIS — G35: Primary | ICD-10-CM

## 2020-12-22 PROCEDURE — 96401 CHEMO ANTI-NEOPL SQ/IM: CPT

## 2020-12-29 ENCOUNTER — HOSPITAL ENCOUNTER (OUTPATIENT)
Dept: HOSPITAL 53 - M INFU | Age: 75
Discharge: HOME | End: 2020-12-29
Attending: PHYSICIAN ASSISTANT
Payer: MEDICARE

## 2020-12-29 VITALS — BODY MASS INDEX: 22.68 KG/M2 | HEIGHT: 58 IN | WEIGHT: 108.03 LBS

## 2020-12-29 VITALS — DIASTOLIC BLOOD PRESSURE: 63 MMHG | SYSTOLIC BLOOD PRESSURE: 130 MMHG

## 2020-12-29 DIAGNOSIS — G35: Primary | ICD-10-CM

## 2020-12-29 PROCEDURE — 96401 CHEMO ANTI-NEOPL SQ/IM: CPT

## 2021-01-05 ENCOUNTER — HOSPITAL ENCOUNTER (OUTPATIENT)
Dept: HOSPITAL 53 - M INFU | Age: 76
Discharge: HOME | End: 2021-01-05
Attending: PHYSICIAN ASSISTANT
Payer: MEDICARE

## 2021-01-05 VITALS — DIASTOLIC BLOOD PRESSURE: 71 MMHG | SYSTOLIC BLOOD PRESSURE: 168 MMHG

## 2021-01-05 VITALS — HEIGHT: 58 IN | WEIGHT: 108.03 LBS | BODY MASS INDEX: 22.68 KG/M2

## 2021-01-05 DIAGNOSIS — G35: Primary | ICD-10-CM

## 2021-01-05 PROCEDURE — 96402 CHEMO HORMON ANTINEOPL SQ/IM: CPT

## 2021-01-12 ENCOUNTER — HOSPITAL ENCOUNTER (OUTPATIENT)
Dept: HOSPITAL 53 - M INFU | Age: 76
Discharge: HOME | End: 2021-01-12
Attending: PHYSICIAN ASSISTANT
Payer: MEDICARE

## 2021-01-12 VITALS — BODY MASS INDEX: 22.68 KG/M2 | HEIGHT: 58 IN | WEIGHT: 108.03 LBS

## 2021-01-12 VITALS — SYSTOLIC BLOOD PRESSURE: 141 MMHG | DIASTOLIC BLOOD PRESSURE: 76 MMHG

## 2021-01-12 DIAGNOSIS — G35: Primary | ICD-10-CM

## 2021-01-12 PROCEDURE — 96401 CHEMO ANTI-NEOPL SQ/IM: CPT

## 2021-01-19 ENCOUNTER — HOSPITAL ENCOUNTER (OUTPATIENT)
Dept: HOSPITAL 53 - M INFU | Age: 76
Discharge: HOME | End: 2021-01-19
Attending: PHYSICIAN ASSISTANT
Payer: MEDICARE

## 2021-01-19 VITALS — BODY MASS INDEX: 21.78 KG/M2 | WEIGHT: 108.03 LBS | HEIGHT: 59 IN

## 2021-01-19 VITALS — DIASTOLIC BLOOD PRESSURE: 76 MMHG | SYSTOLIC BLOOD PRESSURE: 165 MMHG

## 2021-01-19 DIAGNOSIS — G35: Primary | ICD-10-CM

## 2021-01-19 PROCEDURE — 96401 CHEMO ANTI-NEOPL SQ/IM: CPT

## 2021-01-26 ENCOUNTER — HOSPITAL ENCOUNTER (OUTPATIENT)
Dept: HOSPITAL 53 - M INFU | Age: 76
Discharge: HOME | End: 2021-01-26
Attending: PHYSICIAN ASSISTANT
Payer: MEDICARE

## 2021-01-26 VITALS — WEIGHT: 108.03 LBS | BODY MASS INDEX: 22.68 KG/M2 | HEIGHT: 58 IN

## 2021-01-26 VITALS — DIASTOLIC BLOOD PRESSURE: 67 MMHG | SYSTOLIC BLOOD PRESSURE: 153 MMHG

## 2021-01-26 DIAGNOSIS — G35: Primary | ICD-10-CM

## 2021-01-26 PROCEDURE — 96401 CHEMO ANTI-NEOPL SQ/IM: CPT

## 2021-02-02 ENCOUNTER — HOSPITAL ENCOUNTER (OUTPATIENT)
Dept: HOSPITAL 53 - M INFU | Age: 76
Discharge: HOME | End: 2021-02-02
Attending: PHYSICIAN ASSISTANT
Payer: MEDICARE

## 2021-02-02 VITALS — SYSTOLIC BLOOD PRESSURE: 138 MMHG | DIASTOLIC BLOOD PRESSURE: 71 MMHG

## 2021-02-02 VITALS — BODY MASS INDEX: 22.68 KG/M2 | WEIGHT: 108.03 LBS | HEIGHT: 58 IN

## 2021-02-02 DIAGNOSIS — G35: Primary | ICD-10-CM

## 2021-02-02 PROCEDURE — 96401 CHEMO ANTI-NEOPL SQ/IM: CPT

## 2021-02-09 ENCOUNTER — HOSPITAL ENCOUNTER (OUTPATIENT)
Dept: HOSPITAL 53 - M INFU | Age: 76
Discharge: HOME | End: 2021-02-09
Attending: PHYSICIAN ASSISTANT
Payer: MEDICARE

## 2021-02-09 VITALS — BODY MASS INDEX: 16.37 KG/M2 | WEIGHT: 108.03 LBS | HEIGHT: 68 IN

## 2021-02-09 VITALS — DIASTOLIC BLOOD PRESSURE: 69 MMHG | SYSTOLIC BLOOD PRESSURE: 146 MMHG

## 2021-02-09 DIAGNOSIS — G35: Primary | ICD-10-CM

## 2021-02-09 PROCEDURE — 96401 CHEMO ANTI-NEOPL SQ/IM: CPT

## 2021-02-16 ENCOUNTER — HOSPITAL ENCOUNTER (OUTPATIENT)
Dept: HOSPITAL 53 - M INFU | Age: 76
Discharge: HOME | End: 2021-02-16
Attending: PHYSICIAN ASSISTANT
Payer: MEDICARE

## 2021-02-16 VITALS — WEIGHT: 108.03 LBS | HEIGHT: 58 IN | BODY MASS INDEX: 22.68 KG/M2

## 2021-02-16 VITALS — SYSTOLIC BLOOD PRESSURE: 167 MMHG | DIASTOLIC BLOOD PRESSURE: 77 MMHG

## 2021-02-16 DIAGNOSIS — G35: Primary | ICD-10-CM

## 2021-02-16 PROCEDURE — 96401 CHEMO ANTI-NEOPL SQ/IM: CPT

## 2021-02-23 ENCOUNTER — HOSPITAL ENCOUNTER (OUTPATIENT)
Dept: HOSPITAL 53 - M INFU | Age: 76
Discharge: HOME | End: 2021-02-23
Attending: PHYSICIAN ASSISTANT
Payer: MEDICARE

## 2021-02-23 VITALS — BODY MASS INDEX: 22.68 KG/M2 | HEIGHT: 58 IN | WEIGHT: 108.03 LBS

## 2021-02-23 VITALS — SYSTOLIC BLOOD PRESSURE: 140 MMHG | DIASTOLIC BLOOD PRESSURE: 63 MMHG

## 2021-02-23 DIAGNOSIS — G35: Primary | ICD-10-CM

## 2021-02-23 PROCEDURE — 96401 CHEMO ANTI-NEOPL SQ/IM: CPT

## 2021-03-02 ENCOUNTER — HOSPITAL ENCOUNTER (OUTPATIENT)
Dept: HOSPITAL 53 - M INFU | Age: 76
Discharge: HOME | End: 2021-03-02
Attending: PHYSICIAN ASSISTANT
Payer: MEDICARE

## 2021-03-02 VITALS — WEIGHT: 108.03 LBS | HEIGHT: 58 IN | BODY MASS INDEX: 22.68 KG/M2

## 2021-03-02 VITALS — SYSTOLIC BLOOD PRESSURE: 140 MMHG | DIASTOLIC BLOOD PRESSURE: 81 MMHG

## 2021-03-02 DIAGNOSIS — G35: Primary | ICD-10-CM

## 2021-03-02 PROCEDURE — 96401 CHEMO ANTI-NEOPL SQ/IM: CPT

## 2021-03-09 ENCOUNTER — HOSPITAL ENCOUNTER (OUTPATIENT)
Dept: HOSPITAL 53 - M INFU | Age: 76
Discharge: HOME | End: 2021-03-09
Attending: PHYSICIAN ASSISTANT
Payer: MEDICARE

## 2021-03-09 VITALS — SYSTOLIC BLOOD PRESSURE: 141 MMHG | DIASTOLIC BLOOD PRESSURE: 63 MMHG

## 2021-03-09 VITALS — WEIGHT: 108.03 LBS | HEIGHT: 58 IN | BODY MASS INDEX: 22.68 KG/M2

## 2021-03-09 DIAGNOSIS — G35: Primary | ICD-10-CM

## 2021-03-09 PROCEDURE — 96401 CHEMO ANTI-NEOPL SQ/IM: CPT

## 2021-03-16 ENCOUNTER — HOSPITAL ENCOUNTER (OUTPATIENT)
Dept: HOSPITAL 53 - M INFU | Age: 76
Discharge: HOME | End: 2021-03-16
Attending: PHYSICIAN ASSISTANT
Payer: MEDICARE

## 2021-03-16 VITALS — WEIGHT: 108.03 LBS | BODY MASS INDEX: 22.68 KG/M2 | HEIGHT: 58 IN

## 2021-03-16 VITALS — DIASTOLIC BLOOD PRESSURE: 73 MMHG | SYSTOLIC BLOOD PRESSURE: 165 MMHG

## 2021-03-16 DIAGNOSIS — G35: Primary | ICD-10-CM

## 2021-03-16 PROCEDURE — 96401 CHEMO ANTI-NEOPL SQ/IM: CPT

## 2021-03-23 ENCOUNTER — HOSPITAL ENCOUNTER (OUTPATIENT)
Dept: HOSPITAL 53 - M INFU | Age: 76
Discharge: HOME | End: 2021-03-23
Attending: PHYSICIAN ASSISTANT
Payer: MEDICARE

## 2021-03-23 VITALS — HEIGHT: 58 IN | WEIGHT: 108.03 LBS | BODY MASS INDEX: 22.68 KG/M2

## 2021-03-23 VITALS — SYSTOLIC BLOOD PRESSURE: 170 MMHG | DIASTOLIC BLOOD PRESSURE: 78 MMHG

## 2021-03-23 DIAGNOSIS — G35: Primary | ICD-10-CM

## 2021-03-23 PROCEDURE — 96401 CHEMO ANTI-NEOPL SQ/IM: CPT

## 2021-03-30 ENCOUNTER — HOSPITAL ENCOUNTER (OUTPATIENT)
Dept: HOSPITAL 53 - M INFU | Age: 76
Discharge: HOME | End: 2021-03-30
Attending: PHYSICIAN ASSISTANT
Payer: MEDICARE

## 2021-03-30 VITALS — BODY MASS INDEX: 22.68 KG/M2 | HEIGHT: 58 IN | WEIGHT: 108.03 LBS

## 2021-03-30 VITALS — DIASTOLIC BLOOD PRESSURE: 81 MMHG | SYSTOLIC BLOOD PRESSURE: 140 MMHG

## 2021-03-30 DIAGNOSIS — G35: Primary | ICD-10-CM

## 2021-03-30 PROCEDURE — 96401 CHEMO ANTI-NEOPL SQ/IM: CPT

## 2021-04-06 ENCOUNTER — HOSPITAL ENCOUNTER (OUTPATIENT)
Dept: HOSPITAL 53 - M INFU | Age: 76
Discharge: HOME | End: 2021-04-06
Attending: PHYSICIAN ASSISTANT
Payer: MEDICARE

## 2021-04-06 VITALS — DIASTOLIC BLOOD PRESSURE: 78 MMHG | SYSTOLIC BLOOD PRESSURE: 170 MMHG

## 2021-04-06 VITALS — HEIGHT: 58 IN | WEIGHT: 108.03 LBS | BODY MASS INDEX: 22.68 KG/M2

## 2021-04-06 DIAGNOSIS — G35: Primary | ICD-10-CM

## 2021-04-06 PROCEDURE — 96401 CHEMO ANTI-NEOPL SQ/IM: CPT

## 2021-04-13 ENCOUNTER — HOSPITAL ENCOUNTER (OUTPATIENT)
Dept: HOSPITAL 53 - M INFU | Age: 76
Discharge: HOME | End: 2021-04-13
Attending: PHYSICIAN ASSISTANT
Payer: MEDICARE

## 2021-04-13 VITALS — DIASTOLIC BLOOD PRESSURE: 63 MMHG | SYSTOLIC BLOOD PRESSURE: 146 MMHG

## 2021-04-13 VITALS — WEIGHT: 108.03 LBS | BODY MASS INDEX: 22.68 KG/M2 | HEIGHT: 58 IN

## 2021-04-13 DIAGNOSIS — G35: Primary | ICD-10-CM

## 2021-04-13 PROCEDURE — 96401 CHEMO ANTI-NEOPL SQ/IM: CPT

## 2021-04-23 ENCOUNTER — HOSPITAL ENCOUNTER (OUTPATIENT)
Dept: HOSPITAL 53 - M LAB REF | Age: 76
End: 2021-04-23
Attending: NURSE PRACTITIONER
Payer: MEDICARE

## 2021-04-23 DIAGNOSIS — N39.0: Primary | ICD-10-CM

## 2021-04-27 ENCOUNTER — HOSPITAL ENCOUNTER (OUTPATIENT)
Dept: HOSPITAL 53 - M INFU | Age: 76
Discharge: HOME | End: 2021-04-27
Attending: PHYSICIAN ASSISTANT
Payer: MEDICARE

## 2021-04-27 VITALS — WEIGHT: 108.03 LBS | HEIGHT: 59 IN | BODY MASS INDEX: 21.78 KG/M2

## 2021-04-27 VITALS — SYSTOLIC BLOOD PRESSURE: 139 MMHG | DIASTOLIC BLOOD PRESSURE: 60 MMHG

## 2021-04-27 DIAGNOSIS — G35: Primary | ICD-10-CM

## 2021-04-27 PROCEDURE — 96401 CHEMO ANTI-NEOPL SQ/IM: CPT

## 2021-05-04 ENCOUNTER — HOSPITAL ENCOUNTER (OUTPATIENT)
Dept: HOSPITAL 53 - M INFU | Age: 76
Discharge: HOME | End: 2021-05-04
Attending: PHYSICIAN ASSISTANT
Payer: MEDICARE

## 2021-05-04 VITALS — DIASTOLIC BLOOD PRESSURE: 68 MMHG | SYSTOLIC BLOOD PRESSURE: 138 MMHG

## 2021-05-04 VITALS — HEIGHT: 70 IN | WEIGHT: 108.03 LBS | BODY MASS INDEX: 15.47 KG/M2

## 2021-05-04 DIAGNOSIS — G35: Primary | ICD-10-CM

## 2021-05-04 PROCEDURE — 96401 CHEMO ANTI-NEOPL SQ/IM: CPT

## 2021-05-11 ENCOUNTER — HOSPITAL ENCOUNTER (OUTPATIENT)
Dept: HOSPITAL 53 - M INFU | Age: 76
Discharge: HOME | End: 2021-05-11
Attending: PHYSICIAN ASSISTANT
Payer: MEDICARE

## 2021-05-11 VITALS — BODY MASS INDEX: 22.68 KG/M2 | HEIGHT: 58 IN | WEIGHT: 108.03 LBS

## 2021-05-11 VITALS — SYSTOLIC BLOOD PRESSURE: 167 MMHG | DIASTOLIC BLOOD PRESSURE: 77 MMHG

## 2021-05-11 DIAGNOSIS — G35: Primary | ICD-10-CM

## 2021-05-11 PROCEDURE — 96401 CHEMO ANTI-NEOPL SQ/IM: CPT

## 2021-05-18 ENCOUNTER — HOSPITAL ENCOUNTER (OUTPATIENT)
Dept: HOSPITAL 53 - M INFU | Age: 76
Discharge: HOME | End: 2021-05-18
Attending: PHYSICIAN ASSISTANT
Payer: MEDICARE

## 2021-05-18 VITALS — BODY MASS INDEX: 22.68 KG/M2 | HEIGHT: 58 IN | WEIGHT: 108.03 LBS

## 2021-05-18 VITALS — SYSTOLIC BLOOD PRESSURE: 152 MMHG | DIASTOLIC BLOOD PRESSURE: 72 MMHG

## 2021-05-18 DIAGNOSIS — G35: Primary | ICD-10-CM

## 2021-05-18 PROCEDURE — 96401 CHEMO ANTI-NEOPL SQ/IM: CPT

## 2021-05-25 ENCOUNTER — HOSPITAL ENCOUNTER (OUTPATIENT)
Dept: HOSPITAL 53 - M INFU | Age: 76
Discharge: HOME | End: 2021-05-25
Attending: PHYSICIAN ASSISTANT
Payer: MEDICARE

## 2021-05-25 VITALS — WEIGHT: 108.03 LBS | HEIGHT: 58 IN | BODY MASS INDEX: 22.68 KG/M2

## 2021-05-25 VITALS — DIASTOLIC BLOOD PRESSURE: 81 MMHG | SYSTOLIC BLOOD PRESSURE: 124 MMHG

## 2021-05-25 DIAGNOSIS — G35: Primary | ICD-10-CM

## 2021-05-25 PROCEDURE — 96401 CHEMO ANTI-NEOPL SQ/IM: CPT

## 2021-06-01 ENCOUNTER — HOSPITAL ENCOUNTER (OUTPATIENT)
Dept: HOSPITAL 53 - M INFU | Age: 76
Discharge: HOME | End: 2021-06-01
Attending: PHYSICIAN ASSISTANT
Payer: MEDICARE

## 2021-06-01 VITALS — DIASTOLIC BLOOD PRESSURE: 70 MMHG | SYSTOLIC BLOOD PRESSURE: 158 MMHG

## 2021-06-01 VITALS — BODY MASS INDEX: 16.37 KG/M2 | WEIGHT: 108.03 LBS | HEIGHT: 68 IN

## 2021-06-01 DIAGNOSIS — G35: Primary | ICD-10-CM

## 2021-06-01 PROCEDURE — 96401 CHEMO ANTI-NEOPL SQ/IM: CPT

## 2021-06-08 ENCOUNTER — HOSPITAL ENCOUNTER (OUTPATIENT)
Dept: HOSPITAL 53 - M INFU | Age: 76
Discharge: HOME | End: 2021-06-08
Attending: PHYSICIAN ASSISTANT
Payer: MEDICARE

## 2021-06-08 VITALS — HEIGHT: 58 IN | WEIGHT: 108.03 LBS | BODY MASS INDEX: 22.68 KG/M2

## 2021-06-08 VITALS — SYSTOLIC BLOOD PRESSURE: 154 MMHG | DIASTOLIC BLOOD PRESSURE: 68 MMHG

## 2021-06-08 DIAGNOSIS — G35: Primary | ICD-10-CM

## 2021-06-08 PROCEDURE — 96401 CHEMO ANTI-NEOPL SQ/IM: CPT

## 2021-06-15 ENCOUNTER — HOSPITAL ENCOUNTER (OUTPATIENT)
Dept: HOSPITAL 53 - M INFU | Age: 76
Discharge: HOME | End: 2021-06-15
Attending: PHYSICIAN ASSISTANT
Payer: MEDICARE

## 2021-06-15 VITALS — DIASTOLIC BLOOD PRESSURE: 78 MMHG | SYSTOLIC BLOOD PRESSURE: 151 MMHG

## 2021-06-15 VITALS — WEIGHT: 108.03 LBS | HEIGHT: 58 IN | BODY MASS INDEX: 22.68 KG/M2

## 2021-06-15 DIAGNOSIS — G35: Primary | ICD-10-CM

## 2021-06-15 PROCEDURE — 96401 CHEMO ANTI-NEOPL SQ/IM: CPT

## 2021-06-22 ENCOUNTER — HOSPITAL ENCOUNTER (OUTPATIENT)
Dept: HOSPITAL 53 - M INFU | Age: 76
Discharge: HOME | End: 2021-06-22
Attending: PHYSICIAN ASSISTANT
Payer: MEDICARE

## 2021-06-22 VITALS — HEIGHT: 58 IN | WEIGHT: 108.03 LBS | BODY MASS INDEX: 22.68 KG/M2

## 2021-06-22 VITALS — SYSTOLIC BLOOD PRESSURE: 142 MMHG | DIASTOLIC BLOOD PRESSURE: 67 MMHG

## 2021-06-22 DIAGNOSIS — G35: Primary | ICD-10-CM

## 2021-06-22 PROCEDURE — 96401 CHEMO ANTI-NEOPL SQ/IM: CPT

## 2021-06-29 ENCOUNTER — HOSPITAL ENCOUNTER (OUTPATIENT)
Dept: HOSPITAL 53 - M INFU | Age: 76
Discharge: HOME | End: 2021-06-29
Attending: PHYSICIAN ASSISTANT
Payer: MEDICARE

## 2021-06-29 VITALS — BODY MASS INDEX: 22.68 KG/M2 | HEIGHT: 58 IN | WEIGHT: 108.03 LBS

## 2021-06-29 VITALS — SYSTOLIC BLOOD PRESSURE: 143 MMHG | DIASTOLIC BLOOD PRESSURE: 67 MMHG

## 2021-06-29 DIAGNOSIS — G35: Primary | ICD-10-CM

## 2021-06-29 PROCEDURE — 96401 CHEMO ANTI-NEOPL SQ/IM: CPT

## 2021-06-29 RX ADMIN — INTERFERON BETA-1A ONE MCG: 30 INJECTION, POWDER, LYOPHILIZED, FOR SOLUTION INTRAMUSCULAR at 15:28

## 2021-06-29 RX ADMIN — INTERFERON BETA-1A ONE MCG: 30 INJECTION, POWDER, LYOPHILIZED, FOR SOLUTION INTRAMUSCULAR at 14:30

## 2021-07-06 ENCOUNTER — HOSPITAL ENCOUNTER (OUTPATIENT)
Dept: HOSPITAL 53 - M INFU | Age: 76
Discharge: HOME | End: 2021-07-06
Attending: PHYSICIAN ASSISTANT
Payer: MEDICARE

## 2021-07-06 VITALS — BODY MASS INDEX: 22.68 KG/M2 | WEIGHT: 108.03 LBS | HEIGHT: 58 IN

## 2021-07-06 VITALS — SYSTOLIC BLOOD PRESSURE: 165 MMHG | DIASTOLIC BLOOD PRESSURE: 72 MMHG

## 2021-07-06 DIAGNOSIS — G35: Primary | ICD-10-CM

## 2021-07-06 PROCEDURE — 96401 CHEMO ANTI-NEOPL SQ/IM: CPT

## 2021-07-13 ENCOUNTER — HOSPITAL ENCOUNTER (OUTPATIENT)
Dept: HOSPITAL 53 - M INFU | Age: 76
Discharge: HOME | End: 2021-07-13
Attending: PHYSICIAN ASSISTANT
Payer: MEDICARE

## 2021-07-13 VITALS — DIASTOLIC BLOOD PRESSURE: 83 MMHG | SYSTOLIC BLOOD PRESSURE: 131 MMHG

## 2021-07-13 VITALS — WEIGHT: 108.03 LBS | BODY MASS INDEX: 16.37 KG/M2 | HEIGHT: 68 IN

## 2021-07-13 DIAGNOSIS — G35: Primary | ICD-10-CM

## 2021-07-13 PROCEDURE — 96401 CHEMO ANTI-NEOPL SQ/IM: CPT

## 2021-07-27 ENCOUNTER — HOSPITAL ENCOUNTER (OUTPATIENT)
Dept: HOSPITAL 53 - M INFU | Age: 76
Discharge: HOME | End: 2021-07-27
Attending: PHYSICIAN ASSISTANT
Payer: MEDICARE

## 2021-07-27 VITALS — BODY MASS INDEX: 22.68 KG/M2 | HEIGHT: 58 IN | WEIGHT: 108.03 LBS

## 2021-07-27 VITALS — DIASTOLIC BLOOD PRESSURE: 72 MMHG | SYSTOLIC BLOOD PRESSURE: 131 MMHG

## 2021-07-27 DIAGNOSIS — G35: Primary | ICD-10-CM

## 2021-07-27 PROCEDURE — 96401 CHEMO ANTI-NEOPL SQ/IM: CPT

## 2021-08-03 ENCOUNTER — HOSPITAL ENCOUNTER (OUTPATIENT)
Dept: HOSPITAL 53 - M INFU | Age: 76
Discharge: HOME | End: 2021-08-03
Attending: PHYSICIAN ASSISTANT
Payer: MEDICARE

## 2021-08-03 VITALS — BODY MASS INDEX: 22.68 KG/M2 | HEIGHT: 58 IN | WEIGHT: 108.03 LBS

## 2021-08-03 VITALS — SYSTOLIC BLOOD PRESSURE: 154 MMHG | DIASTOLIC BLOOD PRESSURE: 69 MMHG

## 2021-08-03 DIAGNOSIS — G35: Primary | ICD-10-CM

## 2021-08-03 PROCEDURE — 96401 CHEMO ANTI-NEOPL SQ/IM: CPT

## 2021-08-10 ENCOUNTER — HOSPITAL ENCOUNTER (OUTPATIENT)
Dept: HOSPITAL 53 - M INFU | Age: 76
Discharge: HOME | End: 2021-08-10
Attending: PHYSICIAN ASSISTANT
Payer: MEDICARE

## 2021-08-10 VITALS — BODY MASS INDEX: 22.68 KG/M2 | HEIGHT: 58 IN | WEIGHT: 108.03 LBS

## 2021-08-10 VITALS — DIASTOLIC BLOOD PRESSURE: 73 MMHG | SYSTOLIC BLOOD PRESSURE: 149 MMHG

## 2021-08-10 DIAGNOSIS — G35: Primary | ICD-10-CM

## 2021-08-10 PROCEDURE — 96401 CHEMO ANTI-NEOPL SQ/IM: CPT

## 2021-08-17 ENCOUNTER — HOSPITAL ENCOUNTER (OUTPATIENT)
Dept: HOSPITAL 53 - M INFU | Age: 76
Discharge: HOME | End: 2021-08-17
Attending: PHYSICIAN ASSISTANT
Payer: MEDICARE

## 2021-08-17 VITALS — SYSTOLIC BLOOD PRESSURE: 150 MMHG | DIASTOLIC BLOOD PRESSURE: 77 MMHG

## 2021-08-17 DIAGNOSIS — G35: Primary | ICD-10-CM

## 2021-08-17 PROCEDURE — 96401 CHEMO ANTI-NEOPL SQ/IM: CPT

## 2021-08-24 ENCOUNTER — HOSPITAL ENCOUNTER (OUTPATIENT)
Dept: HOSPITAL 53 - M INFU | Age: 76
Discharge: HOME | End: 2021-08-24
Attending: PHYSICIAN ASSISTANT
Payer: MEDICARE

## 2021-08-24 VITALS — SYSTOLIC BLOOD PRESSURE: 149 MMHG | DIASTOLIC BLOOD PRESSURE: 76 MMHG

## 2021-08-24 VITALS — WEIGHT: 108.03 LBS | BODY MASS INDEX: 22.68 KG/M2 | HEIGHT: 58 IN

## 2021-08-24 DIAGNOSIS — G35: Primary | ICD-10-CM

## 2021-08-24 PROCEDURE — 96401 CHEMO ANTI-NEOPL SQ/IM: CPT

## 2021-08-27 ENCOUNTER — HOSPITAL ENCOUNTER (OUTPATIENT)
Dept: HOSPITAL 53 - M LAB REF | Age: 76
End: 2021-08-27
Attending: NURSE PRACTITIONER
Payer: MEDICARE

## 2021-08-27 DIAGNOSIS — N39.0: Primary | ICD-10-CM

## 2021-08-31 ENCOUNTER — HOSPITAL ENCOUNTER (OUTPATIENT)
Dept: HOSPITAL 53 - M INFU | Age: 76
Discharge: HOME | End: 2021-08-31
Attending: PHYSICIAN ASSISTANT
Payer: MEDICARE

## 2021-08-31 VITALS — SYSTOLIC BLOOD PRESSURE: 160 MMHG | DIASTOLIC BLOOD PRESSURE: 74 MMHG

## 2021-08-31 VITALS — WEIGHT: 108.03 LBS | HEIGHT: 59 IN | BODY MASS INDEX: 21.78 KG/M2

## 2021-08-31 DIAGNOSIS — G35: Primary | ICD-10-CM

## 2021-08-31 PROCEDURE — 96401 CHEMO ANTI-NEOPL SQ/IM: CPT

## 2021-09-07 ENCOUNTER — HOSPITAL ENCOUNTER (OUTPATIENT)
Dept: HOSPITAL 53 - M INFU | Age: 76
Discharge: HOME | End: 2021-09-07
Attending: PHYSICIAN ASSISTANT
Payer: MEDICARE

## 2021-09-07 VITALS — DIASTOLIC BLOOD PRESSURE: 76 MMHG | SYSTOLIC BLOOD PRESSURE: 139 MMHG

## 2021-09-07 VITALS — HEIGHT: 59 IN | BODY MASS INDEX: 21.78 KG/M2 | WEIGHT: 108.03 LBS

## 2021-09-07 DIAGNOSIS — G35: Primary | ICD-10-CM

## 2021-09-07 PROCEDURE — 96401 CHEMO ANTI-NEOPL SQ/IM: CPT

## 2021-09-14 ENCOUNTER — HOSPITAL ENCOUNTER (OUTPATIENT)
Dept: HOSPITAL 53 - M LAB REF | Age: 76
End: 2021-09-14
Attending: NURSE PRACTITIONER
Payer: MEDICARE

## 2021-09-14 ENCOUNTER — HOSPITAL ENCOUNTER (OUTPATIENT)
Dept: HOSPITAL 53 - M INFU | Age: 76
Discharge: HOME | End: 2021-09-14
Attending: PHYSICIAN ASSISTANT
Payer: MEDICARE

## 2021-09-14 VITALS — WEIGHT: 108.03 LBS | HEIGHT: 58 IN | BODY MASS INDEX: 22.68 KG/M2

## 2021-09-14 VITALS — SYSTOLIC BLOOD PRESSURE: 141 MMHG | DIASTOLIC BLOOD PRESSURE: 76 MMHG

## 2021-09-14 DIAGNOSIS — N39.0: Primary | ICD-10-CM

## 2021-09-14 DIAGNOSIS — G35: Primary | ICD-10-CM

## 2021-09-14 PROCEDURE — 96401 CHEMO ANTI-NEOPL SQ/IM: CPT

## 2021-09-14 PROCEDURE — 87086 URINE CULTURE/COLONY COUNT: CPT

## 2021-09-21 ENCOUNTER — HOSPITAL ENCOUNTER (OUTPATIENT)
Dept: HOSPITAL 53 - M INFU | Age: 76
Discharge: HOME | End: 2021-09-21
Attending: PHYSICIAN ASSISTANT
Payer: MEDICARE

## 2021-09-21 VITALS — DIASTOLIC BLOOD PRESSURE: 63 MMHG | SYSTOLIC BLOOD PRESSURE: 135 MMHG

## 2021-09-21 DIAGNOSIS — G35: Primary | ICD-10-CM

## 2021-09-21 PROCEDURE — 96401 CHEMO ANTI-NEOPL SQ/IM: CPT

## 2021-09-28 ENCOUNTER — HOSPITAL ENCOUNTER (OUTPATIENT)
Dept: HOSPITAL 53 - M INFU | Age: 76
Discharge: HOME | End: 2021-09-28
Attending: PHYSICIAN ASSISTANT
Payer: MEDICARE

## 2021-09-28 VITALS — DIASTOLIC BLOOD PRESSURE: 75 MMHG | SYSTOLIC BLOOD PRESSURE: 146 MMHG

## 2021-09-28 VITALS — BODY MASS INDEX: 21.78 KG/M2 | WEIGHT: 108.03 LBS | HEIGHT: 59 IN

## 2021-09-28 DIAGNOSIS — G35: Primary | ICD-10-CM

## 2021-09-28 PROCEDURE — 96401 CHEMO ANTI-NEOPL SQ/IM: CPT

## 2021-10-05 ENCOUNTER — HOSPITAL ENCOUNTER (OUTPATIENT)
Dept: HOSPITAL 53 - M INFU | Age: 76
Discharge: HOME | End: 2021-10-05
Attending: PHYSICIAN ASSISTANT
Payer: MEDICARE

## 2021-10-05 VITALS — SYSTOLIC BLOOD PRESSURE: 165 MMHG | DIASTOLIC BLOOD PRESSURE: 69 MMHG

## 2021-10-05 VITALS — BODY MASS INDEX: 22.68 KG/M2 | WEIGHT: 108.03 LBS | HEIGHT: 58 IN

## 2021-10-05 DIAGNOSIS — G35: Primary | ICD-10-CM

## 2021-10-05 PROCEDURE — 96401 CHEMO ANTI-NEOPL SQ/IM: CPT

## 2021-10-12 ENCOUNTER — HOSPITAL ENCOUNTER (OUTPATIENT)
Dept: HOSPITAL 53 - M INFU | Age: 76
Discharge: HOME | End: 2021-10-12
Attending: PHYSICIAN ASSISTANT
Payer: MEDICARE

## 2021-10-12 VITALS — HEIGHT: 59 IN | WEIGHT: 108.03 LBS | BODY MASS INDEX: 21.78 KG/M2

## 2021-10-12 VITALS — SYSTOLIC BLOOD PRESSURE: 154 MMHG | DIASTOLIC BLOOD PRESSURE: 75 MMHG

## 2021-10-12 DIAGNOSIS — G35: Primary | ICD-10-CM

## 2021-10-12 PROCEDURE — 96401 CHEMO ANTI-NEOPL SQ/IM: CPT

## 2021-10-19 ENCOUNTER — HOSPITAL ENCOUNTER (OUTPATIENT)
Dept: HOSPITAL 53 - M INFU | Age: 76
Discharge: HOME | End: 2021-10-19
Attending: PHYSICIAN ASSISTANT
Payer: MEDICARE

## 2021-10-19 VITALS — SYSTOLIC BLOOD PRESSURE: 138 MMHG | DIASTOLIC BLOOD PRESSURE: 78 MMHG

## 2021-10-19 VITALS — WEIGHT: 108.03 LBS | HEIGHT: 58 IN | BODY MASS INDEX: 22.68 KG/M2

## 2021-10-19 DIAGNOSIS — G35: Primary | ICD-10-CM

## 2021-10-19 PROCEDURE — 96401 CHEMO ANTI-NEOPL SQ/IM: CPT

## 2021-10-26 ENCOUNTER — HOSPITAL ENCOUNTER (OUTPATIENT)
Dept: HOSPITAL 53 - M INFU | Age: 76
Discharge: HOME | End: 2021-10-26
Attending: PHYSICIAN ASSISTANT
Payer: MEDICARE

## 2021-10-26 VITALS — SYSTOLIC BLOOD PRESSURE: 143 MMHG | DIASTOLIC BLOOD PRESSURE: 73 MMHG

## 2021-10-26 DIAGNOSIS — G35: Primary | ICD-10-CM

## 2021-10-26 PROCEDURE — 96401 CHEMO ANTI-NEOPL SQ/IM: CPT

## 2021-11-02 ENCOUNTER — HOSPITAL ENCOUNTER (OUTPATIENT)
Dept: HOSPITAL 53 - M INFU | Age: 76
Discharge: HOME | End: 2021-11-02
Attending: PHYSICIAN ASSISTANT
Payer: MEDICARE

## 2021-11-02 VITALS — DIASTOLIC BLOOD PRESSURE: 73 MMHG | SYSTOLIC BLOOD PRESSURE: 140 MMHG

## 2021-11-02 VITALS — HEIGHT: 58 IN | WEIGHT: 108.03 LBS | BODY MASS INDEX: 22.68 KG/M2

## 2021-11-02 DIAGNOSIS — G35: Primary | ICD-10-CM

## 2021-11-02 PROCEDURE — 96401 CHEMO ANTI-NEOPL SQ/IM: CPT

## 2021-11-16 ENCOUNTER — HOSPITAL ENCOUNTER (OUTPATIENT)
Dept: HOSPITAL 53 - M INFU | Age: 76
Discharge: HOME | End: 2021-11-16
Attending: PHYSICIAN ASSISTANT
Payer: MEDICARE

## 2021-11-16 VITALS — DIASTOLIC BLOOD PRESSURE: 66 MMHG | SYSTOLIC BLOOD PRESSURE: 148 MMHG

## 2021-11-16 DIAGNOSIS — Z91.048: ICD-10-CM

## 2021-11-16 DIAGNOSIS — G35: Primary | ICD-10-CM

## 2021-11-16 PROCEDURE — 96401 CHEMO ANTI-NEOPL SQ/IM: CPT

## 2021-11-23 ENCOUNTER — HOSPITAL ENCOUNTER (OUTPATIENT)
Dept: HOSPITAL 53 - M INFU | Age: 76
Discharge: HOME | End: 2021-11-23
Attending: PHYSICIAN ASSISTANT
Payer: MEDICARE

## 2021-11-23 VITALS — WEIGHT: 108.03 LBS | BODY MASS INDEX: 22.68 KG/M2 | HEIGHT: 58 IN

## 2021-11-23 VITALS — DIASTOLIC BLOOD PRESSURE: 83 MMHG | SYSTOLIC BLOOD PRESSURE: 192 MMHG

## 2021-11-23 DIAGNOSIS — Z91.048: ICD-10-CM

## 2021-11-23 DIAGNOSIS — G35: Primary | ICD-10-CM

## 2021-11-23 PROCEDURE — 96401 CHEMO ANTI-NEOPL SQ/IM: CPT

## 2021-11-30 ENCOUNTER — HOSPITAL ENCOUNTER (OUTPATIENT)
Dept: HOSPITAL 53 - M INFU | Age: 76
Discharge: HOME | End: 2021-11-30
Attending: PHYSICIAN ASSISTANT
Payer: MEDICARE

## 2021-11-30 VITALS — WEIGHT: 108.03 LBS | BODY MASS INDEX: 22.68 KG/M2 | HEIGHT: 58 IN

## 2021-11-30 VITALS — DIASTOLIC BLOOD PRESSURE: 72 MMHG | SYSTOLIC BLOOD PRESSURE: 145 MMHG

## 2021-11-30 DIAGNOSIS — G35: Primary | ICD-10-CM

## 2021-11-30 PROCEDURE — 96401 CHEMO ANTI-NEOPL SQ/IM: CPT

## 2021-12-07 ENCOUNTER — HOSPITAL ENCOUNTER (OUTPATIENT)
Dept: HOSPITAL 53 - M INFU | Age: 76
Discharge: HOME | End: 2021-12-07
Attending: PHYSICIAN ASSISTANT
Payer: MEDICARE

## 2021-12-07 VITALS — DIASTOLIC BLOOD PRESSURE: 67 MMHG | SYSTOLIC BLOOD PRESSURE: 142 MMHG

## 2021-12-07 DIAGNOSIS — G35: Primary | ICD-10-CM

## 2021-12-07 PROCEDURE — 96401 CHEMO ANTI-NEOPL SQ/IM: CPT

## 2021-12-14 ENCOUNTER — HOSPITAL ENCOUNTER (OUTPATIENT)
Dept: HOSPITAL 53 - M INFU | Age: 76
Discharge: HOME | End: 2021-12-14
Attending: PHYSICIAN ASSISTANT
Payer: MEDICARE

## 2021-12-14 VITALS — DIASTOLIC BLOOD PRESSURE: 77 MMHG | SYSTOLIC BLOOD PRESSURE: 160 MMHG

## 2021-12-14 DIAGNOSIS — G35: Primary | ICD-10-CM

## 2021-12-14 PROCEDURE — 96401 CHEMO ANTI-NEOPL SQ/IM: CPT

## 2021-12-21 ENCOUNTER — HOSPITAL ENCOUNTER (OUTPATIENT)
Dept: HOSPITAL 53 - M INFU | Age: 76
Discharge: HOME | End: 2021-12-21
Attending: PHYSICIAN ASSISTANT
Payer: MEDICARE

## 2021-12-21 VITALS — SYSTOLIC BLOOD PRESSURE: 148 MMHG | DIASTOLIC BLOOD PRESSURE: 67 MMHG

## 2021-12-21 DIAGNOSIS — G35: Primary | ICD-10-CM

## 2021-12-21 PROCEDURE — 96401 CHEMO ANTI-NEOPL SQ/IM: CPT

## 2021-12-28 ENCOUNTER — HOSPITAL ENCOUNTER (OUTPATIENT)
Dept: HOSPITAL 53 - M INFU | Age: 76
Discharge: HOME | End: 2021-12-28
Attending: PHYSICIAN ASSISTANT
Payer: MEDICARE

## 2021-12-28 VITALS — DIASTOLIC BLOOD PRESSURE: 72 MMHG | SYSTOLIC BLOOD PRESSURE: 158 MMHG

## 2021-12-28 VITALS — BODY MASS INDEX: 22.68 KG/M2 | HEIGHT: 58 IN | WEIGHT: 108.03 LBS

## 2021-12-28 DIAGNOSIS — G35: Primary | ICD-10-CM

## 2021-12-28 PROCEDURE — 96401 CHEMO ANTI-NEOPL SQ/IM: CPT

## 2022-01-04 ENCOUNTER — HOSPITAL ENCOUNTER (OUTPATIENT)
Dept: HOSPITAL 53 - M INFU | Age: 77
Discharge: HOME | End: 2022-01-04
Attending: PHYSICIAN ASSISTANT
Payer: MEDICARE

## 2022-01-04 VITALS — DIASTOLIC BLOOD PRESSURE: 56 MMHG | SYSTOLIC BLOOD PRESSURE: 146 MMHG

## 2022-01-04 DIAGNOSIS — G35: Primary | ICD-10-CM

## 2022-01-04 PROCEDURE — 96401 CHEMO ANTI-NEOPL SQ/IM: CPT

## 2022-01-25 ENCOUNTER — HOSPITAL ENCOUNTER (OUTPATIENT)
Dept: HOSPITAL 53 - M INFU | Age: 77
Discharge: HOME | End: 2022-01-25
Attending: PHYSICIAN ASSISTANT
Payer: MEDICARE

## 2022-01-25 VITALS — SYSTOLIC BLOOD PRESSURE: 136 MMHG | DIASTOLIC BLOOD PRESSURE: 84 MMHG

## 2022-01-25 VITALS — WEIGHT: 108.03 LBS | HEIGHT: 58 IN | BODY MASS INDEX: 22.68 KG/M2

## 2022-01-25 DIAGNOSIS — G35: Primary | ICD-10-CM

## 2022-01-25 PROCEDURE — 96401 CHEMO ANTI-NEOPL SQ/IM: CPT

## 2022-02-01 ENCOUNTER — HOSPITAL ENCOUNTER (OUTPATIENT)
Dept: HOSPITAL 53 - M INFU | Age: 77
Discharge: HOME | End: 2022-02-01
Attending: PHYSICIAN ASSISTANT
Payer: MEDICARE

## 2022-02-01 VITALS — BODY MASS INDEX: 22.68 KG/M2 | HEIGHT: 58 IN | WEIGHT: 108.03 LBS

## 2022-02-01 VITALS — SYSTOLIC BLOOD PRESSURE: 123 MMHG | DIASTOLIC BLOOD PRESSURE: 58 MMHG

## 2022-02-01 DIAGNOSIS — G35: Primary | ICD-10-CM

## 2022-02-01 PROCEDURE — 96401 CHEMO ANTI-NEOPL SQ/IM: CPT

## 2022-02-08 ENCOUNTER — HOSPITAL ENCOUNTER (OUTPATIENT)
Dept: HOSPITAL 53 - M INFU | Age: 77
Discharge: HOME | End: 2022-02-08
Attending: PHYSICIAN ASSISTANT
Payer: MEDICARE

## 2022-02-08 VITALS — SYSTOLIC BLOOD PRESSURE: 170 MMHG | DIASTOLIC BLOOD PRESSURE: 75 MMHG

## 2022-02-08 DIAGNOSIS — G35: Primary | ICD-10-CM

## 2022-02-08 PROCEDURE — 96401 CHEMO ANTI-NEOPL SQ/IM: CPT

## 2022-02-15 ENCOUNTER — HOSPITAL ENCOUNTER (OUTPATIENT)
Dept: HOSPITAL 53 - M INFU | Age: 77
Discharge: HOME | End: 2022-02-15
Attending: PHYSICIAN ASSISTANT
Payer: MEDICARE

## 2022-02-15 VITALS — DIASTOLIC BLOOD PRESSURE: 57 MMHG | SYSTOLIC BLOOD PRESSURE: 117 MMHG

## 2022-02-15 VITALS — HEIGHT: 57 IN | WEIGHT: 108.03 LBS | BODY MASS INDEX: 23.31 KG/M2

## 2022-02-15 DIAGNOSIS — G35: Primary | ICD-10-CM

## 2022-02-15 PROCEDURE — 96401 CHEMO ANTI-NEOPL SQ/IM: CPT

## 2022-02-22 ENCOUNTER — HOSPITAL ENCOUNTER (OUTPATIENT)
Dept: HOSPITAL 53 - M INFU | Age: 77
Discharge: HOME | End: 2022-02-22
Attending: PHYSICIAN ASSISTANT
Payer: MEDICARE

## 2022-02-22 VITALS — SYSTOLIC BLOOD PRESSURE: 138 MMHG | DIASTOLIC BLOOD PRESSURE: 61 MMHG

## 2022-02-22 VITALS — BODY MASS INDEX: 21.78 KG/M2 | WEIGHT: 108.03 LBS | HEIGHT: 59 IN

## 2022-02-22 DIAGNOSIS — G35: Primary | ICD-10-CM

## 2022-02-22 PROCEDURE — 96401 CHEMO ANTI-NEOPL SQ/IM: CPT

## 2022-03-01 ENCOUNTER — HOSPITAL ENCOUNTER (OUTPATIENT)
Dept: HOSPITAL 53 - M INFU | Age: 77
Discharge: HOME | End: 2022-03-01
Attending: PHYSICIAN ASSISTANT
Payer: MEDICARE

## 2022-03-01 VITALS — BODY MASS INDEX: 21.78 KG/M2 | WEIGHT: 108.03 LBS | HEIGHT: 59 IN

## 2022-03-01 VITALS — SYSTOLIC BLOOD PRESSURE: 121 MMHG | DIASTOLIC BLOOD PRESSURE: 61 MMHG

## 2022-03-01 DIAGNOSIS — G35: Primary | ICD-10-CM

## 2022-03-01 PROCEDURE — 96372 THER/PROPH/DIAG INJ SC/IM: CPT

## 2022-03-08 ENCOUNTER — HOSPITAL ENCOUNTER (OUTPATIENT)
Dept: HOSPITAL 53 - M INFU | Age: 77
Discharge: HOME | End: 2022-03-08
Attending: PHYSICIAN ASSISTANT
Payer: MEDICARE

## 2022-03-08 VITALS — WEIGHT: 108.03 LBS | BODY MASS INDEX: 21.78 KG/M2 | HEIGHT: 59 IN

## 2022-03-08 VITALS — DIASTOLIC BLOOD PRESSURE: 74 MMHG | SYSTOLIC BLOOD PRESSURE: 162 MMHG

## 2022-03-08 DIAGNOSIS — G35: Primary | ICD-10-CM

## 2022-03-08 PROCEDURE — 96372 THER/PROPH/DIAG INJ SC/IM: CPT

## 2022-03-15 ENCOUNTER — HOSPITAL ENCOUNTER (OUTPATIENT)
Dept: HOSPITAL 53 - M INFU | Age: 77
Discharge: HOME | End: 2022-03-15
Attending: PHYSICIAN ASSISTANT
Payer: MEDICARE

## 2022-03-15 VITALS — WEIGHT: 108.03 LBS | BODY MASS INDEX: 22.68 KG/M2 | HEIGHT: 58 IN

## 2022-03-15 VITALS — SYSTOLIC BLOOD PRESSURE: 143 MMHG | DIASTOLIC BLOOD PRESSURE: 68 MMHG

## 2022-03-15 DIAGNOSIS — G35: Primary | ICD-10-CM

## 2022-03-15 PROCEDURE — 96372 THER/PROPH/DIAG INJ SC/IM: CPT

## 2022-03-22 ENCOUNTER — HOSPITAL ENCOUNTER (OUTPATIENT)
Dept: HOSPITAL 53 - M INFU | Age: 77
Discharge: HOME | End: 2022-03-22
Attending: PHYSICIAN ASSISTANT
Payer: MEDICARE

## 2022-03-22 VITALS — DIASTOLIC BLOOD PRESSURE: 66 MMHG | SYSTOLIC BLOOD PRESSURE: 139 MMHG

## 2022-03-22 DIAGNOSIS — G35: Primary | ICD-10-CM

## 2022-03-22 PROCEDURE — 96372 THER/PROPH/DIAG INJ SC/IM: CPT

## 2024-01-03 ENCOUNTER — HOSPITAL ENCOUNTER (OUTPATIENT)
Dept: HOSPITAL 53 - M LAB REF | Age: 79
End: 2024-01-03
Attending: NURSE PRACTITIONER
Payer: MEDICARE

## 2024-01-03 DIAGNOSIS — N39.0: Primary | ICD-10-CM

## 2024-09-05 ENCOUNTER — HOSPITAL ENCOUNTER (OUTPATIENT)
Dept: HOSPITAL 53 - M LAB REF | Age: 79
End: 2024-09-05
Attending: NURSE PRACTITIONER
Payer: MEDICARE

## 2024-09-05 DIAGNOSIS — N18.31: Primary | ICD-10-CM

## 2024-09-05 LAB
ALBUMIN SERPL BCG-MCNC: 3.4 G/DL (ref 3.2–5.2)
ALP SERPL-CCNC: 81 U/L (ref 46–116)
ALT SERPL W P-5'-P-CCNC: 39 U/L (ref 7–40)
AST SERPL-CCNC: 38 U/L (ref ?–34)
BILIRUB CONJ SERPL-MCNC: 0.2 MG/DL (ref ?–0.4)
BILIRUB SERPL-MCNC: 0.5 MG/DL (ref 0.3–1.2)
PROT SERPL-MCNC: 6.6 G/DL (ref 5.7–8.2)

## 2025-01-14 ENCOUNTER — HOSPITAL ENCOUNTER (OUTPATIENT)
Dept: HOSPITAL 53 - M LAB REF | Age: 80
End: 2025-01-14
Attending: NURSE PRACTITIONER
Payer: MEDICARE

## 2025-01-14 DIAGNOSIS — N39.0: Primary | ICD-10-CM
